# Patient Record
Sex: FEMALE | Race: OTHER | Employment: OTHER | URBAN - NONMETROPOLITAN AREA
[De-identification: names, ages, dates, MRNs, and addresses within clinical notes are randomized per-mention and may not be internally consistent; named-entity substitution may affect disease eponyms.]

---

## 2017-01-25 ENCOUNTER — TELEPHONE (OUTPATIENT)
Dept: CARDIOLOGY | Age: 67
End: 2017-01-25

## 2017-01-25 DIAGNOSIS — Z01.810 PRE-OPERATIVE CARDIOVASCULAR EXAMINATION: ICD-10-CM

## 2017-01-25 DIAGNOSIS — I10 ESSENTIAL HYPERTENSION: Primary | ICD-10-CM

## 2017-01-25 DIAGNOSIS — I20.8 ANGINA AT REST (HCC): ICD-10-CM

## 2017-07-25 ENCOUNTER — HOSPITAL ENCOUNTER (OUTPATIENT)
Dept: INTERVENTIONAL RADIOLOGY/VASCULAR | Age: 67
Discharge: HOME | End: 2017-07-25
Payer: COMMERCIAL

## 2017-07-25 DIAGNOSIS — R09.89 BRUIT: ICD-10-CM

## 2017-07-25 PROCEDURE — 93880 EXTRACRANIAL BILAT STUDY: CPT

## 2017-08-23 ENCOUNTER — OFFICE VISIT (OUTPATIENT)
Dept: CARDIOLOGY CLINIC | Age: 67
End: 2017-08-23
Payer: COMMERCIAL

## 2017-08-23 VITALS
SYSTOLIC BLOOD PRESSURE: 124 MMHG | BODY MASS INDEX: 26.06 KG/M2 | WEIGHT: 138 LBS | HEART RATE: 64 BPM | HEIGHT: 61 IN | DIASTOLIC BLOOD PRESSURE: 72 MMHG

## 2017-08-23 DIAGNOSIS — I10 ESSENTIAL HYPERTENSION: Primary | ICD-10-CM

## 2017-08-23 DIAGNOSIS — R07.2 PRECORDIAL PAIN: ICD-10-CM

## 2017-08-23 PROCEDURE — 99213 OFFICE O/P EST LOW 20 MIN: CPT | Performed by: INTERNAL MEDICINE

## 2018-03-31 ENCOUNTER — HOSPITAL ENCOUNTER (EMERGENCY)
Age: 68
Discharge: HOME | End: 2018-03-31
Payer: COMMERCIAL

## 2018-03-31 VITALS
WEIGHT: 136 LBS | BODY MASS INDEX: 25.7 KG/M2 | HEART RATE: 81 BPM | TEMPERATURE: 98.2 F | RESPIRATION RATE: 16 BRPM | OXYGEN SATURATION: 99 % | DIASTOLIC BLOOD PRESSURE: 88 MMHG | SYSTOLIC BLOOD PRESSURE: 151 MMHG

## 2018-03-31 DIAGNOSIS — J02.0 STREP PHARYNGITIS: Primary | ICD-10-CM

## 2018-03-31 LAB
GROUP A STREP CULTURE, REFLEX: POSITIVE
REFLEX THROAT C + S: NORMAL

## 2018-03-31 PROCEDURE — 99213 OFFICE O/P EST LOW 20 MIN: CPT

## 2018-03-31 PROCEDURE — 99213 OFFICE O/P EST LOW 20 MIN: CPT | Performed by: NURSE PRACTITIONER

## 2018-03-31 RX ORDER — AMOXICILLIN 500 MG/1
500 CAPSULE ORAL 3 TIMES DAILY
Qty: 30 CAPSULE | Refills: 0 | Status: SHIPPED | OUTPATIENT
Start: 2018-03-31 | End: 2018-04-10

## 2018-03-31 ASSESSMENT — ENCOUNTER SYMPTOMS
COUGH: 0
NAUSEA: 0
SORE THROAT: 1
EYE ITCHING: 0
ABDOMINAL PAIN: 0
EYE DISCHARGE: 0
PHOTOPHOBIA: 0
DIARRHEA: 0
SHORTNESS OF BREATH: 0
EYE REDNESS: 0
VOMITING: 0
EYE PAIN: 0
SINUS CONGESTION: 1
CONSTIPATION: 0
SINUS PRESSURE: 1
WHEEZING: 0
RHINORRHEA: 1

## 2018-03-31 ASSESSMENT — PAIN SCALES - GENERAL: PAINLEVEL_OUTOF10: 3

## 2018-03-31 ASSESSMENT — PAIN DESCRIPTION - DESCRIPTORS: DESCRIPTORS: ACHING

## 2018-03-31 ASSESSMENT — PAIN DESCRIPTION - LOCATION: LOCATION: THROAT

## 2018-03-31 ASSESSMENT — PAIN DESCRIPTION - PAIN TYPE: TYPE: ACUTE PAIN

## 2018-03-31 ASSESSMENT — PAIN DESCRIPTION - FREQUENCY: FREQUENCY: CONTINUOUS

## 2018-07-25 ENCOUNTER — HOSPITAL ENCOUNTER (OUTPATIENT)
Age: 68
Discharge: HOME | End: 2018-07-25
Payer: COMMERCIAL

## 2018-07-25 PROCEDURE — 83036 HEMOGLOBIN GLYCOSYLATED A1C: CPT

## 2018-07-25 PROCEDURE — 36415 COLL VENOUS BLD VENIPUNCTURE: CPT

## 2018-07-26 LAB
AVERAGE GLUCOSE: 147 MG/DL (ref 70–126)
HBA1C MFR BLD: 6.9 % (ref 4.4–6.4)

## 2018-08-15 ENCOUNTER — OFFICE VISIT (OUTPATIENT)
Dept: CARDIOLOGY CLINIC | Age: 68
End: 2018-08-15
Payer: COMMERCIAL

## 2018-08-15 VITALS
WEIGHT: 133.69 LBS | BODY MASS INDEX: 26.95 KG/M2 | DIASTOLIC BLOOD PRESSURE: 68 MMHG | SYSTOLIC BLOOD PRESSURE: 144 MMHG | HEIGHT: 59 IN

## 2018-08-15 DIAGNOSIS — E11.9 TYPE 2 DIABETES MELLITUS WITHOUT COMPLICATION, WITHOUT LONG-TERM CURRENT USE OF INSULIN (HCC): ICD-10-CM

## 2018-08-15 DIAGNOSIS — I10 ESSENTIAL HYPERTENSION: Primary | ICD-10-CM

## 2018-08-15 DIAGNOSIS — Z82.49 FAMILY HISTORY OF ASCVD: ICD-10-CM

## 2018-08-15 PROCEDURE — G8598 ASA/ANTIPLAT THER USED: HCPCS | Performed by: PHYSICIAN ASSISTANT

## 2018-08-15 PROCEDURE — 2022F DILAT RTA XM EVC RTNOPTHY: CPT | Performed by: PHYSICIAN ASSISTANT

## 2018-08-15 PROCEDURE — 99213 OFFICE O/P EST LOW 20 MIN: CPT | Performed by: PHYSICIAN ASSISTANT

## 2018-08-15 PROCEDURE — 93000 ELECTROCARDIOGRAM COMPLETE: CPT | Performed by: PHYSICIAN ASSISTANT

## 2018-08-15 PROCEDURE — 1101F PT FALLS ASSESS-DOCD LE1/YR: CPT | Performed by: PHYSICIAN ASSISTANT

## 2018-08-15 PROCEDURE — G8427 DOCREV CUR MEDS BY ELIG CLIN: HCPCS | Performed by: PHYSICIAN ASSISTANT

## 2018-08-15 PROCEDURE — 1123F ACP DISCUSS/DSCN MKR DOCD: CPT | Performed by: PHYSICIAN ASSISTANT

## 2018-08-15 PROCEDURE — 3017F COLORECTAL CA SCREEN DOC REV: CPT | Performed by: PHYSICIAN ASSISTANT

## 2018-08-15 PROCEDURE — 1090F PRES/ABSN URINE INCON ASSESS: CPT | Performed by: PHYSICIAN ASSISTANT

## 2018-08-15 PROCEDURE — G8400 PT W/DXA NO RESULTS DOC: HCPCS | Performed by: PHYSICIAN ASSISTANT

## 2018-08-15 PROCEDURE — 3044F HG A1C LEVEL LT 7.0%: CPT | Performed by: PHYSICIAN ASSISTANT

## 2018-08-15 PROCEDURE — 4040F PNEUMOC VAC/ADMIN/RCVD: CPT | Performed by: PHYSICIAN ASSISTANT

## 2018-08-15 PROCEDURE — 1036F TOBACCO NON-USER: CPT | Performed by: PHYSICIAN ASSISTANT

## 2018-08-15 PROCEDURE — G8419 CALC BMI OUT NRM PARAM NOF/U: HCPCS | Performed by: PHYSICIAN ASSISTANT

## 2018-08-15 RX ORDER — ASPIRIN 81 MG/1
81 TABLET ORAL DAILY
COMMUNITY
End: 2019-08-14 | Stop reason: ALTCHOICE

## 2018-08-15 RX ORDER — CITALOPRAM 20 MG/1
20 TABLET ORAL DAILY
COMMUNITY

## 2018-08-22 ENCOUNTER — HOSPITAL ENCOUNTER (OUTPATIENT)
Dept: WOMENS IMAGING | Age: 68
Discharge: HOME | End: 2018-08-22
Payer: COMMERCIAL

## 2018-08-22 DIAGNOSIS — Z12.31 VISIT FOR SCREENING MAMMOGRAM: ICD-10-CM

## 2018-08-22 PROCEDURE — 77063 BREAST TOMOSYNTHESIS BI: CPT

## 2019-03-05 ENCOUNTER — HOSPITAL ENCOUNTER (OUTPATIENT)
Age: 69
Discharge: HOME | End: 2019-03-05
Payer: COMMERCIAL

## 2019-03-05 ENCOUNTER — HOSPITAL ENCOUNTER (OUTPATIENT)
Dept: GENERAL RADIOLOGY | Age: 69
Discharge: HOME | End: 2019-03-05
Payer: COMMERCIAL

## 2019-03-05 DIAGNOSIS — M25.40 SWOLLEN JOINT: ICD-10-CM

## 2019-03-05 PROCEDURE — 73130 X-RAY EXAM OF HAND: CPT

## 2019-03-22 ENCOUNTER — HOSPITAL ENCOUNTER (OUTPATIENT)
Age: 69
Discharge: HOME | End: 2019-03-22
Payer: COMMERCIAL

## 2019-03-22 LAB
ALBUMIN SERPL-MCNC: 4 G/DL (ref 3.5–5.1)
ALP BLD-CCNC: 74 U/L (ref 38–126)
ALT SERPL-CCNC: 11 U/L (ref 11–66)
ANION GAP SERPL CALCULATED.3IONS-SCNC: 12 MEQ/L (ref 8–16)
AST SERPL-CCNC: 17 U/L (ref 5–40)
BILIRUB SERPL-MCNC: 0.2 MG/DL (ref 0.3–1.2)
BUN BLDV-MCNC: 20 MG/DL (ref 7–22)
CALCIUM SERPL-MCNC: 9.7 MG/DL (ref 8.5–10.5)
CHLORIDE BLD-SCNC: 101 MEQ/L (ref 98–111)
CHOLESTEROL, FASTING: 131 MG/DL (ref 100–199)
CO2: 26 MEQ/L (ref 23–33)
CREAT SERPL-MCNC: 0.7 MG/DL (ref 0.4–1.2)
GFR SERPL CREATININE-BSD FRML MDRD: 84 ML/MIN/1.73M2
GLUCOSE FASTING: 116 MG/DL (ref 70–108)
HDLC SERPL-MCNC: 47 MG/DL
LDL CHOLESTEROL CALCULATED: 62 MG/DL
POTASSIUM SERPL-SCNC: 4.9 MEQ/L (ref 3.5–5.2)
RHEUMATOID FACTOR: 12 IU/ML (ref 0–13)
SEDIMENTATION RATE, ERYTHROCYTE: 10 MM/HR (ref 0–20)
SODIUM BLD-SCNC: 139 MEQ/L (ref 135–145)
TOTAL PROTEIN: 7.1 G/DL (ref 6.1–8)
TRIGLYCERIDE, FASTING: 108 MG/DL (ref 0–199)

## 2019-03-22 PROCEDURE — 85651 RBC SED RATE NONAUTOMATED: CPT

## 2019-03-22 PROCEDURE — 36415 COLL VENOUS BLD VENIPUNCTURE: CPT

## 2019-03-22 PROCEDURE — 80053 COMPREHEN METABOLIC PANEL: CPT

## 2019-03-22 PROCEDURE — 86431 RHEUMATOID FACTOR QUANT: CPT

## 2019-03-22 PROCEDURE — 80061 LIPID PANEL: CPT

## 2019-08-14 ENCOUNTER — OFFICE VISIT (OUTPATIENT)
Dept: CARDIOLOGY CLINIC | Age: 69
End: 2019-08-14
Payer: COMMERCIAL

## 2019-08-14 VITALS
BODY MASS INDEX: 26.85 KG/M2 | DIASTOLIC BLOOD PRESSURE: 58 MMHG | SYSTOLIC BLOOD PRESSURE: 102 MMHG | HEIGHT: 59 IN | WEIGHT: 133.2 LBS | HEART RATE: 85 BPM

## 2019-08-14 DIAGNOSIS — Z82.49 FAMILY HISTORY OF ASCVD: Primary | ICD-10-CM

## 2019-08-14 PROBLEM — M75.51 BURSITIS OF RIGHT SHOULDER: Status: ACTIVE | Noted: 2019-08-14

## 2019-08-14 PROBLEM — G56.01 CARPAL TUNNEL SYNDROME OF RIGHT WRIST: Status: ACTIVE | Noted: 2019-08-14

## 2019-08-14 PROBLEM — R20.0 LOSS OF FEELING OR SENSATION: Status: ACTIVE | Noted: 2019-08-14

## 2019-08-14 PROBLEM — M19.019 LOCALIZED, PRIMARY OSTEOARTHRITIS OF SHOULDER REGION: Status: ACTIVE | Noted: 2019-08-14

## 2019-08-14 PROBLEM — M75.122 COMPLETE TEAR OF LEFT ROTATOR CUFF: Status: ACTIVE | Noted: 2019-08-14

## 2019-08-14 PROBLEM — M75.40 IMPINGEMENT SYNDROME OF SHOULDER: Status: ACTIVE | Noted: 2019-08-14

## 2019-08-14 PROCEDURE — 3017F COLORECTAL CA SCREEN DOC REV: CPT | Performed by: INTERNAL MEDICINE

## 2019-08-14 PROCEDURE — 1123F ACP DISCUSS/DSCN MKR DOCD: CPT | Performed by: INTERNAL MEDICINE

## 2019-08-14 PROCEDURE — 99213 OFFICE O/P EST LOW 20 MIN: CPT | Performed by: INTERNAL MEDICINE

## 2019-08-14 PROCEDURE — G8419 CALC BMI OUT NRM PARAM NOF/U: HCPCS | Performed by: INTERNAL MEDICINE

## 2019-08-14 PROCEDURE — 1036F TOBACCO NON-USER: CPT | Performed by: INTERNAL MEDICINE

## 2019-08-14 PROCEDURE — G8598 ASA/ANTIPLAT THER USED: HCPCS | Performed by: INTERNAL MEDICINE

## 2019-08-14 PROCEDURE — 4040F PNEUMOC VAC/ADMIN/RCVD: CPT | Performed by: INTERNAL MEDICINE

## 2019-08-14 PROCEDURE — 1090F PRES/ABSN URINE INCON ASSESS: CPT | Performed by: INTERNAL MEDICINE

## 2019-08-14 PROCEDURE — G8427 DOCREV CUR MEDS BY ELIG CLIN: HCPCS | Performed by: INTERNAL MEDICINE

## 2019-08-14 PROCEDURE — 93000 ELECTROCARDIOGRAM COMPLETE: CPT | Performed by: INTERNAL MEDICINE

## 2019-08-14 PROCEDURE — G8400 PT W/DXA NO RESULTS DOC: HCPCS | Performed by: INTERNAL MEDICINE

## 2019-08-14 RX ORDER — ATORVASTATIN CALCIUM 20 MG/1
20 TABLET, FILM COATED ORAL DAILY
Refills: 11 | COMMUNITY

## 2019-08-14 RX ORDER — DULAGLUTIDE 1.5 MG/.5ML
1.5 INJECTION, SOLUTION SUBCUTANEOUS WEEKLY
Refills: 6 | COMMUNITY

## 2019-08-14 NOTE — PROGRESS NOTES
COLONOSCOPY      ENDOSCOPY, COLON, DIAGNOSTIC      FOOT SURGERY Bilateral 2004    HYSTERECTOMY      JOINT REPLACEMENT Right 2009 2018    knee    LITHOTRIPSY      MECKEL DIVERTICULUM EXCISION      diverticulosis    SHOULDER SURGERY Left 1992       Subjective:     REVIEW OF SYSTEMS  Constitutional: denies sweats, chills and fever  HENT: denies  congestion, sinus pressure, sneezing and sore throat. Eyes: denies  pain, discharge, redness and itching. Respiratory: denies apnea, cough  Gastrointestinal: denies blood in stool, constipation, diarrhea   Endocrine: denies cold intolerance, heat intolerance, polydipsia. Genitourinary: denies dysuria, enuresis, flank pain and hematuria. Musculoskeletal: denies arthralgias, joint swelling and neck pain. Neurological: denies numbness and headaches. Psychiatric/Behavioral: denies agitation, confusion, decreased concentration and dysphoric mood    All others reviewed and are negative. Objective:     BP (!) 102/58   Pulse 85   Ht 4' 11\" (1.499 m)   Wt 133 lb 3.2 oz (60.4 kg)   BMI 26.90 kg/m²     Wt Readings from Last 3 Encounters:   08/14/19 133 lb 3.2 oz (60.4 kg)   08/15/18 133 lb 11 oz (60.6 kg)   03/31/18 136 lb (61.7 kg)     BP Readings from Last 3 Encounters:   08/14/19 (!) 102/58   08/15/18 (!) 144/68   03/31/18 (!) 151/88       PHYSICAL EXAM  Constitutional: Oriented to person, place, and time. Appears well-developed and well-nourished. HENT:   Head: Normocephalic and atraumatic. Eyes: EOM are normal. Pupils are equal, round, and reactive to light. Neck: Normal range of motion. Neck supple. No JVD present. Cardiovascular: Normal rate , normal heart sounds and intact distal pulses. Pulmonary/Chest: Effort normal and breath sounds normal. No respiratory distress. No wheezes. No rales. Abdominal: Soft. Bowel sounds are normal. No distension. There is no tenderness. Musculoskeletal: Normal range of motion. No edema.    Neurological: Alert

## 2019-09-03 ENCOUNTER — NURSE ONLY (OUTPATIENT)
Dept: LAB | Age: 69
End: 2019-09-03

## 2019-09-03 LAB
ALBUMIN SERPL-MCNC: 4.7 G/DL (ref 3.5–5.1)
ALP BLD-CCNC: 74 U/L (ref 38–126)
ALT SERPL-CCNC: 13 U/L (ref 11–66)
ANION GAP SERPL CALCULATED.3IONS-SCNC: 12 MEQ/L (ref 8–16)
AST SERPL-CCNC: 19 U/L (ref 5–40)
BACTERIA: ABNORMAL
BASOPHILS # BLD: 0.4 %
BASOPHILS ABSOLUTE: 0 THOU/MM3 (ref 0–0.1)
BILIRUB SERPL-MCNC: 0.4 MG/DL (ref 0.3–1.2)
BILIRUBIN URINE: NEGATIVE
BLOOD, URINE: NEGATIVE
BUN BLDV-MCNC: 14 MG/DL (ref 7–22)
CALCIUM SERPL-MCNC: 10.5 MG/DL (ref 8.5–10.5)
CASTS: ABNORMAL /LPF
CASTS: ABNORMAL /LPF
CHARACTER, URINE: CLEAR
CHLORIDE BLD-SCNC: 101 MEQ/L (ref 98–111)
CHOLESTEROL, TOTAL: 144 MG/DL (ref 100–199)
CO2: 27 MEQ/L (ref 23–33)
COLOR: YELLOW
CREAT SERPL-MCNC: 0.6 MG/DL (ref 0.4–1.2)
CRYSTALS: ABNORMAL
EOSINOPHIL # BLD: 1.3 %
EOSINOPHILS ABSOLUTE: 0.1 THOU/MM3 (ref 0–0.4)
EPITHELIAL CELLS, UA: ABNORMAL /HPF
ERYTHROCYTE [DISTWIDTH] IN BLOOD BY AUTOMATED COUNT: 12.3 % (ref 11.5–14.5)
ERYTHROCYTE [DISTWIDTH] IN BLOOD BY AUTOMATED COUNT: 44.7 FL (ref 35–45)
GFR SERPL CREATININE-BSD FRML MDRD: > 90 ML/MIN/1.73M2
GLUCOSE BLD-MCNC: 103 MG/DL (ref 70–108)
GLUCOSE, URINE: NEGATIVE MG/DL
HCT VFR BLD CALC: 40.1 % (ref 37–47)
HDLC SERPL-MCNC: 55 MG/DL
HEMOGLOBIN: 12.8 GM/DL (ref 12–16)
IMMATURE GRANS (ABS): 0.01 THOU/MM3 (ref 0–0.07)
IMMATURE GRANULOCYTES: 0 %
KETONES, URINE: NEGATIVE
LDL CHOLESTEROL CALCULATED: 60 MG/DL
LEUKOCYTE EST, POC: ABNORMAL
LYMPHOCYTES # BLD: 31.6 %
LYMPHOCYTES ABSOLUTE: 1.7 THOU/MM3 (ref 1–4.8)
MCH RBC QN AUTO: 31.3 PG (ref 26–33)
MCHC RBC AUTO-ENTMCNC: 31.9 GM/DL (ref 32.2–35.5)
MCV RBC AUTO: 98 FL (ref 81–99)
MISCELLANEOUS LAB TEST RESULT: ABNORMAL
MONOCYTES # BLD: 5.7 %
MONOCYTES ABSOLUTE: 0.3 THOU/MM3 (ref 0.4–1.3)
NITRITE, URINE: NEGATIVE
NUCLEATED RED BLOOD CELLS: 0 /100 WBC
PH UA: 7 (ref 5–9)
PLATELET # BLD: 282 THOU/MM3 (ref 130–400)
PMV BLD AUTO: 10.5 FL (ref 9.4–12.4)
POTASSIUM SERPL-SCNC: 4.7 MEQ/L (ref 3.5–5.2)
PROTEIN UA: NEGATIVE MG/DL
RBC # BLD: 4.09 MILL/MM3 (ref 4.2–5.4)
RBC URINE: ABNORMAL /HPF
RENAL EPITHELIAL, UA: ABNORMAL
SEG NEUTROPHILS: 60.8 %
SEGMENTED NEUTROPHILS ABSOLUTE COUNT: 3.3 THOU/MM3 (ref 1.8–7.7)
SODIUM BLD-SCNC: 140 MEQ/L (ref 135–145)
SPECIFIC GRAVITY UA: 1.02 (ref 1–1.03)
T4 FREE: 1.24 NG/DL (ref 0.93–1.76)
TOTAL PROTEIN: 8 G/DL (ref 6.1–8)
TRIGL SERPL-MCNC: 143 MG/DL (ref 0–199)
TSH SERPL DL<=0.05 MIU/L-ACNC: 3.99 UIU/ML (ref 0.4–4.2)
UROBILINOGEN, URINE: 0.2 EU/DL (ref 0–1)
WBC # BLD: 5.5 THOU/MM3 (ref 4.8–10.8)
WBC UA: ABNORMAL /HPF
YEAST: ABNORMAL

## 2020-03-02 ENCOUNTER — APPOINTMENT (OUTPATIENT)
Dept: CT IMAGING | Age: 70
End: 2020-03-02
Payer: MEDICARE

## 2020-03-02 ENCOUNTER — HOSPITAL ENCOUNTER (EMERGENCY)
Age: 70
Discharge: HOME | End: 2020-03-02
Attending: FAMILY MEDICINE
Payer: MEDICARE

## 2020-03-02 ENCOUNTER — APPOINTMENT (OUTPATIENT)
Dept: GENERAL RADIOLOGY | Age: 70
End: 2020-03-02
Payer: MEDICARE

## 2020-03-02 VITALS
WEIGHT: 130 LBS | RESPIRATION RATE: 17 BRPM | TEMPERATURE: 97.7 F | SYSTOLIC BLOOD PRESSURE: 107 MMHG | BODY MASS INDEX: 26.21 KG/M2 | OXYGEN SATURATION: 100 % | HEART RATE: 77 BPM | HEIGHT: 59 IN | DIASTOLIC BLOOD PRESSURE: 48 MMHG

## 2020-03-02 LAB
ANION GAP SERPL CALCULATED.3IONS-SCNC: 11 MEQ/L (ref 8–16)
BASOPHILS # BLD: 0.2 %
BASOPHILS ABSOLUTE: 0 THOU/MM3 (ref 0–0.1)
BUN BLDV-MCNC: 20 MG/DL (ref 7–22)
CALCIUM SERPL-MCNC: 9.4 MG/DL (ref 8.5–10.5)
CHLORIDE BLD-SCNC: 100 MEQ/L (ref 98–111)
CO2: 23 MEQ/L (ref 23–33)
CREAT SERPL-MCNC: 0.7 MG/DL (ref 0.4–1.2)
EKG ATRIAL RATE: 87 BPM
EKG P AXIS: 57 DEGREES
EKG P-R INTERVAL: 150 MS
EKG Q-T INTERVAL: 360 MS
EKG QRS DURATION: 78 MS
EKG QTC CALCULATION (BAZETT): 433 MS
EKG R AXIS: -25 DEGREES
EKG T AXIS: 30 DEGREES
EKG VENTRICULAR RATE: 87 BPM
EOSINOPHIL # BLD: 2.1 %
EOSINOPHILS ABSOLUTE: 0.2 THOU/MM3 (ref 0–0.4)
ERYTHROCYTE [DISTWIDTH] IN BLOOD BY AUTOMATED COUNT: 12.9 % (ref 11.5–14.5)
ERYTHROCYTE [DISTWIDTH] IN BLOOD BY AUTOMATED COUNT: 46.5 FL (ref 35–45)
GFR SERPL CREATININE-BSD FRML MDRD: 83 ML/MIN/1.73M2
GLUCOSE BLD-MCNC: 97 MG/DL (ref 70–108)
HCT VFR BLD CALC: 38.4 % (ref 37–47)
HEMOGLOBIN: 11.9 GM/DL (ref 12–16)
IMMATURE GRANS (ABS): 0.01 THOU/MM3 (ref 0–0.07)
IMMATURE GRANULOCYTES: 0.1 %
LYMPHOCYTES # BLD: 24.6 %
LYMPHOCYTES ABSOLUTE: 2 THOU/MM3 (ref 1–4.8)
MCH RBC QN AUTO: 30.7 PG (ref 26–33)
MCHC RBC AUTO-ENTMCNC: 31 GM/DL (ref 32.2–35.5)
MCV RBC AUTO: 99.2 FL (ref 81–99)
MONOCYTES # BLD: 4.3 %
MONOCYTES ABSOLUTE: 0.3 THOU/MM3 (ref 0.4–1.3)
NUCLEATED RED BLOOD CELLS: 0 /100 WBC
OSMOLALITY CALCULATION: 270.8 MOSMOL/KG (ref 275–300)
PLATELET # BLD: 256 THOU/MM3 (ref 130–400)
PLATELET ESTIMATE: ADEQUATE
PMV BLD AUTO: 10.5 FL (ref 9.4–12.4)
POTASSIUM REFLEX MAGNESIUM: 4.7 MEQ/L (ref 3.5–5.2)
RBC # BLD: 3.87 MILL/MM3 (ref 4.2–5.4)
SCAN OF BLOOD SMEAR: NORMAL
SEG NEUTROPHILS: 68.7 %
SEGMENTED NEUTROPHILS ABSOLUTE COUNT: 5.6 THOU/MM3 (ref 1.8–7.7)
SODIUM BLD-SCNC: 134 MEQ/L (ref 135–145)
WBC # BLD: 8.1 THOU/MM3 (ref 4.8–10.8)

## 2020-03-02 PROCEDURE — 93005 ELECTROCARDIOGRAM TRACING: CPT | Performed by: FAMILY MEDICINE

## 2020-03-02 PROCEDURE — 72125 CT NECK SPINE W/O DYE: CPT

## 2020-03-02 PROCEDURE — 6370000000 HC RX 637 (ALT 250 FOR IP): Performed by: FAMILY MEDICINE

## 2020-03-02 PROCEDURE — 70450 CT HEAD/BRAIN W/O DYE: CPT

## 2020-03-02 PROCEDURE — 36415 COLL VENOUS BLD VENIPUNCTURE: CPT

## 2020-03-02 PROCEDURE — 99284 EMERGENCY DEPT VISIT MOD MDM: CPT

## 2020-03-02 PROCEDURE — 80048 BASIC METABOLIC PNL TOTAL CA: CPT

## 2020-03-02 PROCEDURE — 73564 X-RAY EXAM KNEE 4 OR MORE: CPT

## 2020-03-02 PROCEDURE — 93010 ELECTROCARDIOGRAM REPORT: CPT | Performed by: NUCLEAR MEDICINE

## 2020-03-02 PROCEDURE — 85025 COMPLETE CBC W/AUTO DIFF WBC: CPT

## 2020-03-02 RX ORDER — ONDANSETRON 4 MG/1
4 TABLET, ORALLY DISINTEGRATING ORAL ONCE
Status: COMPLETED | OUTPATIENT
Start: 2020-03-02 | End: 2020-03-02

## 2020-03-02 RX ADMIN — ONDANSETRON 4 MG: 4 TABLET, ORALLY DISINTEGRATING ORAL at 10:37

## 2020-03-02 ASSESSMENT — PAIN DESCRIPTION - LOCATION: LOCATION: NECK;HEAD

## 2020-03-02 ASSESSMENT — PAIN DESCRIPTION - PAIN TYPE: TYPE: ACUTE PAIN

## 2020-03-02 ASSESSMENT — PAIN DESCRIPTION - ORIENTATION: ORIENTATION: OTHER (COMMENT)

## 2020-03-02 ASSESSMENT — PAIN SCALES - GENERAL: PAINLEVEL_OUTOF10: 3

## 2020-03-02 ASSESSMENT — PAIN DESCRIPTION - DESCRIPTORS: DESCRIPTORS: ACHING;HEADACHE

## 2020-03-02 NOTE — ED TRIAGE NOTES
Pt presents to rm 21 c/o lightheadedness, head and neck pain and vomiting that started yesterday after pt slipped and fell on the bottom step of her staircase. Pt states that she doesn't remember hitting her head but passed out yesterday and has been vomiting and these symptoms since then. Pt updated on POC. VSS. EKG completed and pt placed on cardiac monitor. Call light in reach. Will monitor.

## 2020-03-02 NOTE — ED PROVIDER NOTES
[address] [facility] COMPLAINT   Chief Complaint   Patient presents with    Headache    Loss of Consciousness     yesterday    Emesis        HPI   Shubham Smith is a 81 y.o. female well controlled diabetic on trulicity and metformin who presents after syncope yesterday after a misstep from walking down to her basement to do laundry, but she tripped over a step and landed on her left knee. She was able to get up but in doing so passed out, and found herself inside her laundry basket. She woke up with headache and neck pain this morning. She is a dialysis nurse. Moving her neck hurt her right side of neck. She denies any vision loss or unilateral strength deficits. REVIEW OF SYSTEMS   Cardiac: +syncope; No Chest Pain, denies palpitations  Neurologic: + Headache; denies vision change or extremity weakness  Neck: +neck pain  Respiratory: No SOB  GI: No abdominal pain or vomiting   General: Denies Fever  All other review of systems otherwise negative.      PAST MEDICAL & SURGICAL HISTORY   Past Medical History:   Diagnosis Date    Anxiety     Blood transfusion     Diabetes mellitus ([facility] [address]     Diverticulosis of colon     Hyperlipidemia     Hypertension     Localized, primary osteoarthritis of shoulder region 8/14/2019    Nausea & vomiting      Past Surgical History:   Procedure Laterality Date    CARPAL TUNNEL RELEASE Right     CHOLECYSTECTOMY      COLONOSCOPY      ENDOSCOPY, COLON, DIAGNOSTIC      FOOT SURGERY Bilateral 2004    HYSTERECTOMY      JOINT REPLACEMENT Right 2009 2018    knee    LITHOTRIPSY      MECKEL DIVERTICULUM EXCISION      diverticulosis    SHOULDER SURGERY Left 1992      CURRENT MEDICATIONS   Current Outpatient Rx   Medication Sig Dispense Refill    atorvastatin (LIPITOR) 20 MG tablet TAKE 1 TABLET BY MOUTH ONCE DAILY FOR 30 DAYS  11    TRULICITY 2.12 EP/2.0CU SOPN INJECT AS DIRECTED ONCE WEEKLY SUBCUTANEOUSLY FOR 30 DAYS  6    citalopram (CELEXA) 40 acute distress   HENT: Atraumatic, moist mucus membranes, pupils equally round and reactive to light, extraocular movements intact; no scalp tenderness or contusion noted to head  Neck: supple, no JVD   Respiratory: Lungs Clear, no retractions   Cardiovascular: Reg rate, normal rhythm, no murmurs  Vascular: Radial pulses 2+ equal bilaterally  GI: Soft, nontender, normal bowel sounds  Musculoskeletal: No edema, no deformities  Integument: Skin warm and dry, no petechiae   Neurologic: Alert & oriented, moving all extremities without issues, no pronator drift. No facial droop, speech is clear  Psych: Pleasant affect, no hallucinations     EKG (interpreted by me) Interpretation 0929 on 3/2/2020  Rhythm: normal sinus   Rate: 87 BPM  Axis: normal  Ectopy: none  Conduction: normal  ST/T Segments: no acute change  Clinical Impression: nonspecific    RADIOLOGY/PROCEDURES   CT Head WO Contrast   Final Result   No acute process            **This report has been created using voice recognition software. It may contain minor errors which are inherent in voice recognition technology. **      Final report electronically signed by Dr. Kush Wright on 3/2/2020 10:24 AM      CT Cervical Spine WO Contrast   Final Result   Straightening of the normal cervical lordosis which could be related to muscle spasm and/or positioning. No acute fracture. **This report has been created using voice recognition software. It may contain minor errors which are inherent in voice recognition technology. **      Final report electronically signed by Dr. Kush Wright on 3/2/2020 10:27 AM      XR KNEE LEFT (MIN 4 VIEWS)   Final Result   Soft tissue contusion no acute osseous abnormality            **This report has been created using voice recognition software. It may contain minor errors which are inherent in voice recognition technology. **      Final report electronically signed by Dr. Kush Wright on 3/2/2020 10:28 AM          ED COURSE &

## 2020-03-02 NOTE — ED NOTES
Lab at bedside. Pt medicated per order. VSS. Denies other needs at this time. Call light in reach. Will monitor.       Ronak Regalado RN  03/02/20 0533

## 2020-05-07 ENCOUNTER — HOSPITAL ENCOUNTER (OUTPATIENT)
Age: 70
Discharge: HOME | End: 2020-05-07
Payer: MEDICARE

## 2020-05-07 LAB
ALBUMIN SERPL-MCNC: 4.5 G/DL (ref 3.5–5.1)
ALP BLD-CCNC: 68 U/L (ref 38–126)
ALT SERPL-CCNC: 16 U/L (ref 11–66)
ANION GAP SERPL CALCULATED.3IONS-SCNC: 11 MEQ/L (ref 8–16)
AST SERPL-CCNC: 22 U/L (ref 5–40)
BASOPHILS # BLD: 0.2 %
BASOPHILS ABSOLUTE: 0 THOU/MM3 (ref 0–0.1)
BILIRUB SERPL-MCNC: 0.4 MG/DL (ref 0.3–1.2)
BUN BLDV-MCNC: 15 MG/DL (ref 7–22)
C-REACTIVE PROTEIN: 0.1 MG/DL (ref 0–1)
CALCIUM SERPL-MCNC: 9.9 MG/DL (ref 8.5–10.5)
CHLORIDE BLD-SCNC: 99 MEQ/L (ref 98–111)
CO2: 24 MEQ/L (ref 23–33)
CREAT SERPL-MCNC: 0.7 MG/DL (ref 0.4–1.2)
EOSINOPHIL # BLD: 1.1 %
EOSINOPHILS ABSOLUTE: 0.1 THOU/MM3 (ref 0–0.4)
ERYTHROCYTE [DISTWIDTH] IN BLOOD BY AUTOMATED COUNT: 13.4 % (ref 11.5–14.5)
ERYTHROCYTE [DISTWIDTH] IN BLOOD BY AUTOMATED COUNT: 49.1 FL (ref 35–45)
GFR SERPL CREATININE-BSD FRML MDRD: 83 ML/MIN/1.73M2
GLUCOSE BLD-MCNC: 97 MG/DL (ref 70–108)
HCT VFR BLD CALC: 38.8 % (ref 37–47)
HEMOGLOBIN: 12 GM/DL (ref 12–16)
IMMATURE GRANS (ABS): 0.01 THOU/MM3 (ref 0–0.07)
IMMATURE GRANULOCYTES: 0.2 %
LYMPHOCYTES # BLD: 29.4 %
LYMPHOCYTES ABSOLUTE: 1.6 THOU/MM3 (ref 1–4.8)
MCH RBC QN AUTO: 30.5 PG (ref 26–33)
MCHC RBC AUTO-ENTMCNC: 30.9 GM/DL (ref 32.2–35.5)
MCV RBC AUTO: 98.7 FL (ref 81–99)
MONOCYTES # BLD: 5.6 %
MONOCYTES ABSOLUTE: 0.3 THOU/MM3 (ref 0.4–1.3)
NUCLEATED RED BLOOD CELLS: 0 /100 WBC
PLATELET # BLD: 273 THOU/MM3 (ref 130–400)
PMV BLD AUTO: 10.8 FL (ref 9.4–12.4)
POTASSIUM SERPL-SCNC: 4.8 MEQ/L (ref 3.5–5.2)
RBC # BLD: 3.93 MILL/MM3 (ref 4.2–5.4)
RHEUMATOID FACTOR: 11 IU/ML (ref 0–13)
SEDIMENTATION RATE, ERYTHROCYTE: 14 MM/HR (ref 0–20)
SEG NEUTROPHILS: 63.5 %
SEGMENTED NEUTROPHILS ABSOLUTE COUNT: 3.5 THOU/MM3 (ref 1.8–7.7)
SODIUM BLD-SCNC: 134 MEQ/L (ref 135–145)
TOTAL PROTEIN: 7.2 G/DL (ref 6.1–8)
URIC ACID: 5.6 MG/DL (ref 2.4–5.7)
WBC # BLD: 5.5 THOU/MM3 (ref 4.8–10.8)

## 2020-05-07 PROCEDURE — 86038 ANTINUCLEAR ANTIBODIES: CPT

## 2020-05-07 PROCEDURE — 86430 RHEUMATOID FACTOR TEST QUAL: CPT

## 2020-05-07 PROCEDURE — 85651 RBC SED RATE NONAUTOMATED: CPT

## 2020-05-07 PROCEDURE — 80053 COMPREHEN METABOLIC PANEL: CPT

## 2020-05-07 PROCEDURE — 85025 COMPLETE CBC W/AUTO DIFF WBC: CPT

## 2020-05-07 PROCEDURE — 86039 ANTINUCLEAR ANTIBODIES (ANA): CPT

## 2020-05-07 PROCEDURE — 86140 C-REACTIVE PROTEIN: CPT

## 2020-05-07 PROCEDURE — 36415 COLL VENOUS BLD VENIPUNCTURE: CPT

## 2020-05-07 PROCEDURE — 84550 ASSAY OF BLOOD/URIC ACID: CPT

## 2020-05-07 PROCEDURE — 86200 CCP ANTIBODY: CPT

## 2020-05-10 LAB — ANA SCREEN: DETECTED

## 2020-05-11 LAB
ANA PATTERN: ABNORMAL
ANA TITER: ABNORMAL
ANTINUCLEAR AB INTERPRETIVE COMMENT: ABNORMAL
ANTINUCLEAR ANTIBODY, HEP-2, IGG: DETECTED
CYCLIC CITRULLINATED PEPTIDE ANTIBODY IGG: < 1.5 U/ML

## 2020-06-12 ENCOUNTER — HOSPITAL ENCOUNTER (OUTPATIENT)
Dept: ULTRASOUND IMAGING | Age: 70
Discharge: HOME | End: 2020-06-12
Payer: MEDICARE

## 2020-06-12 PROCEDURE — 76705 ECHO EXAM OF ABDOMEN: CPT

## 2020-08-10 ENCOUNTER — HOSPITAL ENCOUNTER (OUTPATIENT)
Age: 70
End: 2020-08-10
Payer: MEDICARE

## 2020-08-26 ENCOUNTER — OFFICE VISIT (OUTPATIENT)
Dept: CARDIOLOGY CLINIC | Age: 70
End: 2020-08-26
Payer: MEDICARE

## 2020-08-26 VITALS
HEART RATE: 80 BPM | SYSTOLIC BLOOD PRESSURE: 122 MMHG | BODY MASS INDEX: 26 KG/M2 | DIASTOLIC BLOOD PRESSURE: 64 MMHG | HEIGHT: 59 IN | WEIGHT: 129 LBS

## 2020-08-26 PROCEDURE — 99213 OFFICE O/P EST LOW 20 MIN: CPT | Performed by: INTERNAL MEDICINE

## 2020-08-26 RX ORDER — HYDROXYCHLOROQUINE SULFATE 200 MG/1
300 TABLET, FILM COATED ORAL DAILY
COMMUNITY
Start: 2020-08-06

## 2020-08-26 RX ORDER — TOLTERODINE TARTRATE 1 MG/1
1 TABLET, EXTENDED RELEASE ORAL 2 TIMES DAILY
COMMUNITY

## 2020-08-26 RX ORDER — DICYCLOMINE HYDROCHLORIDE 10 MG/1
CAPSULE ORAL
COMMUNITY
Start: 2020-07-07 | End: 2021-12-06

## 2020-08-26 NOTE — PROGRESS NOTES
[address] [place] [address] [address] [facility] [address]  Dept: [phone]  Dept Fax: [phone]  Loc: [phone]    Visit Date: 8/26/2020    Ms. Giancarlo Hernandez is a 81 y.o. female  who presented for:  Chief Complaint   Patient presents with    Check-Up    Hypertension       HPI:   82 yo F c hx of DM, HTN, is here for afollow up. Denies any chest pain, sob, palpitations, lightheadedness, dizziness, orthopnea, PND or pedal edema. Current Outpatient Medications:     hydroxychloroquine (PLAQUENIL) 200 MG tablet, 200 mg , Disp: , Rfl:     tolterodine (DETROL) 1 MG tablet, TAKE 1 TABLET BY MOUTH TWICE DAILY FOR 30 DAYS, Disp: , Rfl:     Probiotic Product (PROBIOTIC DAILY PO), Take by mouth 2 times daily, Disp: , Rfl:     dicyclomine (BENTYL) 10 MG capsule, TAKE 1 CAPSULE BY MOUTH 4 TIMES DAILY AS NEEDED FOR CRAMPING PAIN, Disp: , Rfl:     atorvastatin (LIPITOR) 20 MG tablet, TAKE 1 TABLET BY MOUTH ONCE DAILY FOR 30 DAYS, Disp: , Rfl: 11    TRULICITY 7.80 LC/3.3KG SOPN, INJECT AS DIRECTED ONCE WEEKLY SUBCUTANEOUSLY FOR 30 DAYS, Disp: , Rfl: 6    citalopram (CELEXA) 40 MG tablet, Take 20 mg by mouth daily , Disp: , Rfl:     Multiple Vitamins-Minerals (THERAPEUTIC MULTIVITAMIN-MINERALS) tablet, Take 1 tablet by mouth daily, Disp: , Rfl:     lisinopril (PRINIVIL;ZESTRIL) 5 MG tablet, Take 5 mg by mouth daily. , Disp: , Rfl:     metformin (GLUCOPHAGE) 1000 MG tablet, Take 1,000 mg by mouth 2 times daily (with meals). , Disp: , Rfl:     Past Medical History  Abdullahi Oviedo  has a past medical history of Anxiety, Blood transfusion, Diabetes mellitus ([address] Diverticulosis of colon, Hyperlipidemia, Hypertension, Localized, primary osteoarthritis of shoulder region, and Nausea & vomiting. Social History  Abdullahi Oviedo  reports that she has never smoked. She has never used smokeless tobacco. She reports current alcohol use.  She reports that she does not use drugs. Family History  Ayala Hernandez family history includes Arthritis in her father and mother; Cancer in her father; Heart Disease in her father; Stroke in her sister. Past Surgical History   Past Surgical History:   Procedure Laterality Date    CARPAL TUNNEL RELEASE Right     CHOLECYSTECTOMY      COLONOSCOPY      ENDOSCOPY, COLON, DIAGNOSTIC      FOOT SURGERY Bilateral 2004    HYSTERECTOMY      JOINT REPLACEMENT Right 2009 2018    knee    LITHOTRIPSY      MECKEL DIVERTICULUM EXCISION      diverticulosis    SHOULDER SURGERY Left 1992       Subjective:     REVIEW OF SYSTEMS  Constitutional: denies sweats, chills and fever  HENT: denies  congestion, sinus pressure, sneezing and sore throat. Eyes: denies  pain, discharge, redness and itching. Respiratory: denies apnea, cough  Gastrointestinal: denies blood in stool, constipation, diarrhea   Endocrine: denies cold intolerance, heat intolerance, polydipsia. Genitourinary: denies dysuria, enuresis, flank pain and hematuria. Musculoskeletal: denies arthralgias, joint swelling and neck pain. Neurological: denies numbness and headaches. Psychiatric/Behavioral: denies agitation, confusion, decreased concentration and dysphoric mood    All others reviewed and are negative. Objective:     /64   Pulse 80   Ht 4' 11\" (1.499 m)   Wt 129 lb (58.5 kg)   BMI 26.05 kg/m²     Wt Readings from Last 3 Encounters:   08/26/20 129 lb (58.5 kg)   03/02/20 130 lb (59 kg)   08/14/19 133 lb 3.2 oz (60.4 kg)     BP Readings from Last 3 Encounters:   08/26/20 122/64   03/02/20 (!) 107/48   08/14/19 (!) 102/58       PHYSICAL EXAM  Constitutional: Oriented to person, place, and time. Appears well-developed and well-nourished. HENT:   Head: Normocephalic and atraumatic. Eyes: EOM are normal. Pupils are equal, round, and reactive to light. Neck: Normal range of motion. Neck supple. No JVD present.    Cardiovascular: Normal rate , normal heart sounds and intact distal pulses. Pulmonary/Chest: Effort normal and breath sounds normal. No respiratory distress. No wheezes. No rales. Abdominal: Soft. Bowel sounds are normal. No distension. There is no tenderness. Musculoskeletal: Normal range of motion. No edema. Neurological: Alert and oriented to person, place, and time. No cranial nerve deficit. Coordination normal.   Skin: Skin is warm and dry. Psychiatric: Normal mood and affect. No results found for: CKTOTAL, CKMB, CKMBINDEX    Lab Results   Component Value Date    WBC 4.2 08/11/2020    RBC 3.92 08/11/2020    HGB 12.0 08/11/2020    HCT 37.8 08/11/2020    MCV 96.4 08/11/2020    MCH 30.6 08/11/2020    MCHC 31.7 08/11/2020    RDW 14.4 01/24/2017     08/11/2020    MPV 10.7 08/11/2020       Lab Results   Component Value Date     05/07/2020    K 4.8 05/07/2020    K 4.7 03/02/2020    CL 99 05/07/2020    CO2 24 05/07/2020    BUN 15 05/07/2020    LABALBU 4.5 05/07/2020    CREATININE 0.6 08/11/2020    CALCIUM 9.9 05/07/2020    LABGLOM >90 08/11/2020    GLUCOSE 97 05/07/2020       Lab Results   Component Value Date    ALKPHOS 68 05/07/2020    ALT 13 08/11/2020    AST 22 05/07/2020    PROT 7.2 05/07/2020    BILITOT 0.4 05/07/2020    LABALBU 4.5 05/07/2020       No results found for: MG    Lab Results   Component Value Date    PROTIME 11.5 (L) 08/11/2020         Lab Results   Component Value Date    LABA1C 6.9 07/25/2018       Lab Results   Component Value Date    TRIG 143 09/03/2019    HDL 55 09/03/2019    LDLCALC 60 09/03/2019       Lab Results   Component Value Date    TSH 3.990 09/03/2019         Testing Reviewed:      I haveindividually reviewed the below cardiac tests    EKG:    ECHO: No results found for this or any previous visit. STRESS:    CATH:    Assessment/Plan       Diagnosis Orders   1. ASCVD (arteriosclerotic cardiovascular disease)     2.  Angina at rest [facility])       ASCVD  DM  HTN    Denies any cardiac symptoms  BP well controlled  Continue statin, lisinopril  LDL is 62   Continue rest of the management  The patient is asked to make an attempt to improve diet and exercise patterns to aid in medical management of this problem. Advised more plant based nutrition/meditarrean diet   Advised patient to call office or seek immediate medical attention if there is any new onset of  any chest pain, sob, palpitations, lightheadedness, dizziness, orthopnea, PND or pedal edema. All medication side effects were discussed in details. Thank youfor allowing me to participate in the care of this patient. Please do not hesitate to contact me for any further questions. Return in about 1 year (around 8/26/2021) for Regular follow up, Review testing.        Electronically signed by Louie Sparks MD [facility]  8/26/2020 at 2:31 PM

## 2020-08-26 NOTE — PROGRESS NOTES
Pt here for 3 yr check up     Pt denies chest pain, sob, swelling in legs and feet, heart palpitations     Pt continues with lightheaded at times,

## 2020-09-10 ENCOUNTER — APPOINTMENT (OUTPATIENT)
Dept: GENERAL RADIOLOGY | Age: 70
End: 2020-09-10
Payer: MEDICARE

## 2020-09-10 ENCOUNTER — HOSPITAL ENCOUNTER (EMERGENCY)
Age: 70
Discharge: HOME | End: 2020-09-10
Attending: EMERGENCY MEDICINE
Payer: MEDICARE

## 2020-09-10 VITALS
BODY MASS INDEX: 26.21 KG/M2 | HEIGHT: 59 IN | DIASTOLIC BLOOD PRESSURE: 61 MMHG | TEMPERATURE: 96.8 F | WEIGHT: 130 LBS | RESPIRATION RATE: 16 BRPM | OXYGEN SATURATION: 98 % | HEART RATE: 94 BPM | SYSTOLIC BLOOD PRESSURE: 134 MMHG

## 2020-09-10 PROCEDURE — 99282 EMERGENCY DEPT VISIT SF MDM: CPT

## 2020-09-10 PROCEDURE — 99283 EMERGENCY DEPT VISIT LOW MDM: CPT

## 2020-09-10 PROCEDURE — 70360 X-RAY EXAM OF NECK: CPT

## 2020-09-10 RX ORDER — HYDROCODONE BITARTRATE AND ACETAMINOPHEN 5; 325 MG/1; MG/1
1 TABLET ORAL EVERY 6 HOURS PRN
Qty: 15 TABLET | Refills: 0 | Status: SHIPPED | OUTPATIENT
Start: 2020-09-10 | End: 2020-09-15

## 2020-09-10 RX ORDER — CYCLOBENZAPRINE HCL 10 MG
10 TABLET ORAL 3 TIMES DAILY PRN
Qty: 21 TABLET | Refills: 0 | Status: SHIPPED | OUTPATIENT
Start: 2020-09-10 | End: 2020-09-17

## 2020-09-10 ASSESSMENT — PAIN DESCRIPTION - PAIN TYPE: TYPE: ACUTE PAIN

## 2020-09-10 ASSESSMENT — PAIN SCALES - GENERAL: PAINLEVEL_OUTOF10: 8

## 2020-09-10 ASSESSMENT — PAIN DESCRIPTION - LOCATION: LOCATION: NECK

## 2020-09-10 ASSESSMENT — PAIN DESCRIPTION - ORIENTATION: ORIENTATION: RIGHT

## 2020-09-10 ASSESSMENT — PAIN DESCRIPTION - DESCRIPTORS: DESCRIPTORS: THROBBING

## 2020-09-10 NOTE — ED PROVIDER NOTES
[address] [facility]  [place] [address]  Phone: [address]    Chief Complaint   Patient presents with    Neck Pain       HPI    Lio Garg is a 81 y.o. female who presents above-noted complaint. Patient's been doing pretty well. She had her mother had a recent fall. She had to do some some slight assisting and she thinks she strained her neck in that area. She complains of pain discomfort of the right anterior neck area and right trapezius area. She denies weakness numbness or tingling hurts to twist her head neck and even to swallow. PAST MEDICAL HISTORY    Past Medical History:   Diagnosis Date    Anxiety     Blood transfusion     Diabetes mellitus ([facility] [address]     Diverticulosis of colon     Hyperlipidemia     Hypertension     Localized, primary osteoarthritis of shoulder region 8/14/2019    Nausea & vomiting        SURGICAL HISTORY    Past Surgical History:   Procedure Laterality Date    CARPAL TUNNEL RELEASE Right     CHOLECYSTECTOMY      COLONOSCOPY      ENDOSCOPY, COLON, DIAGNOSTIC      FOOT SURGERY Bilateral 2004    HYSTERECTOMY      JOINT REPLACEMENT Right 2009 2018    knee    LITHOTRIPSY      MECKEL DIVERTICULUM EXCISION      diverticulosis    SHOULDER SURGERY Left 1992       CURRENT MEDICATIONS    Current Outpatient Rx   Medication Sig Dispense Refill    cyclobenzaprine (FLEXERIL) 10 MG tablet Take 1 tablet by mouth 3 times daily as needed for Muscle spasms 21 tablet 0    HYDROcodone-acetaminophen (NORCO) 5-325 MG per tablet Take 1 tablet by mouth every 6 hours as needed for Pain for up to 5 days.  15 tablet 0    hydroxychloroquine (PLAQUENIL) 200 MG tablet 200 mg       tolterodine (DETROL) 1 MG tablet TAKE 1 TABLET BY MOUTH TWICE DAILY FOR 30 DAYS      Probiotic Product (PROBIOTIC DAILY PO) Take by mouth 2 times daily      dicyclomine (BENTYL) 10 MG capsule TAKE 1 CAPSULE BY MOUTH 4 TIMES DAILY AS NEEDED FOR CRAMPING PAIN      atorvastatin (LIPITOR) 20 MG tablet TAKE 1 TABLET BY MOUTH ONCE DAILY FOR 30 DAYS  11    TRULICITY 6.58 YV/2.2CN SOPN INJECT AS DIRECTED ONCE WEEKLY SUBCUTANEOUSLY FOR 30 DAYS  6    citalopram (CELEXA) 40 MG tablet Take 20 mg by mouth daily       Multiple Vitamins-Minerals (THERAPEUTIC MULTIVITAMIN-MINERALS) tablet Take 1 tablet by mouth daily      lisinopril (PRINIVIL;ZESTRIL) 5 MG tablet Take 5 mg by mouth daily.  metformin (GLUCOPHAGE) 1000 MG tablet Take 1,000 mg by mouth 2 times daily (with meals).          ALLERGIES    No Known Allergies    FAMILY HISTORY    Family History   Problem Relation Age of Onset    Cancer Father     Heart Disease Father     Arthritis Father     Arthritis Mother     Stroke Sister        SOCIAL HISTORY    Social History     Socioeconomic History    Marital status:      Spouse name: None    Number of children: None    Years of education: None    Highest education level: None   Occupational History    None   Social Needs    Financial resource strain: None    Food insecurity     Worry: None     Inability: None    Transportation needs     Medical: None     Non-medical: None   Tobacco Use    Smoking status: Never Smoker    Smokeless tobacco: Never Used   Substance and Sexual Activity    Alcohol use: Yes     Comment: rarely    Drug use: No    Sexual activity: None   Lifestyle    Physical activity     Days per week: None     Minutes per session: None    Stress: None   Relationships    Social connections     Talks on phone: None     Gets together: None     Attends Synagogue service: None     Active member of club or organization: None     Attends meetings of clubs or organizations: None     Relationship status: None    Intimate partner violence     Fear of current or ex partner: None     Emotionally abused: None     Physically abused: None     Forced sexual activity: None   Other Topics Concern    None Social History Narrative    None       REVIEW OF SYSTEMS    Neck pain without nausea vomiting or chest pain. No bowel bladder habit changes. All systems negative except as marked. PHYSICAL EXAM    VITAL SIGNS: /61   Pulse 94   Temp 96.8 °F (36 °C) (Temporal)   Resp 16   Ht 4' 11\" (1.499 m)   Wt 130 lb (59 kg)   SpO2 98%   BMI 26.26 kg/m²    Constitutional:  Alert not toxic or ill, pleasant alert  HENT: COVID mask protection in place normocephalic, Atraumatic, Nose normal.  Cervical Spine: Slight decreased range of motion although pain with twisting flexion anteriorly. There is no carotid bruits. Slight pain over the right deltoid. No clavicular pain. No stridor. No tenderness, Supple,  Eyes:  No discharge or  Swelling,Conjunctiva normal,EOMI,  Respiratory: No respiratory distress, Normal breath sounds,  No wheezing, No chest tenderness. Musculoskeletal:  Intact distal pulses, No edema, No tenderness, No cyanosis, No clubbing. Good range of motion in all major joints. No tenderness to palpation or major deformities noted. Back:No tenderness. Integument:  Warm, Dry, No erythema, No rash (on exposed areas)   Lymphatic:  No lymphadenopathy noted. Neurologic:  Alert & oriented x 3, Normal motor function, Normal sensory function, No focal deficits noted. Psychiatric:  Affect normal, Judgment normal, Mood normal.                   RADIOLOGY    XR NECK SOFT TISSUE   Final Result   1. Degenerative changes of the cervical spine, most pronounced at the C5-C6 level. No other acute findings. If there is continued clinical concern, further evaluation could be obtained with CT or MRI. **This report has been created using voice recognition software. It may contain minor errors which are inherent in voice recognition technology. **      Final report electronically signed by Dr. Liat Lopez on 9/10/2020 8:21 AM          PROCEDURES    none      CONSULTS:  None      CRITICAL Take 1 tablet by mouth 3 times daily as needed for Muscle spasms    HYDROCODONE-ACETAMINOPHEN (NORCO) 5-325 MG PER TABLET    Take 1 tablet by mouth every 6 hours as needed for Pain for up to 5 days.            Tory Heredia MD  09/14/20 [id number]

## 2020-09-10 NOTE — ED NOTES
AVS rev'd with pt. And copy given with Rx. X.2. Pulse regular. Extremities warm. Respirations regular and quiet. Mucous membranes pink & moist. Alert and oriented times 3. No nausea or vomiting. Range of motion within patient's limits. Skin pink, warm and dry. Calm and cooperative.      Leigh Collins RN  09/10/20 1975

## 2020-09-10 NOTE — ED NOTES
Pt. Presents ambulatory to ED with c/o rt. Sided neck pain, increases with movement and swallowing. Pt. Denies any injury other than assisting in picking her mother up off the floor after she fell a few days ago.       Christine Cardona RN  09/10/20 0337

## 2020-09-16 ENCOUNTER — HOSPITAL ENCOUNTER (OUTPATIENT)
Age: 70
Discharge: HOME | End: 2020-09-16
Payer: MEDICARE

## 2020-09-16 ENCOUNTER — HOSPITAL ENCOUNTER (OUTPATIENT)
Dept: GENERAL RADIOLOGY | Age: 70
Discharge: HOME | End: 2020-09-16
Payer: MEDICARE

## 2020-09-16 PROCEDURE — 72072 X-RAY EXAM THORAC SPINE 3VWS: CPT

## 2020-09-16 PROCEDURE — 72040 X-RAY EXAM NECK SPINE 2-3 VW: CPT

## 2020-09-17 ENCOUNTER — HOSPITAL ENCOUNTER (OUTPATIENT)
Dept: WOMENS IMAGING | Age: 70
Discharge: HOME | End: 2020-09-17
Payer: MEDICARE

## 2020-09-17 PROCEDURE — 77063 BREAST TOMOSYNTHESIS BI: CPT

## 2020-09-17 PROCEDURE — 77080 DXA BONE DENSITY AXIAL: CPT

## 2020-09-28 ENCOUNTER — NURSE ONLY (OUTPATIENT)
Dept: LAB | Age: 70
End: 2020-09-28

## 2020-09-29 LAB
T4 FREE: 1.79 NG/DL (ref 0.93–1.76)
TSH SERPL DL<=0.05 MIU/L-ACNC: 6.7 UIU/ML (ref 0.4–4.2)

## 2020-10-07 ENCOUNTER — HOSPITAL ENCOUNTER (OUTPATIENT)
Dept: ULTRASOUND IMAGING | Age: 70
Discharge: HOME | End: 2020-10-07
Payer: MEDICARE

## 2020-10-07 PROCEDURE — 76536 US EXAM OF HEAD AND NECK: CPT

## 2020-11-09 ENCOUNTER — HOSPITAL ENCOUNTER (OUTPATIENT)
Age: 70
Discharge: HOME | End: 2020-11-09
Payer: MEDICARE

## 2020-11-09 LAB
BASOPHILS # BLD: 0.2 %
BASOPHILS ABSOLUTE: 0 THOU/MM3 (ref 0–0.1)
EOSINOPHIL # BLD: 1 %
EOSINOPHILS ABSOLUTE: 0.1 THOU/MM3 (ref 0–0.4)
ERYTHROCYTE [DISTWIDTH] IN BLOOD BY AUTOMATED COUNT: 13.3 % (ref 11.5–14.5)
ERYTHROCYTE [DISTWIDTH] IN BLOOD BY AUTOMATED COUNT: 48 FL (ref 35–45)
HCT VFR BLD CALC: 38.3 % (ref 37–47)
HEMOGLOBIN: 12.1 GM/DL (ref 12–16)
IMMATURE GRANS (ABS): 0.01 THOU/MM3 (ref 0–0.07)
IMMATURE GRANULOCYTES: 0.2 %
LYMPHOCYTES # BLD: 23.8 %
LYMPHOCYTES ABSOLUTE: 1.5 THOU/MM3 (ref 1–4.8)
MCH RBC QN AUTO: 31.1 PG (ref 26–33)
MCHC RBC AUTO-ENTMCNC: 31.6 GM/DL (ref 32.2–35.5)
MCV RBC AUTO: 98.5 FL (ref 81–99)
MONOCYTES # BLD: 4.6 %
MONOCYTES ABSOLUTE: 0.3 THOU/MM3 (ref 0.4–1.3)
NUCLEATED RED BLOOD CELLS: 0 /100 WBC
PLATELET # BLD: 251 THOU/MM3 (ref 130–400)
PMV BLD AUTO: 11.3 FL (ref 9.4–12.4)
RBC # BLD: 3.89 MILL/MM3 (ref 4.2–5.4)
SEDIMENTATION RATE, ERYTHROCYTE: 16 MM/HR (ref 0–20)
SEG NEUTROPHILS: 70.2 %
SEGMENTED NEUTROPHILS ABSOLUTE COUNT: 4.3 THOU/MM3 (ref 1.8–7.7)
WBC # BLD: 6.1 THOU/MM3 (ref 4.8–10.8)

## 2020-11-09 PROCEDURE — 85025 COMPLETE CBC W/AUTO DIFF WBC: CPT

## 2020-11-09 PROCEDURE — 84443 ASSAY THYROID STIM HORMONE: CPT

## 2020-11-09 PROCEDURE — 36415 COLL VENOUS BLD VENIPUNCTURE: CPT

## 2020-11-09 PROCEDURE — 84439 ASSAY OF FREE THYROXINE: CPT

## 2020-11-09 PROCEDURE — 82565 ASSAY OF CREATININE: CPT

## 2020-11-09 PROCEDURE — 84460 ALANINE AMINO (ALT) (SGPT): CPT

## 2020-11-09 PROCEDURE — 85651 RBC SED RATE NONAUTOMATED: CPT

## 2020-11-10 LAB
ALT SERPL-CCNC: 16 U/L (ref 11–66)
CREAT SERPL-MCNC: 0.7 MG/DL (ref 0.4–1.2)
GFR SERPL CREATININE-BSD FRML MDRD: 83 ML/MIN/1.73M2
T4 FREE: 1.29 NG/DL (ref 0.93–1.76)
TSH SERPL DL<=0.05 MIU/L-ACNC: 2.98 UIU/ML (ref 0.4–4.2)

## 2020-11-25 ENCOUNTER — HOSPITAL ENCOUNTER (OUTPATIENT)
Age: 70
Discharge: HOME | End: 2020-11-25
Payer: MEDICARE

## 2020-11-25 ENCOUNTER — HOSPITAL ENCOUNTER (OUTPATIENT)
Dept: GENERAL RADIOLOGY | Age: 70
Discharge: HOME | End: 2020-11-25
Payer: MEDICARE

## 2020-11-25 PROCEDURE — 73000 X-RAY EXAM OF COLLAR BONE: CPT

## 2021-02-22 ENCOUNTER — NURSE ONLY (OUTPATIENT)
Dept: LAB | Age: 71
End: 2021-02-22

## 2021-02-23 LAB
ALT SERPL-CCNC: 15 U/L (ref 11–66)
BASOPHILS # BLD: 0.2 %
BASOPHILS ABSOLUTE: 0 THOU/MM3 (ref 0–0.1)
CREAT SERPL-MCNC: 0.8 MG/DL (ref 0.4–1.2)
EOSINOPHIL # BLD: 1.3 %
EOSINOPHILS ABSOLUTE: 0.1 THOU/MM3 (ref 0–0.4)
ERYTHROCYTE [DISTWIDTH] IN BLOOD BY AUTOMATED COUNT: 13.2 % (ref 11.5–14.5)
ERYTHROCYTE [DISTWIDTH] IN BLOOD BY AUTOMATED COUNT: 46.1 FL (ref 35–45)
GFR SERPL CREATININE-BSD FRML MDRD: 71 ML/MIN/1.73M2
HCT VFR BLD CALC: 38.2 % (ref 37–47)
HEMOGLOBIN: 12 GM/DL (ref 12–16)
IMMATURE GRANS (ABS): 0.02 THOU/MM3 (ref 0–0.07)
IMMATURE GRANULOCYTES: 0.4 %
LYMPHOCYTES # BLD: 40.3 %
LYMPHOCYTES ABSOLUTE: 1.8 THOU/MM3 (ref 1–4.8)
MCH RBC QN AUTO: 30.1 PG (ref 26–33)
MCHC RBC AUTO-ENTMCNC: 31.4 GM/DL (ref 32.2–35.5)
MCV RBC AUTO: 95.7 FL (ref 81–99)
MONOCYTES # BLD: 6.4 %
MONOCYTES ABSOLUTE: 0.3 THOU/MM3 (ref 0.4–1.3)
NUCLEATED RED BLOOD CELLS: 0 /100 WBC
PLATELET # BLD: 267 THOU/MM3 (ref 130–400)
PMV BLD AUTO: 11 FL (ref 9.4–12.4)
RBC # BLD: 3.99 MILL/MM3 (ref 4.2–5.4)
SEDIMENTATION RATE, ERYTHROCYTE: 15 MM/HR (ref 0–20)
SEG NEUTROPHILS: 51.4 %
SEGMENTED NEUTROPHILS ABSOLUTE COUNT: 2.3 THOU/MM3 (ref 1.8–7.7)
WBC # BLD: 4.5 THOU/MM3 (ref 4.8–10.8)

## 2021-03-23 ENCOUNTER — TELEPHONE (OUTPATIENT)
Dept: PHARMACY | Facility: CLINIC | Age: 71
End: 2021-03-23

## 2021-03-23 RX ORDER — MELOXICAM 7.5 MG/1
7.5 TABLET ORAL DAILY
COMMUNITY

## 2021-03-23 RX ORDER — LEVOTHYROXINE SODIUM 0.03 MG/1
25 TABLET ORAL DAILY
COMMUNITY

## 2021-03-23 RX ORDER — METFORMIN HYDROCHLORIDE 500 MG/1
1000 TABLET, EXTENDED RELEASE ORAL 2 TIMES DAILY
COMMUNITY

## 2021-03-23 NOTE — TELEPHONE ENCOUNTER
[facility] CLINICAL PHARMACY: STATIN THERAPY REVIEW  Identified statin use in persons with cardiovascular disease care gap per [employer]. Records dated 2/12/21. Last Office Visit: unknown - PCP appears to be an affiliate     Patient also appears to be taking: metformin (LF 2/11/21 90-ds) and lisinopril (LF 3/1/21 90-ds)    Patient found in Outcomes [place] and is currently eligible for CMR    ASSESSMENT:  Patient has been identified as having a diagnosis for clinical ASCVD or event (e.g., inpatient hospitalization for MI, CABG, PCI or other revascularization procedures) in the measurement year and not currently filling a moderate or high intensity statin. Patients included in this care gap are males age 18-72 and females age 43-69. Per chart review, patient is prescribed atorvastatin 20 mg daily. Per Outcomes Records:  Atorvastatin last filled on 12/12/20 for a 90 day supply. Per [facility]:   Atorvastatin last picked up on 12/12/20. There are refills remaining. Lab Results   Component Value Date    CHOL 130 09/08/2020    TRIG 110 09/08/2020    HDL 50 09/08/2020    LDLCALC 58 09/08/2020     ALT   Date Value Ref Range Status   02/22/2021 15 11 - 66 U/L Final     Comment:     Performed at [facility], [address]     AST   Date Value Ref Range Status   09/08/2020 18 5 - 40 U/L Final       The 10-year ASCVD risk score (Sofiya Goldman et al., 2013) is: 18%    Values used to calculate the score:      Age: 78 years      Sex: Female      Is Non- : No      Diabetic: Yes      Tobacco smoker: No      Systolic Blood Pressure: 596 mmHg      Is BP treated: Yes      HDL Cholesterol: 50 mg/dL      Total Cholesterol: 130 mg/dL     Hyperlipidemia Goal: Patient is prescribed moderate-intensity statin therapy. PLAN:  Attempting to reach patient to review.  Left message asking for return call.  Will attempt to contact the patient again to discuss atorvastatin adherence and complete CMR.      Hildegarde Habermann, PharmD, [facility]  Direct: [phone]  Department, toll free [phone], option 7

## 2021-03-23 NOTE — LETTER
[place]  [address], [place] [address] [facility] [address] [phone]           03/25/21     Dear Fabrizio Curran,    We tried to reach you recently regarding your atorvastatin, but were unable to reach you on the telephone. We have on file that you are currently taking atorvastatin 20 mg once daily. If you are no longer taking or taking differently, please call us at the number below so that we can discuss this and update your medication profile. It appears that this medication has not been filled at proper times. We are worried you might be missing doses or not taking it as directed. It is important that you take your medications regularly and try not to miss a single dose. Additionally, it appears you are eligible for a yearly medication review by a clinical pharmacist. This is a free service provided and can be completed over the phone. If you are interested in this service, please contact us at the number below to schedule your appointment!      Sincerely,   Loan Craft, PharmD, [place]  Direct: [phone]  Department, toll free [phone], option 7

## 2021-03-25 NOTE — TELEPHONE ENCOUNTER
Second attempt to contact the patient in regards to atorvastatin therapy and to complete CMR. Left message for patient to return call. 10 42 [facility] staff will process 90-ds of atorvastatin. Billed through Ian Anjel. Will prepare and send a letter to the patient today and will sign off at this time.      Yesi Kramer, PharmD, [facility]  Direct: [phone]  Department, toll free [phone], option 7          For Pharmacy Admin Tracking Only    PHSO: Yes  Total # of Interventions Recommended: 0  - New Order #: 0 New Medication Order Reason(s): Needs Additional Medication Therapy  - Maintenance Safety Lab Monitoring #: 1  Recommended intervention potential cost savings: 0  Total Interventions Accepted: 0  Time Spent (min): 15

## 2021-07-21 ENCOUNTER — HOSPITAL ENCOUNTER (EMERGENCY)
Age: 71
Discharge: HOME | End: 2021-07-21
Attending: FAMILY MEDICINE
Payer: MEDICARE

## 2021-07-21 VITALS
OXYGEN SATURATION: 99 % | DIASTOLIC BLOOD PRESSURE: 73 MMHG | HEART RATE: 95 BPM | RESPIRATION RATE: 16 BRPM | TEMPERATURE: 98 F | SYSTOLIC BLOOD PRESSURE: 119 MMHG

## 2021-07-21 DIAGNOSIS — K08.89 PAIN, DENTAL: Primary | ICD-10-CM

## 2021-07-21 PROCEDURE — 99283 EMERGENCY DEPT VISIT LOW MDM: CPT

## 2021-07-21 RX ORDER — AMOXICILLIN AND CLAVULANATE POTASSIUM 875; 125 MG/1; MG/1
1 TABLET, FILM COATED ORAL 2 TIMES DAILY
Qty: 14 TABLET | Refills: 0 | Status: SHIPPED | OUTPATIENT
Start: 2021-07-21 | End: 2021-07-28

## 2021-07-21 RX ORDER — HYDROCODONE BITARTRATE AND ACETAMINOPHEN 5; 325 MG/1; MG/1
1 TABLET ORAL EVERY 6 HOURS PRN
Qty: 20 TABLET | Refills: 0 | Status: SHIPPED | OUTPATIENT
Start: 2021-07-21 | End: 2021-07-24

## 2021-07-21 ASSESSMENT — PAIN DESCRIPTION - LOCATION: LOCATION: TEETH

## 2021-07-21 ASSESSMENT — PAIN SCALES - GENERAL
PAINLEVEL_OUTOF10: 7
PAINLEVEL_OUTOF10: 2

## 2021-07-21 NOTE — ED NOTES
Discharge teaching and instructions for condition explained to patient. AVS reviewed. Went over prescriptions with patient. Patient voiced understanding regarding prescriptions, follow up appointments and care of self at home. Pt discharged to home in stable condition per self.        Sharon Nichols RN  07/21/21 [id number]

## 2021-07-21 NOTE — ED NOTES
Patient presents today complaining of right upper tooth pain. She reports having 2 molars that need pulled and has a dentists appointment this coming Monday. Her dentist wasn't able to see her any sooner. Complains of throbbing in her teeth, difficult to chews.      Edwin Price RN  07/21/21 0244

## 2021-07-22 ASSESSMENT — ENCOUNTER SYMPTOMS
DIARRHEA: 0
SORE THROAT: 0
NAUSEA: 0
BACK PAIN: 0
VOMITING: 0
WHEEZING: 0
ABDOMINAL PAIN: 0
CHEST TIGHTNESS: 0
COLOR CHANGE: 0
SHORTNESS OF BREATH: 0
SINUS PRESSURE: 0
FACIAL SWELLING: 0

## 2021-07-22 NOTE — ED PROVIDER NOTES
[facility]          CHIEF COMPLAINT       Chief Complaint   Patient presents with    Dental Pain       Nurses Notes reviewed and I agree except as noted in the HPI. HISTORY OF PRESENT ILLNESS    Vandana Srivastava is a 78 y.o. female who presents for evaluation of right upper molar pain. She states that her last 2 upper right molars need to be pulled. She has seen her dentist and was treated with antibiotics a couple of weeks ago but she developed severe pain yesterday. She states that pain continues to be present. Is throbbing. Chewing is difficult. She rates her pain at 7/10 severity. She is concerned that she may be developing another infection. She has an upcoming appoint with her dentist on 7/26/2021. Patient has been taking ibuprofen without relief. REVIEW OF SYSTEMS     Review of Systems   Constitutional: Negative for appetite change, chills, fatigue and fever. HENT: Positive for dental problem. Negative for facial swelling (minimal right sided), sinus pressure and sore throat. Respiratory: Negative for chest tightness, shortness of breath and wheezing. Cardiovascular: Negative for chest pain and leg swelling. Gastrointestinal: Negative for abdominal pain, diarrhea, nausea and vomiting. Genitourinary: Negative for dysuria, flank pain, frequency, vaginal bleeding and vaginal discharge. Musculoskeletal: Negative for back pain, gait problem, joint swelling and neck stiffness. Skin: Negative for color change and rash. Neurological: Negative for dizziness, light-headedness and headaches. Psychiatric/Behavioral: Negative for agitation and hallucinations. The patient is not nervous/anxious.         PAST MEDICAL HISTORY    has a past medical history of Anxiety, Blood transfusion, Diabetes mellitus ([address] Diverticulosis of colon, Hyperlipidemia, Hypertension, Localized, primary osteoarthritis of shoulder region, and Nausea & vomiting. SURGICAL HISTORY      has a past surgical history that includes joint replacement (Right, 2009); Cholecystectomy; Foot surgery (Bilateral, ); shoulder surgery (Left, ); Lithotripsy; Hysterectomy; Colonoscopy; Endoscopy, colon, diagnostic; Meckel's diverticulum excision; and Carpal tunnel release (Right). CURRENT MEDICATIONS       Discharge Medication List as of 2021  7:19 PM      CONTINUE these medications which have NOT CHANGED    Details   levothyroxine (SYNTHROID) 25 MCG tablet Take 25 mcg by mouth DailyHistorical Med      meloxicam (MOBIC) 7.5 MG tablet Take 7.5 mg by mouth dailyHistorical Med      metFORMIN (GLUCOPHAGE-XR) 500 MG extended release tablet Take 1,000 mg by mouth 2 times dailyHistorical Med      hydroxychloroquine (PLAQUENIL) 200 MG tablet Take 300 mg by mouth daily Historical Med      tolterodine (DETROL) 1 MG tablet Take 1 mg by mouth 2 times daily Historical Med      Probiotic Product (PROBIOTIC DAILY PO) Take by mouth 2 times dailyHistorical Med      dicyclomine (BENTYL) 10 MG capsule TAKE 1 CAPSULE BY MOUTH 4 TIMES DAILY AS NEEDED FOR CRAMPING PAINHistorical Med      atorvastatin (LIPITOR) 20 MG tablet Take 20 mg by mouth daily , [id number] Med      TRULICITY 1.5 JK/6.6VA SOPN Inject 1.5 mg into the skin once a week , R-6, DAWHistorical Med      citalopram (CELEXA) 20 MG tablet Take 20 mg by mouth daily Historical Med      Multiple Vitamins-Minerals (THERAPEUTIC MULTIVITAMIN-MINERALS) tablet Take 1 tablet by mouth daily      lisinopril (PRINIVIL;ZESTRIL) 2.5 MG tablet Take 2.5 mg by mouth daily Historical Med             ALLERGIES     has No Known Allergies. FAMILY HISTORY     She indicated that her mother is alive. She indicated that her father is . She indicated that her sister is alive. She indicated that only one of her two brothers is alive.    family history includes Arthritis in her father and mother; Cancer in her father; Heart Disease in her father; Stroke in her sister. SOCIAL HISTORY      reports that she has never smoked. She has never used smokeless tobacco. She reports current alcohol use. She reports that she does not use drugs. PHYSICAL EXAM     INITIAL VITALS:  temperature is 98 °F (36.7 °C). Her blood pressure is 119/73 and her pulse is 95. Her respiration is 16 and oxygen saturation is 99%. Physical Exam  Vitals and nursing note reviewed. Constitutional:       General: She is not in acute distress. Appearance: She is well-developed. HENT:      Head: Normocephalic and atraumatic. Mouth/Throat:      Mouth: Mucous membranes are moist.      Pharynx: No posterior oropharyngeal erythema. Comments: No palpable periodontal abscess. Patient's right upper molars are tender to palpation and grayish-brown in color  Eyes:      General:         Right eye: No discharge. Left eye: No discharge. Conjunctiva/sclera: Conjunctivae normal.   Cardiovascular:      Rate and Rhythm: Normal rate and regular rhythm. Heart sounds: Normal heart sounds. Pulmonary:      Effort: Pulmonary effort is normal.      Breath sounds: Normal breath sounds. Abdominal:      General: Bowel sounds are normal. There is no distension. Palpations: Abdomen is soft. There is no mass. Tenderness: There is no abdominal tenderness. Musculoskeletal:      Cervical back: Normal range of motion and neck supple. Lymphadenopathy:      Cervical: No cervical adenopathy. Skin:     General: Skin is warm and dry. Findings: No erythema. Comments: Minimal fullness to the right side of the face. Neurological:      Mental Status: She is alert and oriented to person, place, and time.    Psychiatric:         Mood and Affect: Mood normal.         Behavior: Behavior normal.         DIFFERENTIAL DIAGNOSIS:   Dental pain, periodontal abscess,    DIAGNOSTIC RESULTS       RADIOLOGY: non-plain filmimages(s) such as CT, Ultrasound and MRI are read by the radiologist.  No orders to display         LABS:   Labs Reviewed - No data to display    DEPARTMENT COURSE:   Vitals:    Vitals:    07/21/21 1814   BP: 119/73   Pulse: 95   Resp: 16   Temp: 98 °F (36.7 °C)   SpO2: 99%       MDM:  Patient presents for evaluation of dental pain. She is prescribed Augmentin for ease of twice daily dosing. She is also given prescription for Norco for use when pain is severe. She will follow-up with her dentist as previously prescribed. CRITICAL CARE:   None    CONSULTS:  None    PROCEDURES:  None    FINAL IMPRESSION      1. Pain, dental          DISPOSITION/PLAN   Discharge    PATIENT REFERRED TO:  Your dentist    Go on 7/26/2021  previousl scheduled appointment      DISCHARGEMEDICATIONS:  Discharge Medication List as of 7/21/2021  7:19 PM      START taking these medications    Details   HYDROcodone-acetaminophen (NORCO) 5-325 MG per tablet Take 1 tablet by mouth every 6 hours as needed for Pain for up to 20 doses. , Disp-20 tablet, R-0Print      amoxicillin-clavulanate (AUGMENTIN) 875-125 MG per tablet Take 1 tablet by mouth 2 times daily for 7 days, Disp-14 tablet, R-0Normal             (Please note that portions of this note were completedwith a voice recognition program.  Efforts were made to edit the dictations but occasionally words are mis-transcribed.)    MD Candi Greco MD  07/22/21 2966

## 2021-08-18 ENCOUNTER — NURSE ONLY (OUTPATIENT)
Dept: LAB | Age: 71
End: 2021-08-18

## 2021-08-18 ENCOUNTER — OFFICE VISIT (OUTPATIENT)
Dept: CARDIOLOGY CLINIC | Age: 71
End: 2021-08-18
Payer: MEDICARE

## 2021-08-18 VITALS
SYSTOLIC BLOOD PRESSURE: 130 MMHG | HEART RATE: 87 BPM | HEIGHT: 59 IN | WEIGHT: 125 LBS | DIASTOLIC BLOOD PRESSURE: 78 MMHG | BODY MASS INDEX: 25.2 KG/M2

## 2021-08-18 DIAGNOSIS — I25.10 ASCVD (ARTERIOSCLEROTIC CARDIOVASCULAR DISEASE): Primary | ICD-10-CM

## 2021-08-18 LAB
ALT SERPL-CCNC: 13 U/L (ref 11–66)
BASOPHILS # BLD: 0.2 %
BASOPHILS ABSOLUTE: 0 THOU/MM3 (ref 0–0.1)
CREAT SERPL-MCNC: 0.8 MG/DL (ref 0.4–1.2)
EOSINOPHIL # BLD: 1.5 %
EOSINOPHILS ABSOLUTE: 0.1 THOU/MM3 (ref 0–0.4)
ERYTHROCYTE [DISTWIDTH] IN BLOOD BY AUTOMATED COUNT: 12.6 % (ref 11.5–14.5)
ERYTHROCYTE [DISTWIDTH] IN BLOOD BY AUTOMATED COUNT: 45.7 FL (ref 35–45)
GFR SERPL CREATININE-BSD FRML MDRD: 71 ML/MIN/1.73M2
HCT VFR BLD CALC: 36.5 % (ref 37–47)
HEMOGLOBIN: 11.4 GM/DL (ref 12–16)
IMMATURE GRANS (ABS): 0.01 THOU/MM3 (ref 0–0.07)
IMMATURE GRANULOCYTES: 0.2 %
LYMPHOCYTES # BLD: 27.4 %
LYMPHOCYTES ABSOLUTE: 1.5 THOU/MM3 (ref 1–4.8)
MCH RBC QN AUTO: 30.6 PG (ref 26–33)
MCHC RBC AUTO-ENTMCNC: 31.2 GM/DL (ref 32.2–35.5)
MCV RBC AUTO: 97.9 FL (ref 81–99)
MONOCYTES # BLD: 6 %
MONOCYTES ABSOLUTE: 0.3 THOU/MM3 (ref 0.4–1.3)
NUCLEATED RED BLOOD CELLS: 0 /100 WBC
PLATELET # BLD: 295 THOU/MM3 (ref 130–400)
PMV BLD AUTO: 10.7 FL (ref 9.4–12.4)
RBC # BLD: 3.73 MILL/MM3 (ref 4.2–5.4)
SEDIMENTATION RATE, ERYTHROCYTE: 26 MM/HR (ref 0–20)
SEG NEUTROPHILS: 64.7 %
SEGMENTED NEUTROPHILS ABSOLUTE COUNT: 3.6 THOU/MM3 (ref 1.8–7.7)
WBC # BLD: 5.5 THOU/MM3 (ref 4.8–10.8)

## 2021-08-18 PROCEDURE — 99213 OFFICE O/P EST LOW 20 MIN: CPT | Performed by: INTERNAL MEDICINE

## 2021-08-18 PROCEDURE — 93000 ELECTROCARDIOGRAM COMPLETE: CPT | Performed by: INTERNAL MEDICINE

## 2021-08-18 NOTE — PROGRESS NOTES
[address] [place], [place] [address] [facility] [address]  Dept: [phone]  Dept Fax: [phone]  Loc: [phone]    Visit Date: 8/18/2021    Ms. Mario Edouard is a 78 y.o. female  who presented for:  Chief Complaint   Patient presents with    Check-Up     ASCVD    Hypertension       HPI:   80 yo F c hx of DM, HTN, is here for afollow up. Denies any chest pain, sob, palpitations, lightheadedness, dizziness, orthopnea, PND or pedal edema. Current Outpatient Medications:     levothyroxine (SYNTHROID) 25 MCG tablet, Take 25 mcg by mouth Daily, Disp: , Rfl:     meloxicam (MOBIC) 7.5 MG tablet, Take 7.5 mg by mouth daily, Disp: , Rfl:     metFORMIN (GLUCOPHAGE-XR) 500 MG extended release tablet, Take 1,000 mg by mouth 2 times daily, Disp: , Rfl:     hydroxychloroquine (PLAQUENIL) 200 MG tablet, Take 300 mg by mouth daily , Disp: , Rfl:     tolterodine (DETROL) 1 MG tablet, Take 1 mg by mouth 2 times daily , Disp: , Rfl:     Probiotic Product (PROBIOTIC DAILY PO), Take by mouth 2 times daily, Disp: , Rfl:     dicyclomine (BENTYL) 10 MG capsule, TAKE 1 CAPSULE BY MOUTH 4 TIMES DAILY AS NEEDED FOR CRAMPING PAIN, Disp: , Rfl:     atorvastatin (LIPITOR) 20 MG tablet, Take 20 mg by mouth daily , Disp: , Rfl: 11    TRULICITY 1.5 KI/3.3SY SOPN, Inject 1.5 mg into the skin once a week , Disp: , Rfl: 6    citalopram (CELEXA) 20 MG tablet, Take 20 mg by mouth daily , Disp: , Rfl:     Multiple Vitamins-Minerals (THERAPEUTIC MULTIVITAMIN-MINERALS) tablet, Take 1 tablet by mouth daily, Disp: , Rfl:     lisinopril (PRINIVIL;ZESTRIL) 2.5 MG tablet, Take 2.5 mg by mouth daily , Disp: , Rfl:     Past Medical History  Edita Price  has a past medical history of Anxiety, Blood transfusion, Diabetes mellitus ([address] Diverticulosis of colon, Hyperlipidemia, Hypertension, Localized, primary osteoarthritis of shoulder region, and Nausea & vomiting.     Social History  Francisco Pablo  reports that she has never smoked. She has never used smokeless tobacco. She reports current alcohol use. She reports that she does not use drugs. Family History  Francisco Pablo family history includes Arthritis in her father and mother; Cancer in her father; Heart Disease in her father; Stroke in her sister. Past Surgical History   Past Surgical History:   Procedure Laterality Date    CARPAL TUNNEL RELEASE Right     CHOLECYSTECTOMY      COLONOSCOPY      ENDOSCOPY, COLON, DIAGNOSTIC      FOOT SURGERY Bilateral 2004    HYSTERECTOMY      JOINT REPLACEMENT Right 2009 2018    knee    LITHOTRIPSY      MECKEL DIVERTICULUM EXCISION      diverticulosis    SHOULDER SURGERY Left 1992       Subjective:     REVIEW OF SYSTEMS  Constitutional: denies sweats, chills and fever  HENT: denies  congestion, sinus pressure, sneezing and sore throat. Eyes: denies  pain, discharge, redness and itching. Respiratory: denies apnea, cough  Gastrointestinal: denies blood in stool, constipation, diarrhea   Endocrine: denies cold intolerance, heat intolerance, polydipsia. Genitourinary: denies dysuria, enuresis, flank pain and hematuria. Musculoskeletal: denies arthralgias, joint swelling and neck pain. Neurological: denies numbness and headaches. Psychiatric/Behavioral: denies agitation, confusion, decreased concentration and dysphoric mood    All others reviewed and are negative. Objective:     /78   Pulse 87   Ht 4' 11\" (1.499 m)   Wt 125 lb (56.7 kg)   BMI 25.25 kg/m²     Wt Readings from Last 3 Encounters:   08/18/21 125 lb (56.7 kg)   09/10/20 130 lb (59 kg)   08/26/20 129 lb (58.5 kg)     BP Readings from Last 3 Encounters:   08/18/21 130/78   07/21/21 119/73   09/10/20 134/61       PHYSICAL EXAM  Constitutional: Oriented to person, place, and time. Appears well-developed and well-nourished. HENT:   Head: Normocephalic and atraumatic.    Eyes: EOM are normal. Pupils are equal, round, and reactive to light. Neck: Normal range of motion. Neck supple. No JVD present. Cardiovascular: Normal rate , normal heart sounds and intact distal pulses. Pulmonary/Chest: Effort normal and breath sounds normal. No respiratory distress. No wheezes. No rales. Abdominal: Soft. Bowel sounds are normal. No distension. There is no tenderness. Musculoskeletal: Normal range of motion. No edema. Neurological: Alert and oriented to person, place, and time. No cranial nerve deficit. Coordination normal.   Skin: Skin is warm and dry. Psychiatric: Normal mood and affect. No results found for: CKTOTAL, CKMB, CKMBINDEX    Lab Results   Component Value Date    WBC 4.1 05/24/2021    RBC 3.63 05/24/2021    HGB 11.2 05/24/2021    HCT 35.6 05/24/2021    MCV 98.1 05/24/2021    MCH 30.9 05/24/2021    MCHC 31.5 05/24/2021    RDW 14.4 01/24/2017     05/24/2021    MPV 11.0 05/24/2021       Lab Results   Component Value Date     09/08/2020    K 4.4 09/08/2020    K 4.7 03/02/2020     09/08/2020    CO2 24 09/08/2020    BUN 14 09/08/2020    LABALBU 4.3 09/08/2020    CREATININE 0.8 05/24/2021    CALCIUM 10.4 09/08/2020    LABGLOM 71 05/24/2021    GLUCOSE 108 09/08/2020       Lab Results   Component Value Date    ALKPHOS 70 09/08/2020    ALT 16 05/24/2021    AST 18 09/08/2020    PROT 7.9 09/08/2020    BILITOT 0.4 09/08/2020    LABALBU 4.3 09/08/2020       No results found for: MG    Lab Results   Component Value Date    PROTIME 11.5 (L) 08/11/2020         Lab Results   Component Value Date    LABA1C 6.9 07/25/2018       Lab Results   Component Value Date    TRIG 110 09/08/2020    HDL 50 09/08/2020    LDLCALC 58 09/08/2020       Lab Results   Component Value Date    TSH 5.060 03/01/2021         Testing Reviewed:      I haveindividually reviewed the below cardiac tests    EKG:    ECHO: No results found for this or any previous visit. STRESS:    CATH:    Assessment/Plan       Diagnosis Orders   1.  ASCVD (arteriosclerotic cardiovascular disease)  EKG 12 Lead     ASCVD  DM  HTN    Denies any cardiac symptoms  BP well controlled  Continue statin, lisinopril  LDL is 62   Continue rest of the management  The patient is asked to make an attempt to improve diet and exercise patterns to aid in medical management of this problem. Advised more plant based nutrition/meditarrean diet   Advised patient to call office or seek immediate medical attention if there is any new onset of  any chest pain, sob, palpitations, lightheadedness, dizziness, orthopnea, PND or pedal edema. All medication side effects were discussed in details. Thank youfor allowing me to participate in the care of this patient. Please do not hesitate to contact me for any further questions. Return if symptoms worsen or fail to improve, for Review testing, Regular follow up.        Electronically signed by Yoseph Shaikh MD [facility]  8/18/2021 at 2:31 PM

## 2021-09-09 LAB
AVERAGE GLUCOSE: NORMAL
HBA1C MFR BLD: 6.6 %

## 2021-09-13 ENCOUNTER — NURSE ONLY (OUTPATIENT)
Dept: LAB | Age: 71
End: 2021-09-13

## 2021-09-13 LAB
ALBUMIN SERPL-MCNC: 4.7 G/DL (ref 3.5–5.1)
ALP BLD-CCNC: 69 U/L (ref 38–126)
ALT SERPL-CCNC: 19 U/L (ref 11–66)
ANION GAP SERPL CALCULATED.3IONS-SCNC: 13 MEQ/L (ref 8–16)
AST SERPL-CCNC: 25 U/L (ref 5–40)
BACTERIA: ABNORMAL
BASOPHILS # BLD: 0.3 %
BASOPHILS ABSOLUTE: 0 THOU/MM3 (ref 0–0.1)
BILIRUB SERPL-MCNC: 0.5 MG/DL (ref 0.3–1.2)
BILIRUBIN URINE: NEGATIVE
BLOOD, URINE: NEGATIVE
BUN BLDV-MCNC: 15 MG/DL (ref 7–22)
CALCIUM SERPL-MCNC: 10.4 MG/DL (ref 8.5–10.5)
CASTS: ABNORMAL /LPF
CASTS: ABNORMAL /LPF
CHARACTER, URINE: CLEAR
CHLORIDE BLD-SCNC: 103 MEQ/L (ref 98–111)
CHOLESTEROL, TOTAL: 119 MG/DL (ref 100–199)
CO2: 22 MEQ/L (ref 23–33)
COLOR: YELLOW
CREAT SERPL-MCNC: 0.7 MG/DL (ref 0.4–1.2)
CREATININE, URINE: 254.3 MG/DL
CRYSTALS: ABNORMAL
EOSINOPHIL # BLD: 1.7 %
EOSINOPHILS ABSOLUTE: 0.1 THOU/MM3 (ref 0–0.4)
EPITHELIAL CELLS, UA: ABNORMAL /HPF
ERYTHROCYTE [DISTWIDTH] IN BLOOD BY AUTOMATED COUNT: 12.9 % (ref 11.5–14.5)
ERYTHROCYTE [DISTWIDTH] IN BLOOD BY AUTOMATED COUNT: 46.7 FL (ref 35–45)
GFR SERPL CREATININE-BSD FRML MDRD: 83 ML/MIN/1.73M2
GLUCOSE FASTING: 110 MG/DL (ref 70–108)
GLUCOSE, URINE: NEGATIVE MG/DL
HCT VFR BLD CALC: 39 % (ref 37–47)
HDLC SERPL-MCNC: 53 MG/DL
HEMOGLOBIN: 12.5 GM/DL (ref 12–16)
IMMATURE GRANS (ABS): 0.02 THOU/MM3 (ref 0–0.07)
IMMATURE GRANULOCYTES: 0.3 %
KETONES, URINE: ABNORMAL
LDL CHOLESTEROL CALCULATED: 35 MG/DL
LEUKOCYTE EST, POC: ABNORMAL
LYMPHOCYTES # BLD: 27.2 %
LYMPHOCYTES ABSOLUTE: 1.6 THOU/MM3 (ref 1–4.8)
MCH RBC QN AUTO: 31 PG (ref 26–33)
MCHC RBC AUTO-ENTMCNC: 32.1 GM/DL (ref 32.2–35.5)
MCV RBC AUTO: 96.8 FL (ref 81–99)
MICROALBUMIN UR-MCNC: 3.63 MG/DL
MICROALBUMIN/CREAT UR-RTO: 14 MG/G (ref 0–30)
MISCELLANEOUS LAB TEST RESULT: ABNORMAL
MONOCYTES # BLD: 5.8 %
MONOCYTES ABSOLUTE: 0.3 THOU/MM3 (ref 0.4–1.3)
MUCUS: ABNORMAL
NITRITE, URINE: NEGATIVE
NUCLEATED RED BLOOD CELLS: 0 /100 WBC
PH UA: 6 (ref 5–9)
PLATELET # BLD: 293 THOU/MM3 (ref 130–400)
PMV BLD AUTO: 10.6 FL (ref 9.4–12.4)
POTASSIUM SERPL-SCNC: 4.5 MEQ/L (ref 3.5–5.2)
PROTEIN UA: 30 MG/DL
RBC # BLD: 4.03 MILL/MM3 (ref 4.2–5.4)
RBC URINE: ABNORMAL /HPF
RENAL EPITHELIAL, UA: ABNORMAL
SEG NEUTROPHILS: 64.7 %
SEGMENTED NEUTROPHILS ABSOLUTE COUNT: 3.7 THOU/MM3 (ref 1.8–7.7)
SODIUM BLD-SCNC: 138 MEQ/L (ref 135–145)
SPECIFIC GRAVITY UA: 1.02 (ref 1–1.03)
T4 FREE: 1.32 NG/DL (ref 0.93–1.76)
TOTAL PROTEIN: 7.6 G/DL (ref 6.1–8)
TRIGL SERPL-MCNC: 154 MG/DL (ref 0–199)
TSH SERPL DL<=0.05 MIU/L-ACNC: 4.93 UIU/ML (ref 0.4–4.2)
UROBILINOGEN, URINE: 0.2 EU/DL (ref 0–1)
WBC # BLD: 5.7 THOU/MM3 (ref 4.8–10.8)
WBC UA: ABNORMAL /HPF
YEAST: ABNORMAL

## 2021-11-16 ENCOUNTER — NURSE ONLY (OUTPATIENT)
Dept: LAB | Age: 71
End: 2021-11-16

## 2021-11-16 LAB
BASOPHILS # BLD: 0.3 %
BASOPHILS ABSOLUTE: 0 THOU/MM3 (ref 0–0.1)
EOSINOPHIL # BLD: 2.5 %
EOSINOPHILS ABSOLUTE: 0.2 THOU/MM3 (ref 0–0.4)
ERYTHROCYTE [DISTWIDTH] IN BLOOD BY AUTOMATED COUNT: 13 % (ref 11.5–14.5)
ERYTHROCYTE [DISTWIDTH] IN BLOOD BY AUTOMATED COUNT: 46.4 FL (ref 35–45)
HCT VFR BLD CALC: 39.9 % (ref 37–47)
HEMOGLOBIN: 12.6 GM/DL (ref 12–16)
IMMATURE GRANS (ABS): 0.02 THOU/MM3 (ref 0–0.07)
IMMATURE GRANULOCYTES: 0.3 %
LYMPHOCYTES # BLD: 30 %
LYMPHOCYTES ABSOLUTE: 1.8 THOU/MM3 (ref 1–4.8)
MCH RBC QN AUTO: 30.6 PG (ref 26–33)
MCHC RBC AUTO-ENTMCNC: 31.6 GM/DL (ref 32.2–35.5)
MCV RBC AUTO: 96.8 FL (ref 81–99)
MONOCYTES # BLD: 5.3 %
MONOCYTES ABSOLUTE: 0.3 THOU/MM3 (ref 0.4–1.3)
NUCLEATED RED BLOOD CELLS: 0 /100 WBC
PLATELET # BLD: 315 THOU/MM3 (ref 130–400)
PMV BLD AUTO: 10.9 FL (ref 9.4–12.4)
RBC # BLD: 4.12 MILL/MM3 (ref 4.2–5.4)
SEG NEUTROPHILS: 61.6 %
SEGMENTED NEUTROPHILS ABSOLUTE COUNT: 3.7 THOU/MM3 (ref 1.8–7.7)
WBC # BLD: 6 THOU/MM3 (ref 4.8–10.8)

## 2021-11-17 LAB
ALT SERPL-CCNC: 14 U/L (ref 11–66)
CREAT SERPL-MCNC: 0.8 MG/DL (ref 0.4–1.2)
GFR SERPL CREATININE-BSD FRML MDRD: 71 ML/MIN/1.73M2
SEDIMENTATION RATE, ERYTHROCYTE: 20 MM/HR (ref 0–20)

## 2021-12-06 ENCOUNTER — APPOINTMENT (OUTPATIENT)
Dept: CT IMAGING | Age: 71
End: 2021-12-06
Payer: MEDICARE

## 2021-12-06 ENCOUNTER — HOSPITAL ENCOUNTER (EMERGENCY)
Age: 71
Discharge: HOME | End: 2021-12-06
Attending: EMERGENCY MEDICINE
Payer: MEDICARE

## 2021-12-06 VITALS
SYSTOLIC BLOOD PRESSURE: 107 MMHG | OXYGEN SATURATION: 97 % | TEMPERATURE: 97.8 F | BODY MASS INDEX: 23.39 KG/M2 | DIASTOLIC BLOOD PRESSURE: 80 MMHG | HEART RATE: 105 BPM | HEIGHT: 59 IN | WEIGHT: 116 LBS | RESPIRATION RATE: 15 BRPM

## 2021-12-06 DIAGNOSIS — J06.9 UPPER RESPIRATORY TRACT INFECTION, UNSPECIFIED TYPE: ICD-10-CM

## 2021-12-06 DIAGNOSIS — R10.31 ABDOMINAL PAIN, RIGHT LOWER QUADRANT: Primary | ICD-10-CM

## 2021-12-06 DIAGNOSIS — N30.00 ACUTE CYSTITIS WITHOUT HEMATURIA: ICD-10-CM

## 2021-12-06 LAB
ALBUMIN SERPL-MCNC: 3.5 GM/DL (ref 3.4–5)
ALP BLD-CCNC: 90 U/L (ref 46–116)
ALT SERPL-CCNC: 29 U/L (ref 14–63)
AMORPHOUS: ABNORMAL
ANION GAP: 12 MEQ/L (ref 8–16)
AST SERPL-CCNC: 20 U/L (ref 15–37)
BACTERIA: ABNORMAL
BASOPHILS # BLD: 0.4 % (ref 0–3)
BILIRUB SERPL-MCNC: 0.4 MG/DL (ref 0.2–1)
BILIRUBIN URINE: NEGATIVE
BLOOD, URINE: NEGATIVE
BUN BLDV-MCNC: 22 MG/DL (ref 7–18)
CASTS UA: ABNORMAL /LPF
CHARACTER, URINE: ABNORMAL
CHLORIDE BLD-SCNC: 102 MEQ/L (ref 98–107)
CO2: 26 MEQ/L (ref 21–32)
COLOR: YELLOW
CREAT SERPL-MCNC: 0.8 MG/DL (ref 0.6–1.3)
CRYSTALS, UA: ABNORMAL
EOSINOPHILS RELATIVE PERCENT: 1.3 % (ref 0–4)
EPITHELIAL CELLS, UA: ABNORMAL /HPF
GFR, ESTIMATED: 76 ML/MIN/1.73M2
GLUCOSE BLD-MCNC: 132 MG/DL (ref 74–106)
GLUCOSE, URINE: NEGATIVE MG/DL
HCT VFR BLD CALC: 34.9 % (ref 37–47)
HEMOGLOBIN: 11.7 GM/DL (ref 12–16)
KETONES, URINE: NEGATIVE
LEUKOCYTE ESTERASE, URINE: ABNORMAL
LYMPHOCYTES # BLD: 15.7 % (ref 15–47)
MCH RBC QN AUTO: 31.6 PG (ref 27–31)
MCHC RBC AUTO-ENTMCNC: 33.6 GM/DL (ref 33–37)
MCV RBC AUTO: 94.1 FL (ref 81–99)
MONOCYTES: 5.8 % (ref 0–12)
MUCUS: ABNORMAL
NITRITE, URINE: POSITIVE
PDW BLD-RTO: 12.6 % (ref 11.5–14.5)
PH UA: 6 (ref 5–9)
PLATELET # BLD: 380 THOU/MM3 (ref 130–400)
PMV BLD AUTO: 7.4 FL (ref 7.4–10.4)
POC CALCIUM: 10.1 MG/DL (ref 8.5–10.1)
POTASSIUM SERPL-SCNC: 4.4 MEQ/L (ref 3.5–5.1)
PROTEIN UA: NEGATIVE MG/DL
RBC # BLD: 3.71 MILL/MM3 (ref 4.2–5.4)
RBC UA: ABNORMAL /HPF
REFLEX TO URINE C & S: ABNORMAL
SARS-COV-2, NAAT: DETECTED
SEGS: 76.8 % (ref 43–75)
SODIUM BLD-SCNC: 140 MEQ/L (ref 136–145)
SPECIFIC GRAVITY UA: 1.01 (ref 1–1.03)
TOTAL PROTEIN: 8.2 GM/DL (ref 6.4–8.2)
UROBILINOGEN, URINE: 0.2 EU/DL (ref 0–1)
WBC # BLD: 11.2 THOU/MM3 (ref 4.8–10.8)
WBC UA: ABNORMAL /HPF

## 2021-12-06 PROCEDURE — 6360000004 HC RX CONTRAST MEDICATION: Performed by: EMERGENCY MEDICINE

## 2021-12-06 PROCEDURE — 80053 COMPREHEN METABOLIC PANEL: CPT

## 2021-12-06 PROCEDURE — 87077 CULTURE AEROBIC IDENTIFY: CPT

## 2021-12-06 PROCEDURE — 99284 EMERGENCY DEPT VISIT MOD MDM: CPT

## 2021-12-06 PROCEDURE — 87086 URINE CULTURE/COLONY COUNT: CPT

## 2021-12-06 PROCEDURE — 87186 SC STD MICRODIL/AGAR DIL: CPT

## 2021-12-06 PROCEDURE — 74177 CT ABD & PELVIS W/CONTRAST: CPT

## 2021-12-06 PROCEDURE — 87635 SARS-COV-2 COVID-19 AMP PRB: CPT

## 2021-12-06 PROCEDURE — 85025 COMPLETE CBC W/AUTO DIFF WBC: CPT

## 2021-12-06 PROCEDURE — 81001 URINALYSIS AUTO W/SCOPE: CPT

## 2021-12-06 RX ORDER — HYDROCODONE BITARTRATE AND ACETAMINOPHEN 5; 325 MG/1; MG/1
1 TABLET ORAL EVERY 6 HOURS PRN
Qty: 15 TABLET | Refills: 0 | Status: SHIPPED | OUTPATIENT
Start: 2021-12-06 | End: 2021-12-11

## 2021-12-06 RX ORDER — LEVOFLOXACIN 500 MG/1
500 TABLET, FILM COATED ORAL DAILY
Qty: 7 TABLET | Refills: 0 | Status: SHIPPED | OUTPATIENT
Start: 2021-12-06 | End: 2021-12-13

## 2021-12-06 RX ORDER — LANOLIN ALCOHOL/MO/W.PET/CERES
1000 CREAM (GRAM) TOPICAL DAILY
COMMUNITY

## 2021-12-06 RX ORDER — ONDANSETRON 4 MG/1
4 TABLET, ORALLY DISINTEGRATING ORAL EVERY 8 HOURS PRN
Qty: 20 TABLET | Refills: 0 | Status: SHIPPED | OUTPATIENT
Start: 2021-12-06 | End: 2021-12-13

## 2021-12-06 RX ORDER — BIOTIN 2500 MCG
CAPSULE ORAL
COMMUNITY

## 2021-12-06 RX ADMIN — IOPAMIDOL 100 ML: 755 INJECTION, SOLUTION INTRAVENOUS at 10:44

## 2021-12-06 ASSESSMENT — PAIN DESCRIPTION - LOCATION
LOCATION: ABDOMEN
LOCATION: ABDOMEN

## 2021-12-06 ASSESSMENT — PAIN SCALES - GENERAL
PAINLEVEL_OUTOF10: 5
PAINLEVEL_OUTOF10: 5

## 2021-12-06 ASSESSMENT — PAIN DESCRIPTION - ORIENTATION
ORIENTATION: RIGHT;LOWER
ORIENTATION: RIGHT;LOWER

## 2021-12-06 NOTE — ED NOTES
Pt presents w/ c/o RLQ pain. States that pain started yesterday. Pain is worse w/ movement and cough. She has had a cough for approx 3 weeks. No swelling noted to area. Dr. Yarely Gill at bedside to examine pt.       Arabella Ray RN  12/06/21 2061

## 2021-12-06 NOTE — ED PROVIDER NOTES
[address] [facility]  [facility] [address]  Phone: [address]    Chief Complaint   Patient presents with    Abdominal Pain     RLQ       HPI    Chang Calderón is a 73 y.o. female who presents   above-noted complaint. Patient developed some right sided belly pain. Is been there last day or so. Pain is worse with twisting movement and coughing. She has had a cough about 3 weeks although does not want tested for Covid. Denies high fever chills or vomiting although has lost some weight and does not feel good. She was concerned with her pain. PAST MEDICAL HISTORY    Past Medical History:   Diagnosis Date    Anxiety     Blood transfusion     Diabetes mellitus ([facility] [address]     Diverticulosis of colon     Hyperlipidemia     Hypertension     Localized, primary osteoarthritis of shoulder region 8/14/2019    Nausea & vomiting        SURGICAL HISTORY    Past Surgical History:   Procedure Laterality Date    CARPAL TUNNEL RELEASE Right     CHOLECYSTECTOMY      COLONOSCOPY      ENDOSCOPY, COLON, DIAGNOSTIC      FOOT SURGERY Bilateral 2004    HYSTERECTOMY      JOINT REPLACEMENT Right 2009 2018    knee    LITHOTRIPSY      MECKEL DIVERTICULUM EXCISION      diverticulosis    SHOULDER SURGERY Left 1992       CURRENT MEDICATIONS    Current Outpatient Rx   Medication Sig Dispense Refill    Biotin 2500 MCG CAPS Take by mouth      vitamin B-12 (CYANOCOBALAMIN) 1000 MCG tablet Take 1,000 mcg by mouth daily      ondansetron (ZOFRAN-ODT) 4 MG disintegrating tablet Place 1 tablet under the tongue every 8 hours as needed for Nausea or Vomiting May Sub regular tablet (non-ODT) if insurance does not cover ODT.  20 tablet 0    levoFLOXacin (LEVAQUIN) 500 MG tablet Take 1 tablet by mouth daily for 7 days 7 tablet 0    HYDROcodone-acetaminophen (NORCO) 5-325 MG per tablet Take 1 tablet by mouth every 6 hours as needed for Pain for up to 5 days.  15 tablet 0    levothyroxine (SYNTHROID) 25 MCG tablet Take 25 mcg by mouth Daily      meloxicam (MOBIC) 7.5 MG tablet Take 7.5 mg by mouth daily      metFORMIN (GLUCOPHAGE-XR) 500 MG extended release tablet Take 1,000 mg by mouth 2 times daily      hydroxychloroquine (PLAQUENIL) 200 MG tablet Take 300 mg by mouth daily       tolterodine (DETROL) 1 MG tablet Take 1 mg by mouth 2 times daily       atorvastatin (LIPITOR) 20 MG tablet Take 20 mg by mouth daily   11    TRULICITY 1.5 KW/4.6XC SOPN Inject 1.5 mg into the skin once a week   6    citalopram (CELEXA) 20 MG tablet Take 20 mg by mouth daily       Multiple Vitamins-Minerals (THERAPEUTIC MULTIVITAMIN-MINERALS) tablet Take 1 tablet by mouth daily      lisinopril (PRINIVIL;ZESTRIL) 2.5 MG tablet Take 2.5 mg by mouth daily       Probiotic Product (PROBIOTIC DAILY PO) Take by mouth 2 times daily         ALLERGIES    No Known Allergies    FAMILY HISTORY    Family History   Problem Relation Age of Onset    Cancer Father     Heart Disease Father     Arthritis Father     Arthritis Mother     Stroke Sister        SOCIAL HISTORY    Social History     Socioeconomic History    Marital status:      Spouse name: None    Number of children: None    Years of education: None    Highest education level: None   Occupational History    None   Tobacco Use    Smoking status: Never Smoker    Smokeless tobacco: Never Used   Vaping Use    Vaping Use: Never used   Substance and Sexual Activity    Alcohol use: Yes     Comment: rarely    Drug use: No    Sexual activity: None   Other Topics Concern    None   Social History Narrative    None     Social Determinants of Health     Financial Resource Strain:     Difficulty of Paying Living Expenses: Not on file   Food Insecurity:     Worried About Running Out of Food in the Last Year: Not on file    Hiren of Food in the Last Year: Not on file   Transportation Needs:     Lack of Transportation (Medical): Not on file    Lack of Transportation (Non-Medical): Not on file   Physical Activity:     Days of Exercise per Week: Not on file    Minutes of Exercise per Session: Not on file   Stress:     Feeling of Stress : Not on file   Social Connections:     Frequency of Communication with Friends and Family: Not on file    Frequency of Social Gatherings with Friends and Family: Not on file    Attends Jain Services: Not on file    Active Member of [address] [place] [address] or Organizations: Not on file    Attends Club or Organization Meetings: Not on file    Marital Status: Not on file   Intimate Partner Violence:     Fear of Current or Ex-Partner: Not on file    Emotionally Abused: Not on file    Physically Abused: Not on file    Sexually Abused: Not on file   Housing Stability:     Unable to Pay for Housing in the Last Year: Not on file    Number of [place] in the Last Year: Not on file    Unstable Housing in the Last Year: Not on file       REVIEW OF SYSTEMS    Positive for abdominal pain. No vomiting but decreased appetite. No urinary symptoms. No melena. All systems negative except as marked. PHYSICAL EXAM    VITAL SIGNS: /80   Pulse 105   Temp 97.8 °F (36.6 °C) (Temporal)   Resp 15   Ht 4' 11\" (1.499 m)   Wt 116 lb (52.6 kg)   SpO2 97%   BMI 23.43 kg/m²    Constitutional:  Alert not toxic or ill,   HENT: COVID mask protection in place normocephalic, Atraumatic, mask lowered to assess  Bilateral external ears normal, Oropharynx moist, No oral exudates, Nose normal.  Cervical Spine: Normal range of motion,  No stridor. No tenderness, Supple,  Eyes:  No discharge or  Swelling,Conjunctiva normal, PERRL, EOMI,  Respiratory: No respiratory distress, Normal breath sounds,  No wheezing, No chest tenderness. Cardiovascular:  Normal heart rate, Normal rhythm, No murmurs, No rubs, No gallops.    GI: Mild right lower quadrant reproducible pain, Bowel sounds normal, Soft, No masses, No pulsatile masses. No peritoneal tenderness  Musculoskeletal:  Intact distal pulses, No edema, No tenderness, No cyanosis, No clubbing. Good range of motion in all major joints. No tenderness to palpation or major deformities noted. Back:No tenderness. Integument:  Warm, Dry, No erythema, No rash (on exposed areas)   Lymphatic:  No lymphadenopathy noted. Neurologic:  Alert & oriented x 3, Normal motor function, Normal sensory function, No focal deficits noted. Psychiatric:  Affect normal, Judgment normal, Mood normal.     EKG                       RADIOLOGY    CT ABDOMEN PELVIS W IV CONTRAST Additional Contrast? None   Final Result   1. A few scattered areas of nodular consolidation is seen in the lower lungs. Findings can relate to an infectious etiology such as COVID 19 infection amongst other possibilities. Clinical correlation is recommended. A repeat CT chest without contrast is    recommended in 3 months after the acute symptoms have resolved. The pertinent finding(s) was called to Dr. Violeta Vargas at 1108 hours on 12/6/2021 by Dr. Mynor Guan. Verbal acknowledgment and readback was given. 2. No CT evidence of bowel obstruction or acute inflammatory bowel process. 3. Grade 1 spondylolisthesis of L4.      4. Other incidental findings as described above. **This report has been created using voice recognition software. It may contain minor errors which are inherent in voice recognition technology. **      Final report electronically signed by Dr Nura Shaikh on 12/6/2021 11:11 AM          PROCEDURES    none      CONSULTS:  None      CRITICAL CARE:  None    SCREENINGS  /80   Pulse 105   Temp 97.8 °F (36.6 °C) (Temporal)   Resp 15   Ht 4' 11\" (1.499 m)   Wt 116 lb (52.6 kg)   SpO2 97%   BMI 23.43 kg/m²        Screening For Hypertension and Follow-up (#317)   previously diagnosed with hypertension and not applicable for screen      Screening For Tobacco Use and Cessation Intervention (#226):   reports that she has never smoked. She has never used smokeless tobacco.  Non-smoker not applicable for screen       ED COURSE & MEDICAL DECISION MAKING    Pertinent Labs & Imaging studies reviewed. (See chart for details)  Patient has abdominal pain and discomfort. Exam is benign although there is some tenderness in right lower quadrant. Exam routine labs. Urinalysis CAT scan of the area. REASSESSMENT  12:02 PM  Patient rechecked and updated on lab/xray status, progress and results. .  Patient was reassessed and condition was unchanged after tx. No further needs at this time. Peritonitis on clinical exam at this time. White count slightly elevated unclear significance. She has had cough and congestion although she states has been going on for about 3 weeks. Initially offered her Covid test and she did not want it at this time. My suspicion is she could have Covid and having a positive test is actually going to be just relative to her recent infection. Does have a little bit of cloudiness in her urine which may be causing some of her abdominal symptoms. Her CAT scan shows some lung findings and in discussion with radiology could represent some Covid. She has a normal oxygen level. Labs look well we will send her home on antibiotics. She did now want a Covid test and we will obtain that.      12:15 PM EST  Covid test is positive at this time. I do feel this is delayed Covid and she is convalescing from that. She is not hypoxic. FINAL IMPRESSION    1. Abdominal pain, right lower quadrant    2. Acute cystitis without hematuria    3.  Upper respiratory tract infection, unspecified type         PATIENT REFERRED TO:  KARLA Nieto - CNP  [facility] [address] [place] [address] [address] [facility]  [phone]    Call   For evaluation      DISCHARGE MEDICATIONS:  New Prescriptions    HYDROCODONE-ACETAMINOPHEN (NORCO) 5-325 MG PER TABLET    Take 1 tablet by mouth every 6 hours as needed for Pain for up to 5 days. LEVOFLOXACIN (LEVAQUIN) 500 MG TABLET    Take 1 tablet by mouth daily for 7 days    ONDANSETRON (ZOFRAN-ODT) 4 MG DISINTEGRATING TABLET    Place 1 tablet under the tongue every 8 hours as needed for Nausea or Vomiting May Sub regular tablet (non-ODT) if insurance does not cover ODT.            Em Charles MD  12/06/21 5368

## 2021-12-07 ENCOUNTER — CARE COORDINATION (OUTPATIENT)
Dept: CARE COORDINATION | Age: 71
End: 2021-12-07

## 2021-12-08 ENCOUNTER — CARE COORDINATION (OUTPATIENT)
Dept: CARE COORDINATION | Age: 71
End: 2021-12-08

## 2021-12-08 NOTE — CARE COORDINATION
Second attempt. Left message for pt requesting return call to [facility] regarding ER / Covid follow up.

## 2021-12-09 LAB
ORGANISM: ABNORMAL
URINE CULTURE REFLEX: ABNORMAL

## 2021-12-15 ENCOUNTER — HOSPITAL ENCOUNTER (EMERGENCY)
Age: 71
Discharge: HOME | End: 2021-12-15
Attending: EMERGENCY MEDICINE
Payer: MEDICARE

## 2021-12-15 ENCOUNTER — APPOINTMENT (OUTPATIENT)
Dept: CT IMAGING | Age: 71
End: 2021-12-15
Payer: MEDICARE

## 2021-12-15 VITALS
DIASTOLIC BLOOD PRESSURE: 47 MMHG | OXYGEN SATURATION: 99 % | TEMPERATURE: 97 F | WEIGHT: 117 LBS | HEIGHT: 59 IN | BODY MASS INDEX: 23.59 KG/M2 | RESPIRATION RATE: 16 BRPM | SYSTOLIC BLOOD PRESSURE: 100 MMHG | HEART RATE: 86 BPM

## 2021-12-15 DIAGNOSIS — U07.1 COVID-19 VIRUS INFECTION: Primary | ICD-10-CM

## 2021-12-15 DIAGNOSIS — R06.02 SHORTNESS OF BREATH: ICD-10-CM

## 2021-12-15 LAB
ANION GAP: 11 MEQ/L (ref 8–16)
BASOPHILS # BLD: 0.4 % (ref 0–3)
BUN BLDV-MCNC: 18 MG/DL (ref 7–18)
CHLORIDE BLD-SCNC: 102 MEQ/L (ref 98–107)
CO2: 26 MEQ/L (ref 21–32)
CREAT SERPL-MCNC: 0.8 MG/DL (ref 0.6–1.3)
EOSINOPHILS RELATIVE PERCENT: 1.2 % (ref 0–4)
GFR, ESTIMATED: 76 ML/MIN/1.73M2
GLUCOSE BLD-MCNC: 99 MG/DL (ref 74–106)
HCT VFR BLD CALC: 33.9 % (ref 37–47)
HEMOGLOBIN: 11.3 GM/DL (ref 12–16)
LYMPHOCYTES # BLD: 24.6 % (ref 15–47)
MCH RBC QN AUTO: 30.9 PG (ref 27–31)
MCHC RBC AUTO-ENTMCNC: 33.2 GM/DL (ref 33–37)
MCV RBC AUTO: 93.1 FL (ref 81–99)
MONOCYTES: 5.6 % (ref 0–12)
PDW BLD-RTO: 12.8 % (ref 11.5–14.5)
PLATELET # BLD: 472 THOU/MM3 (ref 130–400)
PMV BLD AUTO: 7.2 FL (ref 7.4–10.4)
POC CALCIUM: 9.3 MG/DL (ref 8.5–10.1)
POTASSIUM SERPL-SCNC: 4.1 MEQ/L (ref 3.5–5.1)
RBC # BLD: 3.64 MILL/MM3 (ref 4.2–5.4)
SEGS: 68.2 % (ref 43–75)
SODIUM BLD-SCNC: 139 MEQ/L (ref 136–145)
TROPONIN I: < 0.017 NG/ML (ref 0.02–0.06)
WBC # BLD: 5.7 THOU/MM3 (ref 4.8–10.8)

## 2021-12-15 PROCEDURE — 84484 ASSAY OF TROPONIN QUANT: CPT

## 2021-12-15 PROCEDURE — 71275 CT ANGIOGRAPHY CHEST: CPT

## 2021-12-15 PROCEDURE — 85025 COMPLETE CBC W/AUTO DIFF WBC: CPT

## 2021-12-15 PROCEDURE — 99283 EMERGENCY DEPT VISIT LOW MDM: CPT

## 2021-12-15 PROCEDURE — 2580000003 HC RX 258: Performed by: EMERGENCY MEDICINE

## 2021-12-15 PROCEDURE — 6360000004 HC RX CONTRAST MEDICATION: Performed by: EMERGENCY MEDICINE

## 2021-12-15 PROCEDURE — 96374 THER/PROPH/DIAG INJ IV PUSH: CPT

## 2021-12-15 PROCEDURE — 6360000002 HC RX W HCPCS: Performed by: EMERGENCY MEDICINE

## 2021-12-15 PROCEDURE — 80048 BASIC METABOLIC PNL TOTAL CA: CPT

## 2021-12-15 RX ORDER — 0.9 % SODIUM CHLORIDE 0.9 %
200 INTRAVENOUS SOLUTION INTRAVENOUS ONCE
Status: COMPLETED | OUTPATIENT
Start: 2021-12-15 | End: 2021-12-15

## 2021-12-15 RX ORDER — DEXAMETHASONE SODIUM PHOSPHATE 4 MG/ML
4 INJECTION, SOLUTION INTRA-ARTICULAR; INTRALESIONAL; INTRAMUSCULAR; INTRAVENOUS; SOFT TISSUE ONCE
Status: COMPLETED | OUTPATIENT
Start: 2021-12-15 | End: 2021-12-15

## 2021-12-15 RX ORDER — PREDNISONE 20 MG/1
20 TABLET ORAL 2 TIMES DAILY
Qty: 10 TABLET | Refills: 0 | Status: SHIPPED | OUTPATIENT
Start: 2021-12-15 | End: 2021-12-20

## 2021-12-15 RX ADMIN — IOPAMIDOL 100 ML: 755 INJECTION, SOLUTION INTRAVENOUS at 11:16

## 2021-12-15 RX ADMIN — DEXAMETHASONE SODIUM PHOSPHATE 4 MG: 4 INJECTION INTRA-ARTICULAR; INTRALESIONAL; INTRAMUSCULAR; INTRAVENOUS; SOFT TISSUE at 11:56

## 2021-12-15 RX ADMIN — SODIUM CHLORIDE 200 ML: 9 INJECTION, SOLUTION INTRAVENOUS at 11:57

## 2021-12-15 ASSESSMENT — ENCOUNTER SYMPTOMS
SORE THROAT: 0
ABDOMINAL PAIN: 0
VOMITING: 0
BLOOD IN STOOL: 0
DIARRHEA: 0
COUGH: 1
EYE PAIN: 0
EYE DISCHARGE: 0
WHEEZING: 0
SHORTNESS OF BREATH: 1

## 2021-12-15 ASSESSMENT — PAIN SCALES - GENERAL: PAINLEVEL_OUTOF10: 4

## 2021-12-15 ASSESSMENT — PAIN DESCRIPTION - LOCATION: LOCATION: HEAD

## 2021-12-15 NOTE — ED NOTES
Pt resting in room, resp even and unlabored. Pt denies any complaints at this time.      Princess Guerra RN  12/15/21 5215

## 2021-12-15 NOTE — ED NOTES
Pt complains of feeling SOB for the last couple days. Pt was diagnosed with COVID 12/6/21. Pt complains of a cough and fatigue. Pt called her doctor this am and was told to come due to a possible blood clot. Pt denies chest pain or pressure. Pt alert, resp even and unlabored, skin pink, warm and dry.      Radha Rincon RN  12/15/21 7653

## 2021-12-15 NOTE — ED NOTES
Pt resting, resp even and unlabored, skin pink, warm and dry. Pt denies any complaints. IV discontinued. Pt given discharge instructions and verbalizes understanding, pt released.      Daniella Avila RN  12/15/21 5029

## 2021-12-15 NOTE — ED PROVIDER NOTES
[address] [facility] [address]  [address] [facility] [address]  Phone: [phone]    eMERGENCY dEPARTMENT eNCOUnter           [address] [facility]       Chief Complaint   Patient presents with    Shortness of Breath       Nurses Notes reviewed and I agree except as noted in the HPI. HISTORY OF PRESENT ILLNESS    Andrea Paz is a 73 y.o. female who presented via private vehicle with the above-mentioned complaints. She was diagnosed with Covid 19 virus infection 9 days ago. She continued to have shortness of breath. She describes her symptoms as mild, worse with exertion and in supine position. She denies chest pain. She has no fever or chills. She has minimal nonproductive cough. She also is complaining of mild fatigue and mild body ache. She was advised by her family doctor to come to the ED to rule out PE. Patient has no prior history of DVT or PE.    REVIEW OF SYSTEMS     Review of Systems   Constitutional: Positive for fatigue. Negative for chills and fever. HENT: Negative for sore throat. Eyes: Negative for pain and discharge. Respiratory: Positive for cough and shortness of breath. Negative for wheezing. Cardiovascular: Negative for chest pain and palpitations. Gastrointestinal: Negative for abdominal pain, blood in stool, diarrhea and vomiting. Genitourinary: Negative for dysuria and hematuria. Musculoskeletal: Negative for neck pain and neck stiffness. Neurological: Negative for seizures, syncope and headaches. Hematological: Negative for adenopathy. Psychiatric/Behavioral: Negative for confusion. PAST MEDICAL HISTORY    has a past medical history of Anxiety, Blood transfusion, Diabetes mellitus ([address] Diverticulosis of colon, Hyperlipidemia, Hypertension, Localized, primary osteoarthritis of shoulder region, and Nausea & vomiting.     SURGICAL HISTORY      has a past surgical history that includes joint replacement (Right, 2009 2018); Cholecystectomy; Foot surgery (Bilateral, ); shoulder surgery (Left, ); Lithotripsy; Hysterectomy; Colonoscopy; Endoscopy, colon, diagnostic; Meckel's diverticulum excision; and Carpal tunnel release (Right). CURRENT MEDICATIONS       Previous Medications    ATORVASTATIN (LIPITOR) 20 MG TABLET    Take 20 mg by mouth daily     BIOTIN 2500 MCG CAPS    Take by mouth    CITALOPRAM (CELEXA) 20 MG TABLET    Take 20 mg by mouth daily     HYDROXYCHLOROQUINE (PLAQUENIL) 200 MG TABLET    Take 300 mg by mouth daily     LEVOTHYROXINE (SYNTHROID) 25 MCG TABLET    Take 25 mcg by mouth Daily    LISINOPRIL (PRINIVIL;ZESTRIL) 2.5 MG TABLET    Take 2.5 mg by mouth daily     MELOXICAM (MOBIC) 7.5 MG TABLET    Take 7.5 mg by mouth daily    METFORMIN (GLUCOPHAGE-XR) 500 MG EXTENDED RELEASE TABLET    Take 1,000 mg by mouth 2 times daily    MULTIPLE VITAMINS-MINERALS (THERAPEUTIC MULTIVITAMIN-MINERALS) TABLET    Take 1 tablet by mouth daily    PROBIOTIC PRODUCT (PROBIOTIC DAILY PO)    Take by mouth 2 times daily    TOLTERODINE (DETROL) 1 MG TABLET    Take 1 mg by mouth 2 times daily     TRULICITY 1.5 XA/1.5SQ SOPN    Inject 1.5 mg into the skin once a week     VITAMIN B-12 (CYANOCOBALAMIN) 1000 MCG TABLET    Take 1,000 mcg by mouth daily       ALLERGIES     has No Known Allergies. FAMILY HISTORY     She indicated that her mother is alive. She indicated that her father is . She indicated that her sister is alive. She indicated that only one of her two brothers is alive. family history includes Arthritis in her father and mother; Cancer in her father; Heart Disease in her father; Stroke in her sister. SOCIAL HISTORY      reports that she has never smoked. She has never used smokeless tobacco. She reports current alcohol use. She reports that she does not use drugs. PHYSICAL EXAM     INITIAL VITALS:  height is 4' 11\" (1.499 m) and weight is 117 lb (53.1 kg). Her tympanic temperature is 97 °F (36.1 °C).  Her blood pressure is 137/87 and her pulse is 102. Her respiration is 20 and oxygen saturation is 100%. Physical Exam  Vitals and nursing note reviewed. Constitutional:       General: She is not in acute distress. Appearance: She is well-developed. HENT:      Head: Atraumatic. Eyes:      Conjunctiva/sclera: Conjunctivae normal.      Pupils: Pupils are equal, round, and reactive to light. Neck:      Thyroid: No thyromegaly. Vascular: No JVD. Trachea: No tracheal deviation. Cardiovascular:      Rate and Rhythm: Normal rate and regular rhythm. Heart sounds: No murmur heard. No friction rub. No gallop. Pulmonary:      Effort: Pulmonary effort is normal.      Breath sounds: Normal breath sounds. Abdominal:      General: Bowel sounds are normal.      Palpations: Abdomen is soft. Tenderness: There is no abdominal tenderness. Musculoskeletal:         General: No swelling or tenderness. Cervical back: Neck supple. Right lower leg: No edema. Left lower leg: No edema. Neurological:      Mental Status: She is alert. DIFFERENTIAL DIAGNOSIS:       DIAGNOSTIC RESULTS       RADIOLOGY:   PROCEDURE: CTA CHEST W WO CONTRAST     CLINICAL INFORMATION: Shortness of breath, rule out PE.     COMPARISON: CT scan of the chest dated 21 May 2009.     TECHNIQUE: 3 mm axial images were obtained through the chest after the administration of IV contrast.  A non-contrast localizer was obtained. 3D reconstructions were performed on the scanner to include MIP coronal and sagittal images through the chest.   Isovue was the intravenous contrast utilized.     All CT scans at this facility use dose modulation, iterative reconstruction, and/or weight-based dosing when appropriate to reduce radiation dose to as low as reasonably achievable.     FINDINGS:        There is adequate opacification of the pulmonary arterial system. No pulmonary emboli are present.   There is atherosclerotic calcification in the aortic arch.        The heart size is normal. There is no pericardial or pleural effusion. There is no mediastinal, axillary or hilar adenopathy.     There are areas of groundglass density in both lung fields. These findings may represent inflammatory process, possibly Covid 19 infection. Please correlate clinically. .  There are no effusions. No suspicious osseous lesions are present. The patient is   status post cholecystectomy.      IMPRESSION:     1. No evidence of pulmonary embolism. 2. Areas of groundglass attenuation throughout both lung fields consistent with inflammatory process presumably Covid 19 infection. Please correlate clinically. 3. Atherosclerotic calcification in the aortic arch. 4. Status post cholecystectomy. .              **This report has been created using voice recognition software. It may contain minor errors which are inherent in voice recognition technology. **     Final report electronically signed by DR Josh Segura on 12/15/2021 11:42 AM  LABS:   Labs Reviewed   CBC WITH AUTO DIFFERENTIAL - Abnormal; Notable for the following components:       Result Value    RBC 3.64 (*)     Hemoglobin 11.3 (*)     Hematocrit 33.9 (*)     Platelets 607 (*)     MPV 7.2 (*)     All other components within normal limits   GLOMERULAR FILTRATION RATE, ESTIMATED - Abnormal; Notable for the following components:    GFR, Estimated 76 (*)     All other components within normal limits   TROPONIN   BASIC METABOLIC PANEL   ANION GAP   Laboratory studies were unremarkable    EMERGENCY DEPARTMENT COURSE:   Vitals:    Vitals:    12/15/21 1025   BP: 137/87   Pulse: 102   Resp: 20   Temp: 97 °F (36.1 °C)   TempSrc: Tympanic   SpO2: 100%   Weight: 117 lb (53.1 kg)   Height: 4' 11\" (1.499 m)   Patient received normal saline 200 mL IV bolus and Decadron 4 mg IV. She remained hemodynamically stable. Her oxygen saturation remained 98% to 100% on room air.   Reevaluation at 12:20 PM:  He was resting comfortably not obvious distress. I discussed with her diagnosis and treatment plan      FINAL IMPRESSION      1. COVID-19 virus infection    2. Shortness of breath          DISPOSITION/PLAN   Discharged home in good condition.     PATIENT REFERRED TO:  KARLA Rodriguez - CNP  [address] [place] [address] [address] [facility] [phone]    In 2 days        DISCHARGE MEDICATIONS:  New Prescriptions    PREDNISONE (DELTASONE) 20 MG TABLET    Take 1 tablet by mouth 2 times daily for 5 days       (Please note that portions of this note were completed with a voice recognition program.  Efforts were made to edit the dictations but occasionally words are mis-transcribed.)    MD Goran Morse MD  12/15/21 6357

## 2021-12-16 ENCOUNTER — CARE COORDINATION (OUTPATIENT)
Dept: CARE COORDINATION | Age: 71
End: 2021-12-16

## 2021-12-17 NOTE — CARE COORDINATION
Patient contacted regarding COVID-19 diagnosis. Discussed COVID-19 related testing which was available at this time. Test results were positive. Patient informed of results, if available? Yes. Ambulatory Care Manager contacted the patient by telephone to perform post discharge assessment. Call within 2 business days of discharge: Yes. Verified name and  with patient as identifiers. Provided introduction to self, and explanation of the CTN/ACM role, and reason for call due to risk factors for infection and/or exposure to COVID-19. Symptoms reviewed with patient who verbalized the following symptoms: states Covid sx's resolved. Due to no new or worsening symptoms encounter was not routed to provider for escalation. Discussed follow-up appointments. If no appointment was previously scheduled, appointment scheduling offered: No.  [address] follow up appointment(s): No future appointments. Non-[facility] follow up appointment(s):     Non-face-to-face services provided:  Obtained and reviewed discharge summary and/or continuity of care documents     Advance Care Planning:   Does patient have an Advance Directive:  reviewed and current. Educated patient about risk for severe COVID-19 due to risk factors according to CDC guidelines. ACM reviewed discharge instructions, medical action plan and red flag symptoms with the patient who verbalized understanding. Discussed COVID vaccination status: No. Education provided on COVID-19 vaccination as appropriate. Discussed exposure protocols and quarantine with CDC Guidelines. Patient was given an opportunity to verbalize any questions and concerns and agrees to contact ACM or health care provider for questions related to their healthcare. Reviewed and educated patient on any new and changed medications related to discharge diagnosis     Was patient discharged with a pulse oximeter?  No Discussed and confirmed pulse oximeter discharge instructions and when to notify provider or seek emergency care. ACM provided contact information. No further follow-up call identified based on severity of symptoms and risk factors.

## 2022-02-27 ENCOUNTER — HOSPITAL ENCOUNTER (EMERGENCY)
Age: 72
Discharge: HOME | End: 2022-02-27
Attending: EMERGENCY MEDICINE
Payer: MEDICARE

## 2022-02-27 VITALS
HEIGHT: 59 IN | BODY MASS INDEX: 23.39 KG/M2 | RESPIRATION RATE: 16 BRPM | SYSTOLIC BLOOD PRESSURE: 123 MMHG | TEMPERATURE: 97.1 F | HEART RATE: 100 BPM | DIASTOLIC BLOOD PRESSURE: 57 MMHG | WEIGHT: 116 LBS | OXYGEN SATURATION: 100 %

## 2022-02-27 DIAGNOSIS — M25.512 ACUTE PAIN OF LEFT SHOULDER: ICD-10-CM

## 2022-02-27 DIAGNOSIS — S46.002A INJURY OF LEFT ROTATOR CUFF, INITIAL ENCOUNTER: Primary | ICD-10-CM

## 2022-02-27 PROCEDURE — 6370000000 HC RX 637 (ALT 250 FOR IP): Performed by: EMERGENCY MEDICINE

## 2022-02-27 PROCEDURE — 99284 EMERGENCY DEPT VISIT MOD MDM: CPT

## 2022-02-27 RX ORDER — IBUPROFEN 200 MG
400 TABLET ORAL ONCE
Status: COMPLETED | OUTPATIENT
Start: 2022-02-27 | End: 2022-02-27

## 2022-02-27 RX ORDER — HYDROCODONE BITARTRATE AND ACETAMINOPHEN 5; 325 MG/1; MG/1
1 TABLET ORAL EVERY 4 HOURS PRN
Qty: 10 TABLET | Refills: 0 | Status: SHIPPED | OUTPATIENT
Start: 2022-02-27 | End: 2022-03-02

## 2022-02-27 RX ADMIN — IBUPROFEN 400 MG: 200 TABLET, FILM COATED ORAL at 17:02

## 2022-02-27 ASSESSMENT — PAIN SCALES - GENERAL: PAINLEVEL_OUTOF10: 4

## 2022-02-27 ASSESSMENT — PAIN DESCRIPTION - PROGRESSION: CLINICAL_PROGRESSION: GRADUALLY WORSENING

## 2022-02-27 ASSESSMENT — PAIN - FUNCTIONAL ASSESSMENT: PAIN_FUNCTIONAL_ASSESSMENT: 0-10

## 2022-02-27 ASSESSMENT — PAIN DESCRIPTION - PAIN TYPE: TYPE: ACUTE PAIN

## 2022-02-27 ASSESSMENT — PAIN DESCRIPTION - FREQUENCY: FREQUENCY: CONTINUOUS

## 2022-02-27 ASSESSMENT — PAIN DESCRIPTION - LOCATION: LOCATION: SHOULDER

## 2022-02-27 ASSESSMENT — PAIN DESCRIPTION - ORIENTATION: ORIENTATION: LEFT

## 2022-02-27 ASSESSMENT — PAIN DESCRIPTION - ONSET: ONSET: GRADUAL

## 2022-02-27 NOTE — ED TRIAGE NOTES
Arrives to [facility] for the evaluation of left shoulder pain without injury. Has a 3 month history of left shoulder problem. States started worsening today. Took a Vicodin at home today around 1330 but not helping with the pain. Pain at rest is 4/10, with movement 8/10. Is now noticing numbness and tingling in fingers as well. Is also becoming harder to grab items but can make a fist.  Left radial pulse palpable. VSS. Afebrile. Sitting in chair at this time. Waiting provider to assess for orders.

## 2022-02-27 NOTE — ED PROVIDER NOTES
[address]  [address] [address]  Phone: [phone]    eMERGENCY dEPARTMENT eNCOUnter           [address] [facility]       Chief Complaint   Patient presents with    Shoulder Pain     Left- Causing numbness and tingling in fingers, weak        Nurses Notes reviewed and I agree except as noted in the HPI. HISTORY OF PRESENT ILLNESS    Ashley Brooks is a 73 y.o. female who presented via private vehicle with the above mentioned complaints. She had 3 months history of left shoulder problem. She stated that her pain has been worse over the last 2 days. She denies fall or injury. She describes her pain as sharp severe pain which is worse with rotation and raising arm overhead. Pain radiates down her left arm, she had occasional tingling. Denies focal weakness. Denies fever or chills. She denies chest pain or shortness of breath. She took one leftover Vicodin which helped slightly. REVIEW OF SYSTEMS     None except as mentioned above    PAST MEDICAL HISTORY    has a past medical history of Anxiety, Blood transfusion, Diabetes mellitus ([address] Diverticulosis of colon, Hyperlipidemia, Hypertension, Localized, primary osteoarthritis of shoulder region, and Nausea & vomiting. SURGICAL HISTORY      has a past surgical history that includes joint replacement (Right, 2009 2018); Cholecystectomy; Foot surgery (Bilateral, 2004); shoulder surgery (Left, 1992); Lithotripsy; Hysterectomy; Colonoscopy; Endoscopy, colon, diagnostic; Meckel's diverticulum excision; and Carpal tunnel release (Right).     CURRENT MEDICATIONS       Previous Medications    ATORVASTATIN (LIPITOR) 20 MG TABLET    Take 20 mg by mouth daily     BIOTIN 2500 MCG CAPS    Take by mouth    CITALOPRAM (CELEXA) 20 MG TABLET    Take 20 mg by mouth daily     HYDROXYCHLOROQUINE (PLAQUENIL) 200 MG TABLET    Take 300 mg by mouth daily     LEVOTHYROXINE (SYNTHROID) 25 MCG TABLET    Take 25 mcg by mouth Daily LISINOPRIL (PRINIVIL;ZESTRIL) 2.5 MG TABLET    Take 2.5 mg by mouth daily     MELOXICAM (MOBIC) 7.5 MG TABLET    Take 7.5 mg by mouth daily    METFORMIN (GLUCOPHAGE-XR) 500 MG EXTENDED RELEASE TABLET    Take 1,000 mg by mouth 2 times daily    MULTIPLE VITAMINS-MINERALS (THERAPEUTIC MULTIVITAMIN-MINERALS) TABLET    Take 1 tablet by mouth daily    PROBIOTIC PRODUCT (PROBIOTIC DAILY PO)    Take by mouth 2 times daily    TOLTERODINE (DETROL) 1 MG TABLET    Take 1 mg by mouth 2 times daily     TRULICITY 1.5 TR/6.8IQ SOPN    Inject 1.5 mg into the skin once a week     VITAMIN B-12 (CYANOCOBALAMIN) 1000 MCG TABLET    Take 1,000 mcg by mouth daily       ALLERGIES     has No Known Allergies. FAMILY HISTORY     She indicated that her mother is alive. She indicated that her father is . She indicated that her sister is alive. She indicated that only one of her two brothers is alive. family history includes Arthritis in her father and mother; Cancer in her father; Heart Disease in her father; Stroke in her sister. SOCIAL HISTORY      reports that she has never smoked. She has never used smokeless tobacco. She reports current alcohol use. She reports that she does not use drugs. PHYSICAL EXAM     INITIAL VITALS:  height is 4' 11\" (1.499 m) and weight is 116 lb (52.6 kg). Her temporal temperature is 97.1 °F (36.2 °C). Her blood pressure is 123/57 (abnormal) and her pulse is 100. Her respiration is 16 and oxygen saturation is 100%. Physical Exam  Constitutional:       General: She is not in acute distress. Appearance: Normal appearance. She is not ill-appearing. HENT:      Head: Atraumatic. Cardiovascular:      Rate and Rhythm: Normal rate and regular rhythm. Pulses: Normal pulses. Musculoskeletal:      Cervical back: Neck supple. No tenderness. Comments: Inspection of the left shoulder showed normal contour. There is no evidence of abnormality.   Palpation of the left shoulder showed tenderness palpation of the anterior and lateral joint lines. Pain is worse with external rotation and arm elevation. She has normal neurovascular semination of the left upper extremity. Neurological:      Mental Status: She is alert. DIFFERENTIAL DIAGNOSIS:       DIAGNOSTIC RESULTS       LABS:   Labs Reviewed - No data to display    EMERGENCY DEPARTMENT COURSE:   Vitals:    Vitals:    02/27/22 1636   BP: (!) 123/57   Pulse: 100   Resp: 16   Temp: 97.1 °F (36.2 °C)   TempSrc: Temporal   SpO2: 100%   Weight: 116 lb (52.6 kg)   Height: 4' 11\" (1.499 m)     Received sling and ibuprofen. I discussed diagnosis and treatment plan with her. She demonstrated understanding. FINAL IMPRESSION      1. Injury of left rotator cuff, initial encounter    2. Acute pain of left shoulder          DISPOSITION/PLAN   Discharged home in good condition. PATIENT REFERRED TO:  KARLA Barclay - [facility]  [address] [id number] [phone]    In 2 days        DISCHARGE MEDICATIONS:  New Prescriptions    HYDROCODONE-ACETAMINOPHEN (NORCO) 5-325 MG PER TABLET    Take 1 tablet by mouth every 4 hours as needed for Pain for up to 3 days. Intended supply: 3 days.  Take lowest dose possible to manage pain       (Please note that portions of this note were completed with a voice recognition program.  Efforts were made to edit the dictations but occasionally words are mis-transcribed.)    MD Myriam Zarate MD  02/27/22 2460

## 2022-03-11 ENCOUNTER — HOSPITAL ENCOUNTER (OUTPATIENT)
Dept: CT IMAGING | Age: 72
Discharge: HOME | End: 2022-03-11
Payer: MEDICARE

## 2022-03-11 DIAGNOSIS — R91.1 LUNG NODULE: ICD-10-CM

## 2022-03-11 PROCEDURE — 71250 CT THORAX DX C-: CPT

## 2022-04-01 ENCOUNTER — HOSPITAL ENCOUNTER (OUTPATIENT)
Dept: MAMMOGRAPHY | Age: 72
Discharge: HOME | End: 2022-04-01
Payer: MEDICARE

## 2022-04-01 DIAGNOSIS — Z12.39 SCREENING BREAST EXAMINATION: ICD-10-CM

## 2022-04-01 PROCEDURE — 77063 BREAST TOMOSYNTHESIS BI: CPT

## 2022-06-08 ENCOUNTER — NURSE ONLY (OUTPATIENT)
Dept: LAB | Age: 72
End: 2022-06-08

## 2022-06-08 LAB
BASOPHILS # BLD: 0.2 %
BASOPHILS ABSOLUTE: 0 THOU/MM3 (ref 0–0.1)
EOSINOPHIL # BLD: 1.3 %
EOSINOPHILS ABSOLUTE: 0.1 THOU/MM3 (ref 0–0.4)
ERYTHROCYTE [DISTWIDTH] IN BLOOD BY AUTOMATED COUNT: 13.8 % (ref 11.5–14.5)
ERYTHROCYTE [DISTWIDTH] IN BLOOD BY AUTOMATED COUNT: 49.1 FL (ref 35–45)
HCT VFR BLD CALC: 37.1 % (ref 37–47)
HEMOGLOBIN: 11.7 GM/DL (ref 12–16)
IMMATURE GRANS (ABS): 0 THOU/MM3 (ref 0–0.07)
IMMATURE GRANULOCYTES: 0 %
LYMPHOCYTES # BLD: 27.6 %
LYMPHOCYTES ABSOLUTE: 1.3 THOU/MM3 (ref 1–4.8)
MCH RBC QN AUTO: 30.7 PG (ref 26–33)
MCHC RBC AUTO-ENTMCNC: 31.5 GM/DL (ref 32.2–35.5)
MCV RBC AUTO: 97.4 FL (ref 81–99)
MONOCYTES # BLD: 5 %
MONOCYTES ABSOLUTE: 0.2 THOU/MM3 (ref 0.4–1.3)
NUCLEATED RED BLOOD CELLS: 0 /100 WBC
PLATELET # BLD: 285 THOU/MM3 (ref 130–400)
PMV BLD AUTO: 10.5 FL (ref 9.4–12.4)
RBC # BLD: 3.81 MILL/MM3 (ref 4.2–5.4)
SEG NEUTROPHILS: 65.9 %
SEGMENTED NEUTROPHILS ABSOLUTE COUNT: 3.2 THOU/MM3 (ref 1.8–7.7)
WBC # BLD: 4.8 THOU/MM3 (ref 4.8–10.8)

## 2022-06-09 LAB
ALT SERPL-CCNC: 17 U/L (ref 11–66)
CREAT SERPL-MCNC: 0.7 MG/DL (ref 0.4–1.2)
GFR SERPL CREATININE-BSD FRML MDRD: 83 ML/MIN/1.73M2
SEDIMENTATION RATE, ERYTHROCYTE: 21 MM/HR (ref 0–20)

## 2022-12-04 ENCOUNTER — HOSPITAL ENCOUNTER (INPATIENT)
Age: 72
LOS: 5 days | Discharge: HOME | End: 2022-12-10
Attending: FAMILY MEDICINE | Admitting: SURGERY
Payer: MEDICARE

## 2022-12-04 ENCOUNTER — APPOINTMENT (OUTPATIENT)
Dept: CT IMAGING | Age: 72
End: 2022-12-04
Payer: MEDICARE

## 2022-12-04 DIAGNOSIS — K56.600 PARTIAL SMALL BOWEL OBSTRUCTION (HCC): ICD-10-CM

## 2022-12-04 DIAGNOSIS — K56.609: ICD-10-CM

## 2022-12-04 DIAGNOSIS — K56.601 COMPLETE INTESTINAL OBSTRUCTION, UNSPECIFIED CAUSE (HCC): Primary | ICD-10-CM

## 2022-12-04 LAB
ALBUMIN SERPL-MCNC: 4 GM/DL (ref 3.4–5)
ALP BLD-CCNC: 61 U/L (ref 46–116)
ALT SERPL-CCNC: 44 U/L (ref 14–63)
ANION GAP: 12 MEQ/L (ref 8–16)
AST SERPL-CCNC: 31 U/L (ref 15–37)
BASOPHILS # BLD: 0.1 % (ref 0–3)
BASOPHILS ABSOLUTE: 0 THOU/MM3 (ref 0–0.1)
BILIRUB SERPL-MCNC: 0.3 MG/DL (ref 0.2–1)
BUN BLDV-MCNC: 18 MG/DL (ref 7–18)
CHLORIDE BLD-SCNC: 107 MEQ/L (ref 98–107)
CO2: 24 MEQ/L (ref 21–32)
CREAT SERPL-MCNC: 0.8 MG/DL (ref 0.6–1.3)
EOSINOPHILS ABSOLUTE: 0.1 THOU/MM3 (ref 0–0.5)
EOSINOPHILS RELATIVE PERCENT: 0.4 % (ref 0–4)
GFR, ESTIMATED: > 60 ML/MIN/1.73M2
GLUCOSE BLD-MCNC: 142 MG/DL (ref 74–106)
HCT VFR BLD CALC: 33.6 % (ref 37–47)
HEMOGLOBIN: 11.3 GM/DL (ref 12–16)
IMMATURE GRANS (ABS): 0.01 THOU/MM3 (ref 0–0.07)
IMMATURE GRANULOCYTES: 0 %
LACTATE: 2.1 MMOL/L (ref 0.9–1.7)
LYMPHOCYTES # BLD: 17.7 % (ref 15–47)
LYMPHOCYTES ABSOLUTE: 2 THOU/MM3 (ref 1–4.8)
MAGNESIUM: 1 MG/DL (ref 1.8–2.4)
MCH RBC QN AUTO: 31.2 PG (ref 26–32)
MCHC RBC AUTO-ENTMCNC: 33.6 GM/DL (ref 31–35)
MCV RBC AUTO: 92.8 FL (ref 81–99)
MONOCYTES: 0.5 THOU/MM3 (ref 0.3–1.3)
MONOCYTES: 4 % (ref 0–12)
PDW BLD-RTO: 13.2 % (ref 11.5–14.9)
PLATELET # BLD: 274 THOU/MM3 (ref 130–400)
PMV BLD AUTO: 10.1 FL (ref 9.4–12.4)
POC CALCIUM: 9.2 MG/DL (ref 8.5–10.1)
POTASSIUM SERPL-SCNC: 3.8 MEQ/L (ref 3.5–5.1)
RBC # BLD: 3.62 MILL/MM3 (ref 4.1–5.3)
SEG NEUTROPHILS: 77.7 % (ref 43–75)
SEGMENTED NEUTROPHILS ABSOLUTE COUNT: 8.7 THOU/MM3 (ref 1.8–7.7)
SODIUM BLD-SCNC: 143 MEQ/L (ref 136–145)
TOTAL PROTEIN: 7.3 GM/DL (ref 6.4–8.2)
WBC # BLD: 11.2 THOU/MM3 (ref 4.8–10.8)

## 2022-12-04 PROCEDURE — 80053 COMPREHEN METABOLIC PANEL: CPT

## 2022-12-04 PROCEDURE — 83735 ASSAY OF MAGNESIUM: CPT

## 2022-12-04 PROCEDURE — 96376 TX/PRO/DX INJ SAME DRUG ADON: CPT

## 2022-12-04 PROCEDURE — 96361 HYDRATE IV INFUSION ADD-ON: CPT

## 2022-12-04 PROCEDURE — 85025 COMPLETE CBC W/AUTO DIFF WBC: CPT

## 2022-12-04 PROCEDURE — 6360000004 HC RX CONTRAST MEDICATION: Performed by: FAMILY MEDICINE

## 2022-12-04 PROCEDURE — 2580000003 HC RX 258: Performed by: FAMILY MEDICINE

## 2022-12-04 PROCEDURE — 74177 CT ABD & PELVIS W/CONTRAST: CPT

## 2022-12-04 PROCEDURE — 96374 THER/PROPH/DIAG INJ IV PUSH: CPT

## 2022-12-04 PROCEDURE — 96375 TX/PRO/DX INJ NEW DRUG ADDON: CPT

## 2022-12-04 PROCEDURE — 83605 ASSAY OF LACTIC ACID: CPT

## 2022-12-04 PROCEDURE — 99285 EMERGENCY DEPT VISIT HI MDM: CPT

## 2022-12-04 PROCEDURE — 6360000002 HC RX W HCPCS: Performed by: FAMILY MEDICINE

## 2022-12-04 RX ORDER — ONDANSETRON 2 MG/ML
4 INJECTION INTRAMUSCULAR; INTRAVENOUS ONCE
Status: COMPLETED | OUTPATIENT
Start: 2022-12-04 | End: 2022-12-04

## 2022-12-04 RX ORDER — CYCLOBENZAPRINE HCL 10 MG
10 TABLET ORAL 3 TIMES DAILY PRN
COMMUNITY

## 2022-12-04 RX ORDER — 0.9 % SODIUM CHLORIDE 0.9 %
1000 INTRAVENOUS SOLUTION INTRAVENOUS ONCE
Status: COMPLETED | OUTPATIENT
Start: 2022-12-04 | End: 2022-12-05

## 2022-12-04 RX ADMIN — ONDANSETRON 4 MG: 2 INJECTION INTRAMUSCULAR; INTRAVENOUS at 22:02

## 2022-12-04 RX ADMIN — HYDROMORPHONE HYDROCHLORIDE 1 MG: 1 INJECTION, SOLUTION INTRAMUSCULAR; INTRAVENOUS; SUBCUTANEOUS at 22:02

## 2022-12-04 RX ADMIN — IOPAMIDOL 100 ML: 755 INJECTION, SOLUTION INTRAVENOUS at 22:49

## 2022-12-04 RX ADMIN — SODIUM CHLORIDE 250 ML: 9 INJECTION, SOLUTION INTRAVENOUS at 22:12

## 2022-12-04 RX ADMIN — HYDROMORPHONE HYDROCHLORIDE 0.5 MG: 1 INJECTION, SOLUTION INTRAMUSCULAR; INTRAVENOUS; SUBCUTANEOUS at 23:51

## 2022-12-04 ASSESSMENT — LIFESTYLE VARIABLES: HOW OFTEN DO YOU HAVE A DRINK CONTAINING ALCOHOL: NEVER

## 2022-12-04 ASSESSMENT — ENCOUNTER SYMPTOMS
NAUSEA: 1
COUGH: 0
SHORTNESS OF BREATH: 0
ABDOMINAL PAIN: 1
COLOR CHANGE: 0
VOMITING: 1

## 2022-12-04 ASSESSMENT — PAIN SCALES - GENERAL
PAINLEVEL_OUTOF10: 5
PAINLEVEL_OUTOF10: 10
PAINLEVEL_OUTOF10: 10

## 2022-12-04 ASSESSMENT — PAIN DESCRIPTION - ORIENTATION
ORIENTATION: RIGHT;UPPER;LOWER
ORIENTATION: RIGHT;LOWER;UPPER
ORIENTATION: RIGHT

## 2022-12-04 ASSESSMENT — PAIN DESCRIPTION - LOCATION
LOCATION: ABDOMEN

## 2022-12-04 ASSESSMENT — PAIN - FUNCTIONAL ASSESSMENT
PAIN_FUNCTIONAL_ASSESSMENT: 0-10
PAIN_FUNCTIONAL_ASSESSMENT: 0-10

## 2022-12-04 ASSESSMENT — PAIN DESCRIPTION - DESCRIPTORS: DESCRIPTORS: DISCOMFORT

## 2022-12-05 ENCOUNTER — ANESTHESIA (OUTPATIENT)
Dept: OPERATING ROOM | Age: 72
End: 2022-12-05
Payer: MEDICARE

## 2022-12-05 ENCOUNTER — ANESTHESIA EVENT (OUTPATIENT)
Dept: OPERATING ROOM | Age: 72
End: 2022-12-05
Payer: MEDICARE

## 2022-12-05 PROBLEM — K56.600 PARTIAL SMALL BOWEL OBSTRUCTION (HCC): Status: ACTIVE | Noted: 2022-12-05

## 2022-12-05 LAB
AMORPHOUS: ABNORMAL
ANION GAP SERPL CALCULATED.3IONS-SCNC: 12 MEQ/L (ref 8–16)
BACTERIA: ABNORMAL
BASOPHILS # BLD: 0.1 %
BASOPHILS ABSOLUTE: 0 THOU/MM3 (ref 0–0.1)
BILIRUBIN URINE: NEGATIVE
BLOOD, URINE: ABNORMAL
BUN BLDV-MCNC: 16 MG/DL (ref 7–22)
CALCIUM SERPL-MCNC: 9.4 MG/DL (ref 8.5–10.5)
CASTS UA: ABNORMAL /LPF
CHARACTER, URINE: CLEAR
CHLORIDE BLD-SCNC: 106 MEQ/L (ref 98–111)
CO2: 23 MEQ/L (ref 23–33)
COLOR: YELLOW
CREAT SERPL-MCNC: 0.7 MG/DL (ref 0.4–1.2)
CRYSTALS, UA: ABNORMAL
EOSINOPHIL # BLD: 0.1 %
EOSINOPHILS ABSOLUTE: 0 THOU/MM3 (ref 0–0.4)
EPITHELIAL CELLS, UA: ABNORMAL /HPF
ERYTHROCYTE [DISTWIDTH] IN BLOOD BY AUTOMATED COUNT: 13.3 % (ref 11.5–14.5)
ERYTHROCYTE [DISTWIDTH] IN BLOOD BY AUTOMATED COUNT: 45.8 FL (ref 35–45)
GFR SERPL CREATININE-BSD FRML MDRD: > 60 ML/MIN/1.73M2
GLUCOSE BLD-MCNC: 97 MG/DL (ref 70–108)
GLUCOSE, URINE: NEGATIVE MG/DL
HCT VFR BLD CALC: 32.9 % (ref 37–47)
HEMOGLOBIN: 10.8 GM/DL (ref 12–16)
IMMATURE GRANS (ABS): 0.02 THOU/MM3 (ref 0–0.07)
IMMATURE GRANULOCYTES: 0.2 %
KETONES, URINE: NEGATIVE
LACTIC ACID: 1 MMOL/L (ref 0.5–2)
LACTIC ACID: 1.1 MMOL/L (ref 0.5–2)
LEUKOCYTE ESTERASE, URINE: NEGATIVE
LYMPHOCYTES # BLD: 21.7 %
LYMPHOCYTES ABSOLUTE: 1.9 THOU/MM3 (ref 1–4.8)
MCH RBC QN AUTO: 31.2 PG (ref 26–33)
MCHC RBC AUTO-ENTMCNC: 32.8 GM/DL (ref 32.2–35.5)
MCV RBC AUTO: 95.1 FL (ref 81–99)
MONOCYTES # BLD: 5 %
MONOCYTES ABSOLUTE: 0.4 THOU/MM3 (ref 0.4–1.3)
MUCUS: ABNORMAL
NITRITE, URINE: NEGATIVE
NUCLEATED RED BLOOD CELLS: 0 /100 WBC
PH UA: 6.5 (ref 5–9)
PLATELET # BLD: 250 THOU/MM3 (ref 130–400)
PMV BLD AUTO: 10.1 FL (ref 9.4–12.4)
POTASSIUM REFLEX MAGNESIUM: 4.5 MEQ/L (ref 3.5–5.2)
PROTEIN UA: NEGATIVE MG/DL
RBC # BLD: 3.46 MILL/MM3 (ref 4.2–5.4)
RBC UA: ABNORMAL /HPF
REFLEX TO URINE C & S: ABNORMAL
SEG NEUTROPHILS: 72.9 %
SEGMENTED NEUTROPHILS ABSOLUTE COUNT: 6.3 THOU/MM3 (ref 1.8–7.7)
SODIUM BLD-SCNC: 141 MEQ/L (ref 135–145)
SPECIFIC GRAVITY UA: 1.01 (ref 1–1.03)
UROBILINOGEN, URINE: 0.2 EU/DL (ref 0–1)
WBC # BLD: 8.7 THOU/MM3 (ref 4.8–10.8)
WBC UA: ABNORMAL /HPF

## 2022-12-05 PROCEDURE — 2580000003 HC RX 258: Performed by: NURSE PRACTITIONER

## 2022-12-05 PROCEDURE — 7100000001 HC PACU RECOVERY - ADDTL 15 MIN: Performed by: SURGERY

## 2022-12-05 PROCEDURE — 7100000000 HC PACU RECOVERY - FIRST 15 MIN: Performed by: SURGERY

## 2022-12-05 PROCEDURE — A4216 STERILE WATER/SALINE, 10 ML: HCPCS | Performed by: NURSE PRACTITIONER

## 2022-12-05 PROCEDURE — 3700000000 HC ANESTHESIA ATTENDED CARE: Performed by: SURGERY

## 2022-12-05 PROCEDURE — 6360000002 HC RX W HCPCS: Performed by: SURGERY

## 2022-12-05 PROCEDURE — C9113 INJ PANTOPRAZOLE SODIUM, VIA: HCPCS | Performed by: NURSE PRACTITIONER

## 2022-12-05 PROCEDURE — 2580000003 HC RX 258: Performed by: SURGERY

## 2022-12-05 PROCEDURE — 3600000004 HC SURGERY LEVEL 4 BASE: Performed by: SURGERY

## 2022-12-05 PROCEDURE — 2500000003 HC RX 250 WO HCPCS: Performed by: SURGERY

## 2022-12-05 PROCEDURE — 0DTF0ZZ RESECTION OF RIGHT LARGE INTESTINE, OPEN APPROACH: ICD-10-PCS | Performed by: SURGERY

## 2022-12-05 PROCEDURE — 6360000002 HC RX W HCPCS: Performed by: ANESTHESIOLOGY

## 2022-12-05 PROCEDURE — 2580000003 HC RX 258: Performed by: ANESTHESIOLOGY

## 2022-12-05 PROCEDURE — 36415 COLL VENOUS BLD VENIPUNCTURE: CPT

## 2022-12-05 PROCEDURE — 99223 1ST HOSP IP/OBS HIGH 75: CPT | Performed by: SURGERY

## 2022-12-05 PROCEDURE — 44160 REMOVAL OF COLON: CPT | Performed by: SURGERY

## 2022-12-05 PROCEDURE — 6360000002 HC RX W HCPCS: Performed by: NURSE PRACTITIONER

## 2022-12-05 PROCEDURE — 83605 ASSAY OF LACTIC ACID: CPT

## 2022-12-05 PROCEDURE — 1200000000 HC SEMI PRIVATE

## 2022-12-05 PROCEDURE — 88307 TISSUE EXAM BY PATHOLOGIST: CPT

## 2022-12-05 PROCEDURE — 6360000002 HC RX W HCPCS

## 2022-12-05 PROCEDURE — 2580000003 HC RX 258: Performed by: FAMILY MEDICINE

## 2022-12-05 PROCEDURE — 81001 URINALYSIS AUTO W/SCOPE: CPT

## 2022-12-05 PROCEDURE — 2500000003 HC RX 250 WO HCPCS: Performed by: ANESTHESIOLOGY

## 2022-12-05 PROCEDURE — 80048 BASIC METABOLIC PNL TOTAL CA: CPT

## 2022-12-05 PROCEDURE — 85025 COMPLETE CBC W/AUTO DIFF WBC: CPT

## 2022-12-05 PROCEDURE — 2709999900 HC NON-CHARGEABLE SUPPLY: Performed by: SURGERY

## 2022-12-05 PROCEDURE — 3700000001 HC ADD 15 MINUTES (ANESTHESIA): Performed by: SURGERY

## 2022-12-05 PROCEDURE — 2720000010 HC SURG SUPPLY STERILE: Performed by: SURGERY

## 2022-12-05 PROCEDURE — 3600000014 HC SURGERY LEVEL 4 ADDTL 15MIN: Performed by: SURGERY

## 2022-12-05 RX ORDER — DEXAMETHASONE SODIUM PHOSPHATE 10 MG/ML
INJECTION, EMULSION INTRAMUSCULAR; INTRAVENOUS PRN
Status: DISCONTINUED | OUTPATIENT
Start: 2022-12-05 | End: 2022-12-05 | Stop reason: SDUPTHER

## 2022-12-05 RX ORDER — LIDOCAINE HYDROCHLORIDE 20 MG/ML
INJECTION, SOLUTION EPIDURAL; INFILTRATION; INTRACAUDAL; PERINEURAL PRN
Status: DISCONTINUED | OUTPATIENT
Start: 2022-12-05 | End: 2022-12-05 | Stop reason: SDUPTHER

## 2022-12-05 RX ORDER — SODIUM CHLORIDE 0.9 % (FLUSH) 0.9 %
5-40 SYRINGE (ML) INJECTION EVERY 12 HOURS SCHEDULED
Status: DISCONTINUED | OUTPATIENT
Start: 2022-12-05 | End: 2022-12-05 | Stop reason: HOSPADM

## 2022-12-05 RX ORDER — POTASSIUM CHLORIDE 7.45 MG/ML
10 INJECTION INTRAVENOUS PRN
Status: DISCONTINUED | OUTPATIENT
Start: 2022-12-05 | End: 2022-12-10 | Stop reason: HOSPADM

## 2022-12-05 RX ORDER — POTASSIUM CHLORIDE 20 MEQ/1
40 TABLET, EXTENDED RELEASE ORAL PRN
Status: DISCONTINUED | OUTPATIENT
Start: 2022-12-05 | End: 2022-12-10 | Stop reason: HOSPADM

## 2022-12-05 RX ORDER — ONDANSETRON 2 MG/ML
INJECTION INTRAMUSCULAR; INTRAVENOUS PRN
Status: DISCONTINUED | OUTPATIENT
Start: 2022-12-05 | End: 2022-12-05 | Stop reason: SDUPTHER

## 2022-12-05 RX ORDER — ACETAMINOPHEN 325 MG/1
650 TABLET ORAL EVERY 4 HOURS PRN
Status: DISCONTINUED | OUTPATIENT
Start: 2022-12-05 | End: 2022-12-10 | Stop reason: HOSPADM

## 2022-12-05 RX ORDER — MIDAZOLAM HYDROCHLORIDE 1 MG/ML
INJECTION INTRAMUSCULAR; INTRAVENOUS
Status: COMPLETED
Start: 2022-12-05 | End: 2022-12-05

## 2022-12-05 RX ORDER — SODIUM CHLORIDE 9 MG/ML
INJECTION, SOLUTION INTRAVENOUS PRN
Status: DISCONTINUED | OUTPATIENT
Start: 2022-12-05 | End: 2022-12-09

## 2022-12-05 RX ORDER — ENOXAPARIN SODIUM 100 MG/ML
40 INJECTION SUBCUTANEOUS DAILY
Status: DISCONTINUED | OUTPATIENT
Start: 2022-12-05 | End: 2022-12-06

## 2022-12-05 RX ORDER — FENTANYL CITRATE 50 UG/ML
INJECTION, SOLUTION INTRAMUSCULAR; INTRAVENOUS
Status: COMPLETED
Start: 2022-12-05 | End: 2022-12-05

## 2022-12-05 RX ORDER — LABETALOL HYDROCHLORIDE 5 MG/ML
10 INJECTION, SOLUTION INTRAVENOUS
Status: DISCONTINUED | OUTPATIENT
Start: 2022-12-05 | End: 2022-12-05 | Stop reason: HOSPADM

## 2022-12-05 RX ORDER — HYDRALAZINE HYDROCHLORIDE 20 MG/ML
10 INJECTION INTRAMUSCULAR; INTRAVENOUS
Status: DISCONTINUED | OUTPATIENT
Start: 2022-12-05 | End: 2022-12-05 | Stop reason: HOSPADM

## 2022-12-05 RX ORDER — FENTANYL CITRATE 50 UG/ML
50 INJECTION, SOLUTION INTRAMUSCULAR; INTRAVENOUS EVERY 5 MIN PRN
Status: COMPLETED | OUTPATIENT
Start: 2022-12-05 | End: 2022-12-05

## 2022-12-05 RX ORDER — DEXTROSE MONOHYDRATE 100 MG/ML
INJECTION, SOLUTION INTRAVENOUS CONTINUOUS PRN
Status: DISCONTINUED | OUTPATIENT
Start: 2022-12-05 | End: 2022-12-10 | Stop reason: HOSPADM

## 2022-12-05 RX ORDER — SODIUM CHLORIDE 0.9 % (FLUSH) 0.9 %
5-40 SYRINGE (ML) INJECTION PRN
Status: DISCONTINUED | OUTPATIENT
Start: 2022-12-05 | End: 2022-12-09

## 2022-12-05 RX ORDER — BUPIVACAINE HYDROCHLORIDE 5 MG/ML
INJECTION, SOLUTION PERINEURAL PRN
Status: DISCONTINUED | OUTPATIENT
Start: 2022-12-05 | End: 2022-12-05 | Stop reason: ALTCHOICE

## 2022-12-05 RX ORDER — BUSPIRONE HYDROCHLORIDE 10 MG/1
10 TABLET ORAL DAILY
COMMUNITY
Start: 2022-11-16

## 2022-12-05 RX ORDER — MIDAZOLAM HYDROCHLORIDE 1 MG/ML
0.5 INJECTION INTRAMUSCULAR; INTRAVENOUS EVERY 5 MIN PRN
Status: DISCONTINUED | OUTPATIENT
Start: 2022-12-05 | End: 2022-12-05 | Stop reason: HOSPADM

## 2022-12-05 RX ORDER — TRAMADOL HYDROCHLORIDE 50 MG/1
50 TABLET ORAL 2 TIMES DAILY PRN
Status: ON HOLD | COMMUNITY
Start: 2022-09-26 | End: 2022-12-09 | Stop reason: HOSPADM

## 2022-12-05 RX ORDER — ONDANSETRON 4 MG/1
4 TABLET, ORALLY DISINTEGRATING ORAL EVERY 8 HOURS PRN
Status: DISCONTINUED | OUTPATIENT
Start: 2022-12-05 | End: 2022-12-10 | Stop reason: HOSPADM

## 2022-12-05 RX ORDER — SODIUM CHLORIDE 9 MG/ML
INJECTION, SOLUTION INTRAVENOUS CONTINUOUS
Status: DISCONTINUED | OUTPATIENT
Start: 2022-12-05 | End: 2022-12-05 | Stop reason: DRUGHIGH

## 2022-12-05 RX ORDER — SODIUM CHLORIDE 0.9 % (FLUSH) 0.9 %
5-40 SYRINGE (ML) INJECTION PRN
Status: DISCONTINUED | OUTPATIENT
Start: 2022-12-05 | End: 2022-12-05 | Stop reason: HOSPADM

## 2022-12-05 RX ORDER — SODIUM CHLORIDE, SODIUM LACTATE, POTASSIUM CHLORIDE, CALCIUM CHLORIDE 600; 310; 30; 20 MG/100ML; MG/100ML; MG/100ML; MG/100ML
INJECTION, SOLUTION INTRAVENOUS CONTINUOUS PRN
Status: DISCONTINUED | OUTPATIENT
Start: 2022-12-05 | End: 2022-12-05 | Stop reason: SDUPTHER

## 2022-12-05 RX ORDER — FENTANYL CITRATE 50 UG/ML
INJECTION, SOLUTION INTRAMUSCULAR; INTRAVENOUS PRN
Status: DISCONTINUED | OUTPATIENT
Start: 2022-12-05 | End: 2022-12-05 | Stop reason: SDUPTHER

## 2022-12-05 RX ORDER — INSULIN LISPRO 100 [IU]/ML
0-4 INJECTION, SOLUTION INTRAVENOUS; SUBCUTANEOUS NIGHTLY
Status: DISCONTINUED | OUTPATIENT
Start: 2022-12-05 | End: 2022-12-06 | Stop reason: SDUPTHER

## 2022-12-05 RX ORDER — SODIUM CHLORIDE 9 MG/ML
INJECTION, SOLUTION INTRAVENOUS PRN
Status: DISCONTINUED | OUTPATIENT
Start: 2022-12-05 | End: 2022-12-05 | Stop reason: HOSPADM

## 2022-12-05 RX ORDER — SODIUM CHLORIDE 0.9 % (FLUSH) 0.9 %
5-40 SYRINGE (ML) INJECTION EVERY 12 HOURS SCHEDULED
Status: DISCONTINUED | OUTPATIENT
Start: 2022-12-05 | End: 2022-12-09

## 2022-12-05 RX ORDER — ONDANSETRON 2 MG/ML
4 INJECTION INTRAMUSCULAR; INTRAVENOUS EVERY 6 HOURS PRN
Status: DISCONTINUED | OUTPATIENT
Start: 2022-12-05 | End: 2022-12-10 | Stop reason: HOSPADM

## 2022-12-05 RX ORDER — MAGNESIUM SULFATE IN WATER 40 MG/ML
2000 INJECTION, SOLUTION INTRAVENOUS PRN
Status: DISCONTINUED | OUTPATIENT
Start: 2022-12-05 | End: 2022-12-10 | Stop reason: HOSPADM

## 2022-12-05 RX ORDER — INSULIN LISPRO 100 [IU]/ML
0-4 INJECTION, SOLUTION INTRAVENOUS; SUBCUTANEOUS
Status: DISCONTINUED | OUTPATIENT
Start: 2022-12-05 | End: 2022-12-06 | Stop reason: SDUPTHER

## 2022-12-05 RX ORDER — SODIUM CHLORIDE 9 MG/ML
INJECTION, SOLUTION INTRAVENOUS CONTINUOUS
Status: DISCONTINUED | OUTPATIENT
Start: 2022-12-05 | End: 2022-12-08

## 2022-12-05 RX ADMIN — HYDROMORPHONE HYDROCHLORIDE 0.5 MG: 1 INJECTION, SOLUTION INTRAMUSCULAR; INTRAVENOUS; SUBCUTANEOUS at 14:50

## 2022-12-05 RX ADMIN — MIDAZOLAM HYDROCHLORIDE 0.5 MG: 1 INJECTION INTRAMUSCULAR; INTRAVENOUS at 14:56

## 2022-12-05 RX ADMIN — PIPERACILLIN AND TAZOBACTAM 3375 MG: 3; .375 INJECTION, POWDER, FOR SOLUTION INTRAVENOUS at 20:29

## 2022-12-05 RX ADMIN — HYDROMORPHONE HYDROCHLORIDE 0.25 MG: 1 INJECTION, SOLUTION INTRAMUSCULAR; INTRAVENOUS; SUBCUTANEOUS at 05:39

## 2022-12-05 RX ADMIN — FENTANYL CITRATE 50 MCG: 50 INJECTION, SOLUTION INTRAMUSCULAR; INTRAVENOUS at 14:40

## 2022-12-05 RX ADMIN — ONDANSETRON 4 MG: 2 INJECTION INTRAMUSCULAR; INTRAVENOUS at 13:50

## 2022-12-05 RX ADMIN — MIDAZOLAM 0.5 MG: 1 INJECTION INTRAMUSCULAR; INTRAVENOUS at 14:56

## 2022-12-05 RX ADMIN — SODIUM CHLORIDE, POTASSIUM CHLORIDE, SODIUM LACTATE AND CALCIUM CHLORIDE: 600; 310; 30; 20 INJECTION, SOLUTION INTRAVENOUS at 14:05

## 2022-12-05 RX ADMIN — HYDROMORPHONE HYDROCHLORIDE 0.25 MG: 1 INJECTION, SOLUTION INTRAMUSCULAR; INTRAVENOUS; SUBCUTANEOUS at 14:30

## 2022-12-05 RX ADMIN — FENTANYL CITRATE 50 MCG: 50 INJECTION INTRAMUSCULAR; INTRAVENOUS at 12:51

## 2022-12-05 RX ADMIN — LIDOCAINE HYDROCHLORIDE 60 MG: 20 INJECTION, SOLUTION EPIDURAL; INFILTRATION; INTRACAUDAL; PERINEURAL at 12:51

## 2022-12-05 RX ADMIN — HYDROMORPHONE HYDROCHLORIDE 0.5 MG: 1 INJECTION, SOLUTION INTRAMUSCULAR; INTRAVENOUS; SUBCUTANEOUS at 15:05

## 2022-12-05 RX ADMIN — HYDROMORPHONE HYDROCHLORIDE 0.25 MG: 1 INJECTION, SOLUTION INTRAMUSCULAR; INTRAVENOUS; SUBCUTANEOUS at 14:35

## 2022-12-05 RX ADMIN — FENTANYL CITRATE 50 MCG: 50 INJECTION INTRAMUSCULAR; INTRAVENOUS at 14:07

## 2022-12-05 RX ADMIN — FENTANYL CITRATE 50 MCG: 50 INJECTION INTRAMUSCULAR; INTRAVENOUS at 14:18

## 2022-12-05 RX ADMIN — PIPERACILLIN AND TAZOBACTAM 3375 MG: 3; .375 INJECTION, POWDER, FOR SOLUTION INTRAVENOUS at 13:00

## 2022-12-05 RX ADMIN — HYDROMORPHONE HYDROCHLORIDE 1 MG: 1 INJECTION, SOLUTION INTRAMUSCULAR; INTRAVENOUS; SUBCUTANEOUS at 23:15

## 2022-12-05 RX ADMIN — HYDROMORPHONE HYDROCHLORIDE 0.5 MG: 1 INJECTION, SOLUTION INTRAMUSCULAR; INTRAVENOUS; SUBCUTANEOUS at 14:55

## 2022-12-05 RX ADMIN — SUGAMMADEX 105 MG: 100 INJECTION, SOLUTION INTRAVENOUS at 14:02

## 2022-12-05 RX ADMIN — SODIUM CHLORIDE: 9 INJECTION, SOLUTION INTRAVENOUS at 20:23

## 2022-12-05 RX ADMIN — HYDROMORPHONE HYDROCHLORIDE 0.5 MG: 1 INJECTION, SOLUTION INTRAMUSCULAR; INTRAVENOUS; SUBCUTANEOUS at 16:13

## 2022-12-05 RX ADMIN — SODIUM CHLORIDE: 9 INJECTION, SOLUTION INTRAVENOUS at 05:25

## 2022-12-05 RX ADMIN — DEXAMETHASONE SODIUM PHOSPHATE 6 MG: 10 INJECTION, EMULSION INTRAMUSCULAR; INTRAVENOUS at 13:22

## 2022-12-05 RX ADMIN — FENTANYL CITRATE 50 MCG: 50 INJECTION INTRAMUSCULAR; INTRAVENOUS at 12:48

## 2022-12-05 RX ADMIN — PIPERACILLIN AND TAZOBACTAM 4500 MG: 4; .5 INJECTION, POWDER, FOR SOLUTION INTRAVENOUS at 08:26

## 2022-12-05 RX ADMIN — SODIUM CHLORIDE, PRESERVATIVE FREE 5 ML: 5 INJECTION INTRAVENOUS at 20:23

## 2022-12-05 RX ADMIN — SODIUM CHLORIDE, POTASSIUM CHLORIDE, SODIUM LACTATE AND CALCIUM CHLORIDE: 600; 310; 30; 20 INJECTION, SOLUTION INTRAVENOUS at 12:45

## 2022-12-05 RX ADMIN — SODIUM CHLORIDE 250 ML/HR: 9 INJECTION, SOLUTION INTRAVENOUS at 00:48

## 2022-12-05 RX ADMIN — FENTANYL CITRATE 50 MCG: 50 INJECTION, SOLUTION INTRAMUSCULAR; INTRAVENOUS at 14:45

## 2022-12-05 RX ADMIN — HYDROMORPHONE HYDROCHLORIDE 1 MG: 1 INJECTION, SOLUTION INTRAMUSCULAR; INTRAVENOUS; SUBCUTANEOUS at 18:13

## 2022-12-05 RX ADMIN — FENTANYL CITRATE 50 MCG: 50 INJECTION INTRAMUSCULAR; INTRAVENOUS at 14:12

## 2022-12-05 RX ADMIN — SODIUM CHLORIDE 40 MG: 9 INJECTION, SOLUTION INTRAMUSCULAR; INTRAVENOUS; SUBCUTANEOUS at 08:32

## 2022-12-05 ASSESSMENT — PAIN SCALES - GENERAL
PAINLEVEL_OUTOF10: 10
PAINLEVEL_OUTOF10: 6
PAINLEVEL_OUTOF10: 9
PAINLEVEL_OUTOF10: 8
PAINLEVEL_OUTOF10: 10
PAINLEVEL_OUTOF10: 1
PAINLEVEL_OUTOF10: 5
PAINLEVEL_OUTOF10: 7
PAINLEVEL_OUTOF10: 2
PAINLEVEL_OUTOF10: 6
PAINLEVEL_OUTOF10: 9
PAINLEVEL_OUTOF10: 6
PAINLEVEL_OUTOF10: 3
PAINLEVEL_OUTOF10: 3
PAINLEVEL_OUTOF10: 10

## 2022-12-05 ASSESSMENT — PAIN DESCRIPTION - PAIN TYPE
TYPE: ACUTE PAIN
TYPE: ACUTE PAIN

## 2022-12-05 ASSESSMENT — PAIN DESCRIPTION - DESCRIPTORS
DESCRIPTORS: CRAMPING;SHARP
DESCRIPTORS: ACHING
DESCRIPTORS: ACHING;CRAMPING
DESCRIPTORS: ACHING;CRAMPING
DESCRIPTORS: ACHING

## 2022-12-05 ASSESSMENT — PAIN - FUNCTIONAL ASSESSMENT
PAIN_FUNCTIONAL_ASSESSMENT: ACTIVITIES ARE NOT PREVENTED

## 2022-12-05 ASSESSMENT — PAIN DESCRIPTION - LOCATION
LOCATION: ABDOMEN

## 2022-12-05 NOTE — H&P
[address] Surgery History & Physical - Isabelle Collins MD      Pt Name: Shane Amin  MRN: [id number]  YOB: 1950  Date of evaluation: 12/5/2022  Primary Care Physician: KARLA Kamara - CNP  Patient evaluated at the request of  Dr. Aurora Ariza  Reason for evaluation:   IMPRESSIONS   Chronic distention with proximal distal narrowing and appendix and right upper quadrant consistent with a volvulus type picture  Diabetes mellitus  Hypertension   has a past medical history of Anxiety, Blood transfusion, Diabetes mellitus ([address] Diverticulosis of colon, Hyperlipidemia, Hypertension, Localized, primary osteoarthritis of shoulder region, and Nausea & vomiting. PLANS   Patient with no evidence of peritoneal signs. Lactic acid is currently normal.  Right colon and cecum are markedly distended approaching 10 cm. Looks like there is some closed-loop component. I do think she warrants operative intervention with right colon resection. Patient agreeable to scheduling today as OR allows. Patient may need consultation with her internist just daily for management of diabetes and hypertension  Obtained and reviewed colonoscopy report as well as internal medicine notes. IV fluids and Zosyn for possible bacterial translocation. SUBJECTIVE   History of Chief Complaint:    Quyen Roberts is a 81 y. o.female who presents with abdominal pain. She had some nausea vomiting abdominal pain around Thanksgiving which resolved. She does have a history of irritable bowel syndrome and a hysterectomy. Her ovaries and tubes remain. He had a rather acute onset yesterday of cramping abdominal pain she had nausea and emesis. She did have a bowel movement daily she has not had any diarrhea. She presented to [facility] ED for evaluation where CT imaging with findings as below markedly distended colon with possible early closed-loop obstruction with appendix in the right upper quadrant.   It appears as she may have some type of volvulus picture on my review of the films. She has no evidence of perforation at this point she does report decreased abdominal distention but is passing no flatus. She had a normal stress test in 2017 and a colonoscopy by Dr. Tanner Ortiz within the last year or 2. She had a previous Meckel's diverticulectomy as well. Denies fever or chills. Past Medical History   has a past medical history of Anxiety, Blood transfusion, Diabetes mellitus ([address] Diverticulosis of colon, Hyperlipidemia, Hypertension, Localized, primary osteoarthritis of shoulder region, and Nausea & vomiting. Past Surgical History   has a past surgical history that includes joint replacement (Right, 2009 2018); Cholecystectomy; Foot surgery (Bilateral, 2004); shoulder surgery (Left, 1992); Lithotripsy; Colonoscopy; Endoscopy, colon, diagnostic; Meckel's diverticulum excision; Carpal tunnel release (Right); and Hysterectomy (1996). Medications  Prior to Admission medications    Medication Sig Start Date End Date Taking? Authorizing Provider   cyclobenzaprine (FLEXERIL) 10 MG tablet Take 10 mg by mouth 3 times daily as needed for Muscle spasms   Yes Historical Provider, MD   busPIRone (BUSPAR) 10 MG tablet Take 10 mg by mouth daily 11/16/22   Historical Provider, MD   traMADol (ULTRAM) 50 MG tablet Take 50 mg by mouth 2 times daily as needed.  9/26/22   Historical Provider, MD   Biotin 2500 MCG CAPS Take by mouth    Historical Provider, MD   vitamin B-12 (CYANOCOBALAMIN) 1000 MCG tablet Take 1,000 mcg by mouth daily  Patient not taking: Reported on 12/5/2022    Historical Provider, MD   levothyroxine (SYNTHROID) 25 MCG tablet Take 25 mcg by mouth Daily    Historical Provider, MD   meloxicam (MOBIC) 7.5 MG tablet Take 7.5 mg by mouth daily    Historical Provider, MD   metFORMIN (GLUCOPHAGE-XR) 500 MG extended release tablet Take 1,000 mg by mouth 2 times daily    Historical Provider, MD   hydroxychloroquine (PLAQUENIL) 200 MG tablet Take 300 mg by mouth daily  8/6/20   Historical Provider, MD   tolterodine (DETROL) 1 MG tablet Take 1 mg by mouth 2 times daily     Historical Provider, MD   Probiotic Product (PROBIOTIC DAILY PO) Take by mouth 2 times daily    Historical Provider, MD   atorvastatin (LIPITOR) 20 MG tablet Take 20 mg by mouth daily     Historical Provider, MD   TRULICITY 1.5 WR/3.0BD SOPN Inject 1.5 mg into the skin once a week     Historical Provider, MD   citalopram (CELEXA) 20 MG tablet Take 20 mg by mouth daily     Historical Provider, MD   Multiple Vitamins-Minerals (THERAPEUTIC MULTIVITAMIN-MINERALS) tablet Take 1 tablet by mouth daily    Historical Provider, MD   lisinopril (PRINIVIL;ZESTRIL) 2.5 MG tablet Take 2.5 mg by mouth daily     Historical Provider, MD    Scheduled Meds:   sodium chloride flush  5-40 mL IntraVENous 2 times per day    enoxaparin  40 mg SubCUTAneous Daily    pantoprazole (PROTONIX) 40 mg injection  40 mg IntraVENous Daily    insulin lispro  0-4 Units SubCUTAneous TID WC    insulin lispro  0-4 Units SubCUTAneous Nightly     Continuous Infusions:   sodium chloride      sodium chloride 75 mL/hr at 12/05/22 0525    dextrose       PRN Meds:.sodium chloride flush, sodium chloride, ondansetron **OR** ondansetron, acetaminophen, HYDROmorphone **OR** HYDROmorphone, glucose, dextrose bolus **OR** dextrose bolus, glucagon (rDNA), dextrose, magnesium sulfate, potassium chloride **OR** potassium alternative oral replacement **OR** potassium chloride  Allergies  has No Known Allergies. Family History  family history includes Arthritis in her father and mother; Cancer in her father; Heart Disease in her father; Stroke in her sister. Social History   reports that she has never smoked. She has never used smokeless tobacco. She reports current alcohol use. She reports that she does not use drugs.   Review of Systems:  13 point review of systems as above otherwise negative  OBJECTIVE   CURRENT VITALS:  height is 4' 11\" (1.499 m) and weight is 116 lb (52.6 kg). Her oral temperature is 98.2 °F (36.8 °C). Her blood pressure is 112/56 (abnormal) and her pulse is 97. Her respiration is 16 and oxygen saturation is 97%. Body mass index is 23.43 kg/m². Temperature Range (24h):Temp: 98.2 °F (36.8 °C) Temp  Av.9 °F (36.6 °C)  Min: 96.9 °F (36.1 °C)  Max: 98.5 °F (36.9 °C)  BP Range (30C): Systolic (05SZM), I , Min:108 , JBN:146     Diastolic (93CVS), PND:51, Min:56, Max:68    Pulse Range (24h): Pulse  Av  Min: 84  Max: 118  Respiration Range (24h): Resp  Av.8  Min: 16  Max: 20  Current Pulse Ox (24h):  SpO2: 97 %  Pulse Ox Range (24h):  SpO2  Av.6 %  Min: 97 %  Max: 100 %  Oxygen Amount and Delivery:    CONSTITUTIONAL: Alert and oriented times 3, no acute distress and cooperative to examination with proper mood and affect. SKIN: Skin color, texture, turgor normal. No rashes or lesions. LYMPH: no cervical nodes, no inguinal nodes  HEENT: Head is normocephalic, atraumatic. EOMI, PERRLA. NECK: supple, symmetrical, trachea midline. CHEST/LUNGS: chest symmetric with normal A/P diameter, normal respiratory rate and rhythm, lungs clear to auscultation without wheezes, rales or rhonchi. No accessory muscle use. Scars None   CARDIOVASCULAR: Heart sounds are normal.  Regular rate and rhythm without murmur, gallop or rub. Normal S1 and S2. Carotid and femoral pulses 2+/4 and equal bilaterally. ABDOMEN: Normal shape. Transverse lower abdomen scar(s) present. Hypoactive bowel sounds, soft, no guarding, not tympanic mildly distended, no evidence of herniaRECTAL: deferred, not clinically indicated  NEUROLOGIC: There are no focalizing motor or sensory deficits. CN II-XII are grossly intact. Vladimir [facility] EXTREMITIES: no cyanosis, no clubbing, and no edema.   LABS     Recent Labs     12/04/22  2140 22  0005   WBC 11.2*  --    HGB 11.3*  --    HCT 33.6*  --      --      --    K 3.8  --      --    CO2 24 --    BUN 18  --    CREATININE 0.8  --    MG 1.0*  --    AST 31  --    ALT 44  --    BILITOT 0.3  --    NITRU  --  NEGATIVE   COLORU  --  YELLOW   BACTERIA  --  FEW     RADIOLOGY     CT ABDOMEN PELVIS W IV CONTRAST Additional Contrast? Radiologist Recommendation (iv only)   Final Result   Impression:      Moderate to severe dilation cecum with narrowing proximally and distally      Findings concerning for developing closed-loop obstruction in the right    colon      This document has been electronically signed by: Russell Vivas MD on    12/04/2022 11:29 PM      All CTs at this facility use dose modulation techniques and iterative    reconstructions, and/or weight-based dosing   when appropriate to reduce radiation to a low as reasonably achievable.       Imaging reviewed and reviewed with patient  Electronically signed by Atiya Feldman MD on 12/5/2022 at 6:05 AM

## 2022-12-05 NOTE — PROGRESS NOTES
Pharmacy Note - Extended Infusion Beta-Lactam Adjustment    Piperacillin/Tazobactam 3375 mg q8H ordered for treatment of intra abdominal infection. Per [facility] Extended Infusion Beta-Lactam Policy, Zosyn will be changed to Zosyn 4500 mg x1 followed by zosyn 3375mg q8H. Estimated Creatinine Clearance: Estimated Creatinine Clearance: 50 mL/min (based on SCr of 0.8 mg/dL). Dialysis Status, BRIGHT, CKD: baseline    BMI: Body mass index is 23.43 kg/m². Rationale for Adjustment: Agent demonstrates time-dependent effect on bacterial eradication. Extended-infusion dosing strategy aims to enhance microbiologic and clinical efficacy. Pharmacy will continue to monitor and adjust dose as necessary. Please call with any questions.     Thank you,  Chandrakant Bruno, PharmD 12/5/2022 6:35 AM

## 2022-12-05 NOTE — CARE COORDINATION
Case Management Assessment  Initial Evaluation    Date/Time of Evaluation: 12/5/2022 12:16 PM  Assessment Completed by: Mimi Cavazos RN    If patient is discharged prior to next notation, then this note serves as note for discharge by case management. Patient Name: Ronald Guajardo                   YOB: 1950  Diagnosis: Partial small bowel obstruction ([facility] [address] [G31.157]  Complete intestinal obstruction, unspecified cause [facility]) [U26.805]                   Date / Time: 12/4/2022  9:34 PM    Patient Admission Status: Inpatient     Current PCP: KARLA Perez CNP  PCP verified by CM? Yes    Chart Reviewed: Yes      Patient Orientation: Alert and Oriented    Patient Cognition: Alert  History Provided by: Patient    Hospitalization in the last 30 days (Readmission):  No    If yes, Readmission Assessment in CM Navigator will be completed. Advance Directives:     Code Status: Full Code     Primary Decision Maker: Stacey Castañeda - Spouse - [phone]    Secondary Decision Maker: Maurice Gutierrez - Child - [phone]    Discharge Planning  Patient lives with: Spouse/Significant Other Type of Home: House   Primary Caregiver: Self  Patient Support Systems include: Spouse/Significant Other   Current Financial resources: Medicare  Current community resources:    Current services prior to admission: None   Type of Home Care services:  None    ADLS  Prior functional level: Independent in ADLs/IADLs  Current functional level: Independent in ADLs/IADLs      Family can provide assistance at DC: Yes  Would you like Case Management to discuss the discharge plan with any other family members/significant others, and if so, who?     Plans to Return to Present Housing: Yes  Other Identified Issues/Barriers to RETURNING to current housing: medical complications  Potential Assistance needed at discharge: N/A  Patient expects to discharge to: [address] [place] [address] for transportation at discharge: Self    Financial  Payor: Todd Ninfa / Plan: [address] / Product Type: *No Product type* /     Does insurance require precert for SNF: Yes    Potential assistance Purchasing Medications: No  Meds-to-Beds request: No      [address], [address] [phone]  [address] [address] [place] [address]  Phone: [phone] Fax: [phone]      Factors facilitating achievement of predicted outcomes: Family support    Barriers to discharge: Medical complications    Additional Case Management Notes: from ED, NG tube maintenance, electrolyte replacement protocols, diabetes management, pain and nausea control, IV fluids, Lovenox, Protonix, IV Zosyn. Procedure:   12/4 CT Abdomen Pelvis W IV Contrast Moderate to severe dilation cecum with narrowing proximally and distally     Findings concerning for developing closed-loop obstruction in the right   Colon    The Plan for Transition of Care is related to the following treatment goals of Partial small bowel obstruction ([address] [K56.600]  Complete intestinal obstruction, unspecified cause ([address] [K56.601]    Patient Goals/Plan/Treatment Preferences: Met with Svetlana Abdi. She currently lives at home with her . She is independent. Plan is to return home at discharge. She denies need for DME and declines HH. Transportation/Food Security/Housekeeping Addressed: No issues identified.      Yuli Bearden RN  Case Management Department

## 2022-12-05 NOTE — PLAN OF CARE
Problem: Discharge Planning  Goal: Discharge to home or other facility with appropriate resources  Outcome: Progressing  Flowsheets (Taken 12/5/2022 0218)  Discharge to home or other facility with appropriate resources:   Identify barriers to discharge with patient and caregiver   Arrange for needed discharge resources and transportation as appropriate   Identify discharge learning needs (meds, wound care, etc)   Refer to discharge planning if patient needs post-hospital services based on physician order or complex needs related to functional status, cognitive ability or social support system     Problem: Pain  Goal: Verbalizes/displays adequate comfort level or baseline comfort level  Outcome: Progressing  Flowsheets (Taken 12/5/2022 0400)  Verbalizes/displays adequate comfort level or baseline comfort level:   Encourage patient to monitor pain and request assistance   Assess pain using appropriate pain scale   Administer analgesics based on type and severity of pain and evaluate response   Care plan reviewed with patient. Patient verbalized understanding of the plan of care and contribute to goal setting.

## 2022-12-05 NOTE — OP NOTE
[address] [address]  Operative Report    PATIENT NAME: Sandy Vasquez  MEDICAL RECORD NO. 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 DRAINS: none  DISPOSITION: Recovery Room  CONDITION: stable      Narrative: Indications: See history and physical examination. Patient admitted with abdominal pain CT imaging consistent with cecal volvulus with right colon in the upper abdomen and appendix in the upper abdomen. Early closed-loop obstruction no evidence of perforation or bowel ischemia. Patient was recommended operative intervention even if symptoms improve high risk of recurrence. Patient expressed understanding wish to proceed. Risks including but not limited to bleeding, infection anastomotic leak, perioperative heart or pulmonary complications even death discussed. Risks of need for a diverting ostomy also discussed as well. Patient expressed understanding all questions answered. Procedure: Patient was brought operating suite placed supine on the operating table. She was administered general anesthetic endotracheal intubation she had pneumatic sequential compression devices on the lower extremities. She was given Zosyn intravenously preoperatively. Valencia catheter was sterilely inserted abdomen prepped and draped. Timeout was performed. Midline incision was made extending from above the umbilicus to just below. The abdominal cavity was entered there is no free fluid. All bowel appeared viable there was no right colon on the right side of the abdomen was all in the left upper quadrant. Cecum was distended with stool but all bowel was viable. There were some omental adhesions down in the pelvis which were taken down. No lateral attachments to the right colon. Transverse colon was divided with a TLC 75 stapling device after the mesentery was cleared terminal ileum and was divided with another load of the TLC 75 stapling device after mesentery was cleared from around the bowel. Mesentery was then serially clamped divided and ligated and the specimen passed off the operative field. A side-to-side isoperistaltic anastomosis was then completed between the terminal ileum and the transverse colon. Staple lines corners were cut and another load of the TLC 75 stapling device was passed down each limb of the bowel. It was closed and fired completing the anastomosis the enterotomy was closed with a TX 60 stapling device. Some Lembert sutures of Vicryl were placed around the anastomosis. Defect in the mesentery was closed with interrupted Vicryl suture. There is no evidence of bleeding bowel was placed back in the abdominal cavity. An Irrisept was allowed to dwell in the abdominal cavity. This point all staff changed gowns and gloves clean closure tray was utilized with all new instruments. Midline fascia was closed with interrupted #1 Ethibond suture. Dermis was closed with interrupted 3-0 Vicryl suture and skin was closed running subcuticular 3-0 Monocryl suture. Skin glue was applied. Patient was placed in an abdominal binder she was extubated in the operating suite and transported to recovery in stable condition. NG tube placement had been confirmed in the stomach manually no need for postoperative x-ray.

## 2022-12-05 NOTE — ED PROVIDER NOTES
[facility]  eMERGENCY dEPARTMENT eNCOUnter          CHIEF COMPLAINT       Chief Complaint   Patient presents with    Abdominal Pain    Emesis    Nausea       Nurses Notes reviewed and I agree except as noted in the HPI. HISTORY OF PRESENT ILLNESS    Cristy Rivera is a 81 y.o. female who presents with lower abdominal pain,nausea and vomiting. Symptoms started earlier today. Pain is continuous. Severe in nature. She describes patient as spasm like, thus she took a flexeril initially for pain however pain did not improve. Patient denies fever,chills. Notes history of diverticular disease. REVIEW OF SYSTEMS     Review of Systems   Constitutional:  Negative for chills and fever. Respiratory:  Negative for cough and shortness of breath. Cardiovascular:  Negative for chest pain and palpitations. Gastrointestinal:  Positive for abdominal pain, nausea and vomiting. Genitourinary:  Negative for difficulty urinating, dysuria, flank pain and frequency. Skin:  Negative for color change and rash. All other systems reviewed and are negative. PAST MEDICAL HISTORY    has a past medical history of Anxiety, Blood transfusion, Diabetes mellitus ([address] Diverticulosis of colon, Hyperlipidemia, Hypertension, Localized, primary osteoarthritis of shoulder region, and Nausea & vomiting. SURGICAL HISTORY      has a past surgical history that includes joint replacement (Right, 2009 2018); Cholecystectomy; Foot surgery (Bilateral, 2004); shoulder surgery (Left, 1992); Lithotripsy; Colonoscopy; Endoscopy, colon, diagnostic; Meckel's diverticulum excision; Carpal tunnel release (Right); and Hysterectomy (1996).     CURRENT MEDICATIONS       Previous Medications    ATORVASTATIN (LIPITOR) 20 MG TABLET    Take 20 mg by mouth daily     BIOTIN 2500 MCG CAPS    Take by mouth    CITALOPRAM (CELEXA) 20 MG TABLET    Take 20 mg by mouth daily     CYCLOBENZAPRINE (FLEXERIL) 10 MG TABLET    Take 10 mg by mouth 3 times daily as needed for Muscle spasms    HYDROXYCHLOROQUINE (PLAQUENIL) 200 MG TABLET    Take 300 mg by mouth daily     LEVOTHYROXINE (SYNTHROID) 25 MCG TABLET    Take 25 mcg by mouth Daily    LISINOPRIL (PRINIVIL;ZESTRIL) 2.5 MG TABLET    Take 2.5 mg by mouth daily     MELOXICAM (MOBIC) 7.5 MG TABLET    Take 7.5 mg by mouth daily    METFORMIN (GLUCOPHAGE-XR) 500 MG EXTENDED RELEASE TABLET    Take 1,000 mg by mouth 2 times daily    MULTIPLE VITAMINS-MINERALS (THERAPEUTIC MULTIVITAMIN-MINERALS) TABLET    Take 1 tablet by mouth daily    PROBIOTIC PRODUCT (PROBIOTIC DAILY PO)    Take by mouth 2 times daily    TOLTERODINE (DETROL) 1 MG TABLET    Take 1 mg by mouth 2 times daily     TRULICITY 1.5 MT/1.0OW SOPN    Inject 1.5 mg into the skin once a week     VITAMIN B-12 (CYANOCOBALAMIN) 1000 MCG TABLET    Take 1,000 mcg by mouth daily       ALLERGIES     has No Known Allergies. FAMILY HISTORY     She indicated that her mother is alive. She indicated that her father is . She indicated that her sister is alive. She indicated that only one of her two brothers is alive. family history includes Arthritis in her father and mother; Cancer in her father; Heart Disease in her father; Stroke in her sister. SOCIAL HISTORY      reports that she has never smoked. She has never used smokeless tobacco. She reports current alcohol use. She reports that she does not use drugs. PHYSICAL EXAM     INITIAL VITALS:  height is 4' 11\" (1.499 m) and weight is 116 lb (52.6 kg). Her tympanic temperature is 96.9 °F (36.1 °C). Her blood pressure is 122/62 and her pulse is 109 (abnormal). Her respiration is 16 and oxygen saturation is 100%. Physical Exam  Vitals and nursing note reviewed. Constitutional:       General: She is in acute distress. Eyes:      General: No scleral icterus. Cardiovascular:      Rate and Rhythm: Regular rhythm. Tachycardia present. Abdominal:      General: There is distension. Palpations: Abdomen is rigid. Tenderness: There is abdominal tenderness in the right lower quadrant, periumbilical area, suprapubic area and left lower quadrant. There is no guarding or rebound. Hernia: No hernia is present. Skin:     General: Skin is dry. Capillary Refill: Capillary refill takes less than 2 seconds. Coloration: Skin is not pale. Findings: No rash. Neurological:      General: No focal deficit present. Mental Status: She is alert. DIFFERENTIAL DIAGNOSIS:   Bowel obstruction,appendicitis,diverticulitis,    DIAGNOSTIC RESULTS     EKG: All EKG's are interpreted by the Emergency Department Physician who either signs or Co-signs this chart in the absence of a cardiologist.      RADIOLOGY: non-plain film images(s) such as CT, Ultrasound and MRI are read by the radiologist.      Solomon Lizama Additional Contrast? Radiologist Recommendation (iv only) (Final result)  Result time 12/04/22 23:29:25  Final result by Buck Retana MD (12/04/22 23:29:25)                Impression:    Impression:     Moderate to severe dilation cecum with narrowing proximally and distally     Findings concerning for developing closed-loop obstruction in the right   colon     This document has been electronically signed by: Yogesh Gross MD on   12/04/2022 11:29 PM     All CTs at this facility use dose modulation techniques and iterative   reconstructions, and/or weight-based dosing   when appropriate to reduce radiation to a low as reasonably achievable. Narrative:    CT abdomen and pelvis with contrast     Comparison: 12/6/2021     Findings:     Severe distention of the cecum with displacement towards the left. Spiral configuration noted right lower quadrant with colonic narrowing. Irregular narrowing also noted at the ileocecal valve with right upper   quadrant. There is no small bowel distention proximal to this.    There is no distention of the transverse or descending colon. Findings concerning for developing closed-loop obstruction. Lung bases are clear. No acute bony abnormalities. Liver and spleen are unremarkable. Cholecystectomy. Small right hepatic cyst is unchanged from prior study. Pancreas, adrenal glands and kidneys unremarkable. Nonobstructing right renal stone again noted. .   Lobular renal contours noted bilaterally. Question congenital versus renal scarring. No aortic aneurysm or dissection noted. No significant free fluid or adenopathy noted in the pelvis. No evidence for diverticulitis. Appendix unremarkable. The appendix is displaced into the right upper quadrant. This is new since prior study where it can be found in the right pelvis. Hysterectomy. No adnexal abnormality.                LABS:   Labs Reviewed   CBC WITH AUTO DIFFERENTIAL - Abnormal; Notable for the following components:       Result Value    WBC 11.2 (*)     RBC 3.62 (*)     Hemoglobin 11.3 (*)     Hematocrit 33.6 (*)     Seg Neutrophils 77.7 (*)     Segs Absolute 8.7 (*)     All other components within normal limits   COMPREHENSIVE METABOLIC PANEL - Abnormal; Notable for the following components:    Glucose 142 (*)     All other components within normal limits   MAGNESIUM - Abnormal; Notable for the following components:    Magnesium 1.0 (*)     All other components within normal limits   LACTIC ACID - Abnormal; Notable for the following components:    Lactate 2.10 (*)     All other components within normal limits   GLOMERULAR FILTRATION RATE, ESTIMATED   ANION GAP   URINALYSIS WITH REFLEX TO CULTURE       EMERGENCY DEPARTMENT COURSE:   Vitals:    Vitals:    12/04/22 2146 12/04/22 2240 12/04/22 2353   BP: (!) 108/56 (!) 126/58 122/62   Pulse: 84 (!) 102 (!) 109   Resp: 20 16 16   Temp: 96.9 °F (36.1 °C)     TempSrc: Tympanic     SpO2: 97% 99% 100%   Weight: 116 lb (52.6 kg)     Height: 4' 11\" (1.499 m)       Patient abdomen lower with severe pain exhibited by patient. Dilaudid 1 mg IV and 4 mg Zofran given. Pain improved. Abdomen slightly distended. Pain noted on lower abdomen. No rebound tenderness noted. Labs white count 11.2, hgb 11.3,HCT 33.6. CMP no metabolic or hepatic derangement. Lactic acid 2.10. fluid bolus provided. CT abdomen and pelvis with contrast ordered. Initial evaluation by myself shows air fluid levels consistent with bowel obstruction. CT shows a moderate to severe dilation of the cecum with narrowing proximally and distally findings are concerning for developing closed-loop obstruction in the right colon. Dr. Isha Ruiz from general surgery was notified. CRITICAL CARE:       CONSULTS:  General surgery    PROCEDURES:  None    FINAL IMPRESSION      1. Complete intestinal obstruction, unspecified cause ([facility] [address]          DISPOSITION/PLAN   Admit. PATIENT REFERRED TO:  No follow-up provider specified.     DISCHARGE MEDICATIONS:  New Prescriptions    No medications on file       (Please note that portions of this note were completed with a voice recognition program.  Efforts were made to edit the dictations but occasionally words are mis-transcribed.)    MD Giovani Dennis MD  12/05/22 5190

## 2022-12-05 NOTE — PROGRESS NOTES
1428- Pt to PACU. S/P Bowel resection/appy DEB Strongey to gravity, ABD binder and ice pack. Grimacing and moaning. Unable to get pain to a manageable level. 1430- 0.25mg dilaudid  1435- 0.25mg dilaudid  1440- 50mcg fentanyl  1445- 50mcg fentanyl  1450- 0.5mg dilaudid   1455- 0.5mg dilaudid   1456- 0.5mg versed  1505- 0.5mg dilaudid     1515- Pt resting with eyes closed, no acute distress noted.  VSS on 2LNC    1525- Pt met PACU discharge criteria, called report to [facility]

## 2022-12-05 NOTE — ED TRIAGE NOTES
Patient reports, 'severe abdominal pain. Started earlier today, nausea and vomiting. Last ate at 1615 a salad, bowel movement normal. Took a flexeril a 1830 I thought it was a muscle spasm. \" Observed patient bent over holding stomach. Patient in gown and placed in a comfortable position.

## 2022-12-05 NOTE — ANESTHESIA PRE PROCEDURE
Department of Anesthesiology  Preprocedure Note       Name:  Gwen Wilkes   Age:  81 y.o.  :  1950                                          MRN:  [id number]         Date:  2022      Surgeon: Charlee Rosado):  Larisa Hu MD    Procedure: Procedure(s):  EXPLORATORY LAPAROTOMY WITH BOWEL RESECTION    Medications prior to admission:   Prior to Admission medications    Medication Sig Start Date End Date Taking? Authorizing Provider   cyclobenzaprine (FLEXERIL) 10 MG tablet Take 10 mg by mouth 3 times daily as needed for Muscle spasms   Yes Historical Provider, MD   busPIRone (BUSPAR) 10 MG tablet Take 10 mg by mouth daily 22   Historical Provider, MD   traMADol (ULTRAM) 50 MG tablet Take 50 mg by mouth 2 times daily as needed.  22   Historical Provider, MD   Biotin 2500 MCG CAPS Take by mouth    Historical Provider, MD   vitamin B-12 (CYANOCOBALAMIN) 1000 MCG tablet Take 1,000 mcg by mouth daily  Patient not taking: Reported on 2022    Historical Provider, MD   levothyroxine (SYNTHROID) 25 MCG tablet Take 25 mcg by mouth Daily    Historical Provider, MD   meloxicam (MOBIC) 7.5 MG tablet Take 7.5 mg by mouth daily    Historical Provider, MD   metFORMIN (GLUCOPHAGE-XR) 500 MG extended release tablet Take 1,000 mg by mouth 2 times daily    Historical Provider, MD   hydroxychloroquine (PLAQUENIL) 200 MG tablet Take 300 mg by mouth daily  20   Historical Provider, MD   tolterodine (DETROL) 1 MG tablet Take 1 mg by mouth 2 times daily     Historical Provider, MD   Probiotic Product (PROBIOTIC DAILY PO) Take by mouth 2 times daily    Historical Provider, MD   atorvastatin (LIPITOR) 20 MG tablet Take 20 mg by mouth daily     Historical Provider, MD SOUZAITY 1.5 BF/9.7TK SOPN Inject 1.5 mg into the skin once a week     Historical Provider, MD   citalopram (CELEXA) 20 MG tablet Take 20 mg by mouth daily     Historical Provider, MD   Multiple Vitamins-Minerals (THERAPEUTIC MULTIVITAMIN-MINERALS) tablet Take 1 tablet by mouth daily    Historical Provider, MD   lisinopril (PRINIVIL;ZESTRIL) 2.5 MG tablet Take 2.5 mg by mouth daily     Historical Provider, MD       Current medications:    Current Facility-Administered Medications   Medication Dose Route Frequency Provider Last Rate Last Admin    sodium chloride flush 0.9 % injection 5-40 mL  5-40 mL IntraVENous 2 times per day Juliano Bottom, APRN - CNP        sodium chloride flush 0.9 % injection 5-40 mL  5-40 mL IntraVENous PRN Juliano Bottom, APRN - CNP        0.9 % sodium chloride infusion   IntraVENous PRN Juliano Bottom, APRN - CNP        ondansetron (ZOFRAN-ODT) disintegrating tablet 4 mg  4 mg Oral Q8H PRN Juliano Bottom, APRN - CNP        Or    ondansetron (ZOFRAN) injection 4 mg  4 mg IntraVENous Q6H PRN Juliano Bottom, APRN - CNP        enoxaparin (LOVENOX) injection 40 mg  40 mg SubCUTAneous Daily Juliano Bottom, APRN - CNP        0.9 % sodium chloride infusion   IntraVENous Continuous Namrata Lenz  mL/hr at 12/05/22 0630 Rate Verify at 12/05/22 0630    acetaminophen (TYLENOL) tablet 650 mg  650 mg Oral Q4H PRN Juliano Bottom, APRN - CNP        HYDROmorphone (DILAUDID) injection 0.25 mg  0.25 mg IntraVENous Q3H PRN Juliano Bottom, APRN - CNP   0.25 mg at 12/05/22 0539    Or    HYDROmorphone (DILAUDID) injection 0.5 mg  0.5 mg IntraVENous Q3H PRN Juliano Bottom, APRN - CNP        pantoprazole (PROTONIX) 40 mg in sodium chloride (PF) 0.9 % 10 mL injection  40 mg IntraVENous Daily Juliano Bottom, APRN - CNP   40 mg at 12/05/22 3852    insulin lispro (HUMALOG) injection vial 0-4 Units  0-4 Units SubCUTAneous TID WC Juliano Bottom, APRN - CNP        insulin lispro (HUMALOG) injection vial 0-4 Units  0-4 Units SubCUTAneous Nightly Juliano Bottom, APRN - CNP        glucose chewable tablet 16 g  4 tablet Oral PRN Juliano Bottom, APRN - CNP        dextrose bolus 10% 125 mL  125 mL IntraVENous PRN Juliano Bottom, APRN - CNP        Or    dextrose bolus 10% 250 mL  250 mL IntraVENous PRN Maryan Bound, APRN - CNP        glucagon (rDNA) injection 1 mg  1 mg SubCUTAneous PRN Maryan Bound, APRN - CNP        dextrose 10 % infusion   IntraVENous Continuous PRN Maryan Bound, APRN - CNP        magnesium sulfate 2000 mg in 50 mL IVPB premix  2,000 mg IntraVENous PRN Maryan Bound, APRN - CNP        potassium chloride (KLOR-CON M) extended release tablet 40 mEq  40 mEq Oral PRN Maryan Bound, APRN - CNP        Or    potassium bicarb-citric acid (EFFER-K) effervescent tablet 40 mEq  40 mEq Oral PRN Maryan Bound, APRN - CNP        Or    potassium chloride 10 mEq/100 mL IVPB (Peripheral Line)  10 mEq IntraVENous PRN Maryan Bound, APRN - CNP        piperacillin-tazobactam (ZOSYN) 3,375 mg in dextrose 5 % 50 mL IVPB extended infusion (mini-bag)  3,375 mg IntraVENous Q8H Beverly Lanes, MD   3,375 mg at 12/05/22 1300    piperacillin-tazobactam (ZOSYN) 3,375 mg in dextrose 5 % 50 mL IVPB (mini-bag)  3,375 mg IntraVENous On Call to [facility] Jacy Ohara MD   Held at 12/05/22 0829     Facility-Administered Medications Ordered in Other Encounters   Medication Dose Route Frequency Provider Last Rate Last Admin    lidocaine PF 2 % injection   IntraVENous PRN Yair Lis, DO   60 mg at 12/05/22 1251    fentaNYL (SUBLIMAZE) injection   IntraVENous PRN Yair Lis, DO   50 mcg at 12/05/22 1251    lactated ringers infusion   IntraVENous Continuous PRN Yair Lis, DO   New Bag at 12/05/22 1245       Allergies:  No Known Allergies    Problem List:    Patient Active Problem List   Diagnosis Code    Chest pain R07.9    Hypertension I10    Diabetes ([facility] [address] E11.9    Nasal bone fracture S02. 2XXA    Nasal trauma S09. 92XA    Cellulitis of external nose J34.0    Angina at rest ([facility] I20.8    Bursitis of right shoulder M75.51    Carpal tunnel syndrome of right wrist G56.01    Complete tear of left rotator cuff M75.122    Impingement syndrome of shoulder M75.40    Localized, primary osteoarthritis of shoulder region M19.019    Loss of feeling or sensation R20.0    Partial small bowel obstruction (HCC) K56.600       Past Medical History:        Diagnosis Date    Anxiety     Blood transfusion     Diabetes mellitus ([address]     Diverticulosis of colon     Hyperlipidemia     Hypertension     Localized, primary osteoarthritis of shoulder region 8/14/2019    Nausea & vomiting        Past Surgical History:        Procedure Laterality Date    CARPAL TUNNEL RELEASE Right     CHOLECYSTECTOMY      COLONOSCOPY      ENDOSCOPY, COLON, DIAGNOSTIC      FOOT SURGERY Bilateral 2004    HYSTERECTOMY (CERVIX STATUS UNKNOWN)  1996    JOINT REPLACEMENT Right 2009 2018    knee    LITHOTRIPSY      MECKEL DIVERTICULUM EXCISION      diverticulosis    SHOULDER SURGERY Left 1992       Social History:    Social History     Tobacco Use    Smoking status: Never    Smokeless tobacco: Never   Substance Use Topics    Alcohol use: Yes     Comment: rarely                                Counseling given: Not Answered      Vital Signs (Current):   Vitals:    12/05/22 0400 12/05/22 0539 12/05/22 0609 12/05/22 0921   BP: (!) 112/56   126/60   Pulse: 97   86   Resp: 16 16 16 16   Temp: 98.2 °F (36.8 °C)   97.6 °F (36.4 °C)   TempSrc: Oral   Oral   SpO2: 97%   100%   Weight:       Height:                                                  BP Readings from Last 3 Encounters:   12/05/22 126/60   02/27/22 (!) 123/57   12/15/21 (!) 100/47       NPO Status:                                                                                 BMI:   Wt Readings from Last 3 Encounters:   12/04/22 116 lb (52.6 kg)   02/27/22 116 lb (52.6 kg)   12/15/21 117 lb (53.1 kg)     Body mass index is 23.43 kg/m².     CBC:   Lab Results   Component Value Date/Time    WBC 8.7 12/05/2022 05:54 AM    RBC 3.46 12/05/2022 05:54 AM    HGB 10.8 12/05/2022 05:54 AM    HCT 32.9 12/05/2022 05:54 AM discussed with patient and spouse. Crow Langley.  DO Yusuf   12/5/2022

## 2022-12-05 NOTE — ED NOTES
Report called to [address] [place] [address], 2353 given to [employer].       Gustavo Barney RN  12/05/22 9509

## 2022-12-05 NOTE — ED NOTES
[facility] arrived, introduced to patient. Observed patient resp easy, laying in bed, appears comfortable. Patient related, pain \"3/10 and she was comfortable. \" EMT Gwyn Oar at bedside, report and paperwork provided. Patient requested to walk from bed to stretcher. Patient steady in ambulation. IV NS infusing without swelling or pain. Patient provided with warm blankets.       Stephan Rodrigues RN  12/05/22 3150

## 2022-12-06 PROBLEM — K56.601 COMPLETE SMALL BOWEL OBSTRUCTION (HCC): Status: ACTIVE | Noted: 2022-12-06

## 2022-12-06 LAB
ANION GAP SERPL CALCULATED.3IONS-SCNC: 9 MEQ/L (ref 8–16)
BASOPHILS # BLD: 0.1 %
BASOPHILS ABSOLUTE: 0 THOU/MM3 (ref 0–0.1)
BUN BLDV-MCNC: 18 MG/DL (ref 7–22)
CALCIUM SERPL-MCNC: 8.5 MG/DL (ref 8.5–10.5)
CHLORIDE BLD-SCNC: 104 MEQ/L (ref 98–111)
CO2: 24 MEQ/L (ref 23–33)
CREAT SERPL-MCNC: 0.8 MG/DL (ref 0.4–1.2)
EOSINOPHIL # BLD: 0 %
EOSINOPHILS ABSOLUTE: 0 THOU/MM3 (ref 0–0.4)
ERYTHROCYTE [DISTWIDTH] IN BLOOD BY AUTOMATED COUNT: 13.4 % (ref 11.5–14.5)
ERYTHROCYTE [DISTWIDTH] IN BLOOD BY AUTOMATED COUNT: 47.7 FL (ref 35–45)
GFR SERPL CREATININE-BSD FRML MDRD: > 60 ML/MIN/1.73M2
GLUCOSE BLD-MCNC: 110 MG/DL (ref 70–108)
GLUCOSE BLD-MCNC: 131 MG/DL (ref 70–108)
GLUCOSE BLD-MCNC: 146 MG/DL (ref 70–108)
GLUCOSE BLD-MCNC: 154 MG/DL (ref 70–108)
HCT VFR BLD CALC: 34.6 % (ref 37–47)
HEMOGLOBIN: 11.3 GM/DL (ref 12–16)
IMMATURE GRANS (ABS): 0.05 THOU/MM3 (ref 0–0.07)
IMMATURE GRANULOCYTES: 0.3 %
LYMPHOCYTES # BLD: 4.7 %
LYMPHOCYTES ABSOLUTE: 0.7 THOU/MM3 (ref 1–4.8)
MCH RBC QN AUTO: 31.4 PG (ref 26–33)
MCHC RBC AUTO-ENTMCNC: 32.7 GM/DL (ref 32.2–35.5)
MCV RBC AUTO: 96.1 FL (ref 81–99)
MONOCYTES # BLD: 5.5 %
MONOCYTES ABSOLUTE: 0.8 THOU/MM3 (ref 0.4–1.3)
NUCLEATED RED BLOOD CELLS: 0 /100 WBC
PLATELET # BLD: 261 THOU/MM3 (ref 130–400)
PMV BLD AUTO: 10.2 FL (ref 9.4–12.4)
POTASSIUM REFLEX MAGNESIUM: 4.7 MEQ/L (ref 3.5–5.2)
RBC # BLD: 3.6 MILL/MM3 (ref 4.2–5.4)
SEG NEUTROPHILS: 89.4 %
SEGMENTED NEUTROPHILS ABSOLUTE COUNT: 13.6 THOU/MM3 (ref 1.8–7.7)
SODIUM BLD-SCNC: 137 MEQ/L (ref 135–145)
WBC # BLD: 15.2 THOU/MM3 (ref 4.8–10.8)

## 2022-12-06 PROCEDURE — 99024 POSTOP FOLLOW-UP VISIT: CPT | Performed by: SURGERY

## 2022-12-06 PROCEDURE — 6360000002 HC RX W HCPCS

## 2022-12-06 PROCEDURE — 1200000000 HC SEMI PRIVATE

## 2022-12-06 PROCEDURE — C9113 INJ PANTOPRAZOLE SODIUM, VIA: HCPCS | Performed by: NURSE PRACTITIONER

## 2022-12-06 PROCEDURE — 85025 COMPLETE CBC W/AUTO DIFF WBC: CPT

## 2022-12-06 PROCEDURE — 6360000002 HC RX W HCPCS: Performed by: SURGERY

## 2022-12-06 PROCEDURE — 2580000003 HC RX 258: Performed by: NURSE PRACTITIONER

## 2022-12-06 PROCEDURE — 82948 REAGENT STRIP/BLOOD GLUCOSE: CPT

## 2022-12-06 PROCEDURE — 2580000003 HC RX 258: Performed by: SURGERY

## 2022-12-06 PROCEDURE — 80048 BASIC METABOLIC PNL TOTAL CA: CPT

## 2022-12-06 PROCEDURE — A4216 STERILE WATER/SALINE, 10 ML: HCPCS | Performed by: NURSE PRACTITIONER

## 2022-12-06 PROCEDURE — 6360000002 HC RX W HCPCS: Performed by: NURSE PRACTITIONER

## 2022-12-06 PROCEDURE — 6370000000 HC RX 637 (ALT 250 FOR IP)

## 2022-12-06 PROCEDURE — 36415 COLL VENOUS BLD VENIPUNCTURE: CPT

## 2022-12-06 RX ORDER — ENOXAPARIN SODIUM 100 MG/ML
40 INJECTION SUBCUTANEOUS DAILY
Status: DISCONTINUED | OUTPATIENT
Start: 2022-12-07 | End: 2022-12-10 | Stop reason: HOSPADM

## 2022-12-06 RX ORDER — INSULIN LISPRO 100 [IU]/ML
0-4 INJECTION, SOLUTION INTRAVENOUS; SUBCUTANEOUS NIGHTLY
Status: DISCONTINUED | OUTPATIENT
Start: 2022-12-06 | End: 2022-12-10 | Stop reason: HOSPADM

## 2022-12-06 RX ORDER — KETOROLAC TROMETHAMINE 30 MG/ML
15 INJECTION, SOLUTION INTRAMUSCULAR; INTRAVENOUS EVERY 6 HOURS PRN
Status: DISCONTINUED | OUTPATIENT
Start: 2022-12-06 | End: 2022-12-10 | Stop reason: HOSPADM

## 2022-12-06 RX ORDER — INSULIN LISPRO 100 [IU]/ML
0-4 INJECTION, SOLUTION INTRAVENOUS; SUBCUTANEOUS
Status: DISCONTINUED | OUTPATIENT
Start: 2022-12-06 | End: 2022-12-10 | Stop reason: HOSPADM

## 2022-12-06 RX ORDER — MORPHINE SULFATE 4 MG/ML
4 INJECTION, SOLUTION INTRAMUSCULAR; INTRAVENOUS
Status: DISCONTINUED | OUTPATIENT
Start: 2022-12-06 | End: 2022-12-10 | Stop reason: HOSPADM

## 2022-12-06 RX ORDER — MORPHINE SULFATE 2 MG/ML
2 INJECTION, SOLUTION INTRAMUSCULAR; INTRAVENOUS
Status: DISCONTINUED | OUTPATIENT
Start: 2022-12-06 | End: 2022-12-10 | Stop reason: HOSPADM

## 2022-12-06 RX ADMIN — PIPERACILLIN AND TAZOBACTAM 3375 MG: 3; .375 INJECTION, POWDER, FOR SOLUTION INTRAVENOUS at 21:06

## 2022-12-06 RX ADMIN — PIPERACILLIN AND TAZOBACTAM 3375 MG: 3; .375 INJECTION, POWDER, FOR SOLUTION INTRAVENOUS at 05:13

## 2022-12-06 RX ADMIN — PIPERACILLIN AND TAZOBACTAM 3375 MG: 3; .375 INJECTION, POWDER, FOR SOLUTION INTRAVENOUS at 12:35

## 2022-12-06 RX ADMIN — KETOROLAC TROMETHAMINE 15 MG: 30 INJECTION, SOLUTION INTRAMUSCULAR; INTRAVENOUS at 14:54

## 2022-12-06 RX ADMIN — SODIUM CHLORIDE 40 MG: 9 INJECTION, SOLUTION INTRAMUSCULAR; INTRAVENOUS; SUBCUTANEOUS at 07:59

## 2022-12-06 RX ADMIN — HYDROMORPHONE HYDROCHLORIDE 1 MG: 1 INJECTION, SOLUTION INTRAMUSCULAR; INTRAVENOUS; SUBCUTANEOUS at 09:23

## 2022-12-06 RX ADMIN — MORPHINE SULFATE 2 MG: 2 INJECTION, SOLUTION INTRAMUSCULAR; INTRAVENOUS at 18:35

## 2022-12-06 RX ADMIN — KETOROLAC TROMETHAMINE 15 MG: 30 INJECTION, SOLUTION INTRAMUSCULAR; INTRAVENOUS at 08:53

## 2022-12-06 RX ADMIN — MORPHINE SULFATE 2 MG: 2 INJECTION, SOLUTION INTRAMUSCULAR; INTRAVENOUS at 23:42

## 2022-12-06 RX ADMIN — HYDROMORPHONE HYDROCHLORIDE 1 MG: 1 INJECTION, SOLUTION INTRAMUSCULAR; INTRAVENOUS; SUBCUTANEOUS at 03:20

## 2022-12-06 RX ADMIN — HYDROMORPHONE HYDROCHLORIDE 1 MG: 1 INJECTION, SOLUTION INTRAMUSCULAR; INTRAVENOUS; SUBCUTANEOUS at 06:28

## 2022-12-06 RX ADMIN — MORPHINE SULFATE 4 MG: 4 INJECTION, SOLUTION INTRAMUSCULAR; INTRAVENOUS at 16:32

## 2022-12-06 RX ADMIN — SODIUM CHLORIDE: 9 INJECTION, SOLUTION INTRAVENOUS at 21:01

## 2022-12-06 RX ADMIN — MORPHINE SULFATE 4 MG: 4 INJECTION, SOLUTION INTRAMUSCULAR; INTRAVENOUS at 12:30

## 2022-12-06 RX ADMIN — SODIUM CHLORIDE, PRESERVATIVE FREE 5 ML: 5 INJECTION INTRAVENOUS at 21:08

## 2022-12-06 RX ADMIN — NALOXEGOL OXALATE 12.5 MG: 12.5 TABLET, FILM COATED ORAL at 10:59

## 2022-12-06 RX ADMIN — ENOXAPARIN SODIUM 40 MG: 100 INJECTION SUBCUTANEOUS at 07:54

## 2022-12-06 RX ADMIN — MORPHINE SULFATE 4 MG: 4 INJECTION, SOLUTION INTRAMUSCULAR; INTRAVENOUS at 14:29

## 2022-12-06 RX ADMIN — SODIUM CHLORIDE, PRESERVATIVE FREE 10 ML: 5 INJECTION INTRAVENOUS at 08:02

## 2022-12-06 RX ADMIN — MORPHINE SULFATE 2 MG: 2 INJECTION, SOLUTION INTRAMUSCULAR; INTRAVENOUS at 20:34

## 2022-12-06 RX ADMIN — KETOROLAC TROMETHAMINE 15 MG: 30 INJECTION, SOLUTION INTRAMUSCULAR; INTRAVENOUS at 21:02

## 2022-12-06 ASSESSMENT — PAIN DESCRIPTION - LOCATION
LOCATION: ABDOMEN

## 2022-12-06 ASSESSMENT — PAIN SCALES - GENERAL
PAINLEVEL_OUTOF10: 5
PAINLEVEL_OUTOF10: 10
PAINLEVEL_OUTOF10: 10
PAINLEVEL_OUTOF10: 4
PAINLEVEL_OUTOF10: 2
PAINLEVEL_OUTOF10: 6
PAINLEVEL_OUTOF10: 7
PAINLEVEL_OUTOF10: 10
PAINLEVEL_OUTOF10: 3
PAINLEVEL_OUTOF10: 10
PAINLEVEL_OUTOF10: 6
PAINLEVEL_OUTOF10: 5
PAINLEVEL_OUTOF10: 6
PAINLEVEL_OUTOF10: 1
PAINLEVEL_OUTOF10: 10
PAINLEVEL_OUTOF10: 7

## 2022-12-06 ASSESSMENT — PAIN DESCRIPTION - ORIENTATION: ORIENTATION: UPPER;MID;LOWER

## 2022-12-06 ASSESSMENT — PAIN DESCRIPTION - DESCRIPTORS
DESCRIPTORS: SHARP
DESCRIPTORS: STABBING
DESCRIPTORS: THROBBING
DESCRIPTORS: THROBBING
DESCRIPTORS: SHARP
DESCRIPTORS: SHARP
DESCRIPTORS: ACHING
DESCRIPTORS: STABBING

## 2022-12-06 ASSESSMENT — PAIN DESCRIPTION - PAIN TYPE: TYPE: SURGICAL PAIN

## 2022-12-06 ASSESSMENT — PAIN DESCRIPTION - FREQUENCY: FREQUENCY: CONTINUOUS

## 2022-12-06 ASSESSMENT — PAIN - FUNCTIONAL ASSESSMENT: PAIN_FUNCTIONAL_ASSESSMENT: PREVENTS OR INTERFERES SOME ACTIVE ACTIVITIES AND ADLS

## 2022-12-06 NOTE — PROGRESS NOTES
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 PLAN   Analgesics and antiemetics as needed -- toradol 15 mg IV q6h prn pain not well controlled. Change Dilaudid to morphine as well. Denies muscle spasms. IV hydration  NPO with ice chips as tolerated  Will start on [employer]  Encourage incentive spirometry  Repeat CBC and BMP in AM  Consider putting NGT today based on patient's improvement today  Increase activity  Lovenox for DVT prophylaxis  Jessy Fischer is doing well. She denies any nausea or vomiting, has not passed flatus or had a bowel movement. Reports incisional pain. CURRENT MEDICATIONS   Scheduled Meds:   [START ON 12/7/2022] enoxaparin  40 mg SubCUTAneous Daily    piperacillin-tazobactam (ZOSYN) 3375 mg in dextrose 5% IVPB extended infusion (mini-bag)  3,375 mg IntraVENous Q8H    insulin lispro  0-4 Units SubCUTAneous TID WC    insulin lispro  0-4 Units SubCUTAneous Nightly    naloxegol  12.5 mg Oral QAM AC    sodium chloride flush  5-40 mL IntraVENous 2 times per day    pantoprazole (PROTONIX) 40 mg injection  40 mg IntraVENous Daily     Continuous Infusions:   sodium chloride      sodium chloride 100 mL/hr at 12/05/22 2023    dextrose       PRN Meds:. HYDROmorphone **OR** HYDROmorphone, ketorolac, phenol, sodium chloride flush, sodium chloride, ondansetron **OR** ondansetron, acetaminophen, glucose, dextrose bolus **OR** dextrose bolus, glucagon (rDNA), dextrose, magnesium sulfate, potassium chloride **OR** potassium alternative oral replacement **OR** potassium chloride  OBJECTIVE   CURRENT VITALS:  height is 4' 11\" (1.499 m) and weight is 116 lb (52.6 kg). Her oral temperature is 98.4 °F (36.9 °C). Her blood pressure is 140/70 (abnormal) and her pulse is 99. Her respiration is 18 and oxygen saturation is 95%. Temperature Range (24h):Temp: 98.4 °F (36.9 °C) Temp  Av.4 °F (36.9 °C)  Min: 97.8 °F (36.6 °C)  Max: 98.8 °F (37.1 °C)  BP Range (31F): Systolic (34EKM), JTP:750 , Min:100 , IJM:569     Diastolic (60MNS), SKX:02, Min:52, Max:78    Pulse Range (24h): Pulse  Av.6  Min: 96  Max: 109  Respiration Range (24h): Resp  Av.1  Min: 10  Max: 24  Current Pulse Ox (24h):  SpO2: 95 %  Pulse Ox Range (24h):  SpO2  Av.6 %  Min: 89 %  Max: 100 %  Oxygen Amount and Delivery: O2 Flow Rate (L/min): 2 L/min  Incentive Spirometry Tx:            GENERAL: alert, no distress  LUNGS: clear to ausculation, without wheezes, rales or rhonci  HEART: normal rate and regular rhythm  ABDOMEN: non-distended, bowel sounds absent, abdominal tenderness upon palpation, and no guarding or peritoneal signs. Bandage dressing on left abdomen. INCISION: healing well, no significant drainage, no significant erythema  EXTREMITY: no cyanosis, clubbing or edema  In: 1550 [P.O.:200; I.V.:1300]  Out: 100        LABS     Recent Labs     22  2140 22  0554 22  0544   WBC 11.2* 8.7 15.2*   HGB 11.3* 10.8* 11.3*   HCT 33.6* 32.9* 34.6*    250 261    141 137   K 3.8 4.5 4.7    106 104   CO2 24 23 24   BUN 18 16 18   CREATININE 0.8 0.7 0.8   MG 1.0*  --   --    CALCIUM  --  9.4 8.5      No results for input(s): PTT, INR in the last 72 hours. Invalid input(s): PT  Recent Labs     220 22  0554 22  1136   AST 31  --   --    ALT 44  --   --    BILITOT 0.3  --   --    LACTA  --  1.0 1.1     No results for input(s): TROPONINT in the last 72 hours.   RADIOLOGY     CT ABDOMEN PELVIS W IV CONTRAST Additional Contrast? Radiologist Recommendation (iv only)   Final Result   Impression:      Moderate to severe dilation cecum with narrowing proximally and distally      Findings concerning for developing closed-loop obstruction in the right    colon      This document has been electronically signed by: Mindi Montenegro MD on    12/04/2022 11:29 PM      All CTs at this facility use dose modulation techniques and iterative    reconstructions, and/or weight-based dosing   when appropriate to reduce radiation to a low as reasonably achievable. Electronically signed by Neelima Brown DO on 12/6/2022 at 10:15 AM  Patient seen with resident. Patient complains of abdominal pain. Her abdomen is soft incisional tenderness. Toradol added and medications changed. Repeat labs in AM.  NG tube output is bilious and minimal.  Consider discontinuing later today.   Repeat labs in AM.

## 2022-12-06 NOTE — PLAN OF CARE
Problem: Discharge Planning  Goal: Discharge to home or other facility with appropriate resources  Outcome: Progressing     Problem: Pain  Goal: Verbalizes/displays adequate comfort level or baseline comfort level  Outcome: Progressing     Problem: Chronic Conditions and Co-morbidities  Goal: Patient's chronic conditions and co-morbidity symptoms are monitored and maintained or improved  Outcome: Progressing   Care plan reviewed with patient. Patient  verbalize understanding of the plan of care and contribute to goal setting.

## 2022-12-06 NOTE — CARE COORDINATION
12/6/22, 12:12 PM EST    DISCHARGE ON GOING EVALUATION    Salma Templeton day: 1  Location: 8A-14/014-A Reason for admit: Partial small bowel obstruction ([address] [K56.600]  Complete intestinal obstruction, unspecified cause ([address] [K56.601]  Complete small bowel obstruction ([address] [K56.601]   Procedure: 12/4 CT Abdomen Pelvis W IV Contrast Moderate to severe dilation cecum with narrowing proximally and distally     Findings concerning for developing closed-loop obstruction in the right   Colon     The Plan for Transition of Care is related to the following treatment goals of Partial small bowel obstruction ([address] [K56.600]  Complete intestinal obstruction, unspecified cause ([address] [K56.601]    12-5-22  Right colon resection with removal of terminal ileum    Barriers to Discharge: POD #1. Dilaudid and Toradol for pain. Dilaudid to be changed to Morphine. Cont IV hydration. Ice chips as tolerated. No BM or flatus yet, Movantik. Resp exercise. Encourage ambulation. PCP: KARLA Wilcox CNP  Readmission Risk Score: 8.9%  Patient Goals/Plan/Treatment Preferences: Plans to return home with spouse at discharge. CM to cont to follow for possible needs.

## 2022-12-07 LAB
ANION GAP SERPL CALCULATED.3IONS-SCNC: 12 MEQ/L (ref 8–16)
BASOPHILS # BLD: 0.2 %
BASOPHILS ABSOLUTE: 0 THOU/MM3 (ref 0–0.1)
BUN BLDV-MCNC: 23 MG/DL (ref 7–22)
CALCIUM SERPL-MCNC: 8 MG/DL (ref 8.5–10.5)
CHLORIDE BLD-SCNC: 107 MEQ/L (ref 98–111)
CO2: 22 MEQ/L (ref 23–33)
CREAT SERPL-MCNC: 0.9 MG/DL (ref 0.4–1.2)
EOSINOPHIL # BLD: 0.6 %
EOSINOPHILS ABSOLUTE: 0.1 THOU/MM3 (ref 0–0.4)
ERYTHROCYTE [DISTWIDTH] IN BLOOD BY AUTOMATED COUNT: 13.6 % (ref 11.5–14.5)
ERYTHROCYTE [DISTWIDTH] IN BLOOD BY AUTOMATED COUNT: 48 FL (ref 35–45)
GFR SERPL CREATININE-BSD FRML MDRD: > 60 ML/MIN/1.73M2
GLUCOSE BLD-MCNC: 110 MG/DL (ref 70–108)
GLUCOSE BLD-MCNC: 170 MG/DL (ref 70–108)
GLUCOSE BLD-MCNC: 71 MG/DL (ref 70–108)
GLUCOSE BLD-MCNC: 86 MG/DL (ref 70–108)
GLUCOSE BLD-MCNC: 93 MG/DL (ref 70–108)
HCT VFR BLD CALC: 28.2 % (ref 37–47)
HCT VFR BLD CALC: 28.2 % (ref 37–47)
HEMOGLOBIN: 9.3 GM/DL (ref 12–16)
HEMOGLOBIN: 9.3 GM/DL (ref 12–16)
IMMATURE GRANS (ABS): 0.03 THOU/MM3 (ref 0–0.07)
IMMATURE GRANULOCYTES: 0.3 %
LYMPHOCYTES # BLD: 12.8 %
LYMPHOCYTES ABSOLUTE: 1.2 THOU/MM3 (ref 1–4.8)
MCH RBC QN AUTO: 31.6 PG (ref 26–33)
MCHC RBC AUTO-ENTMCNC: 33 GM/DL (ref 32.2–35.5)
MCV RBC AUTO: 95.9 FL (ref 81–99)
MONOCYTES # BLD: 2.7 %
MONOCYTES ABSOLUTE: 0.3 THOU/MM3 (ref 0.4–1.3)
NUCLEATED RED BLOOD CELLS: 0 /100 WBC
PLATELET # BLD: 197 THOU/MM3 (ref 130–400)
PMV BLD AUTO: 10.3 FL (ref 9.4–12.4)
POTASSIUM REFLEX MAGNESIUM: 4.1 MEQ/L (ref 3.5–5.2)
RBC # BLD: 2.94 MILL/MM3 (ref 4.2–5.4)
SEG NEUTROPHILS: 83.4 %
SEGMENTED NEUTROPHILS ABSOLUTE COUNT: 8.1 THOU/MM3 (ref 1.8–7.7)
SODIUM BLD-SCNC: 141 MEQ/L (ref 135–145)
WBC # BLD: 9.7 THOU/MM3 (ref 4.8–10.8)

## 2022-12-07 PROCEDURE — 85018 HEMOGLOBIN: CPT

## 2022-12-07 PROCEDURE — 85014 HEMATOCRIT: CPT

## 2022-12-07 PROCEDURE — 1200000000 HC SEMI PRIVATE

## 2022-12-07 PROCEDURE — 80048 BASIC METABOLIC PNL TOTAL CA: CPT

## 2022-12-07 PROCEDURE — C9113 INJ PANTOPRAZOLE SODIUM, VIA: HCPCS | Performed by: SURGERY

## 2022-12-07 PROCEDURE — 6360000002 HC RX W HCPCS

## 2022-12-07 PROCEDURE — 6360000002 HC RX W HCPCS: Performed by: SURGERY

## 2022-12-07 PROCEDURE — 2580000003 HC RX 258: Performed by: SURGERY

## 2022-12-07 PROCEDURE — 36415 COLL VENOUS BLD VENIPUNCTURE: CPT

## 2022-12-07 PROCEDURE — 99024 POSTOP FOLLOW-UP VISIT: CPT | Performed by: SURGERY

## 2022-12-07 PROCEDURE — 82948 REAGENT STRIP/BLOOD GLUCOSE: CPT

## 2022-12-07 PROCEDURE — A4216 STERILE WATER/SALINE, 10 ML: HCPCS | Performed by: SURGERY

## 2022-12-07 PROCEDURE — 85025 COMPLETE CBC W/AUTO DIFF WBC: CPT

## 2022-12-07 PROCEDURE — 6370000000 HC RX 637 (ALT 250 FOR IP)

## 2022-12-07 RX ADMIN — MORPHINE SULFATE 2 MG: 2 INJECTION, SOLUTION INTRAMUSCULAR; INTRAVENOUS at 02:19

## 2022-12-07 RX ADMIN — KETOROLAC TROMETHAMINE 15 MG: 30 INJECTION, SOLUTION INTRAMUSCULAR; INTRAVENOUS at 03:32

## 2022-12-07 RX ADMIN — MORPHINE SULFATE 2 MG: 2 INJECTION, SOLUTION INTRAMUSCULAR; INTRAVENOUS at 20:25

## 2022-12-07 RX ADMIN — KETOROLAC TROMETHAMINE 15 MG: 30 INJECTION, SOLUTION INTRAMUSCULAR; INTRAVENOUS at 13:14

## 2022-12-07 RX ADMIN — SODIUM CHLORIDE: 9 INJECTION, SOLUTION INTRAVENOUS at 11:23

## 2022-12-07 RX ADMIN — SODIUM CHLORIDE 40 MG: 9 INJECTION, SOLUTION INTRAMUSCULAR; INTRAVENOUS; SUBCUTANEOUS at 09:44

## 2022-12-07 RX ADMIN — KETOROLAC TROMETHAMINE 15 MG: 30 INJECTION, SOLUTION INTRAMUSCULAR; INTRAVENOUS at 20:25

## 2022-12-07 RX ADMIN — NALOXEGOL OXALATE 12.5 MG: 12.5 TABLET, FILM COATED ORAL at 06:24

## 2022-12-07 RX ADMIN — MORPHINE SULFATE 2 MG: 2 INJECTION, SOLUTION INTRAMUSCULAR; INTRAVENOUS at 14:30

## 2022-12-07 RX ADMIN — SODIUM CHLORIDE, PRESERVATIVE FREE 5 ML: 5 INJECTION INTRAVENOUS at 20:23

## 2022-12-07 RX ADMIN — MORPHINE SULFATE 2 MG: 2 INJECTION, SOLUTION INTRAMUSCULAR; INTRAVENOUS at 06:18

## 2022-12-07 ASSESSMENT — PAIN DESCRIPTION - ORIENTATION: ORIENTATION: ANTERIOR

## 2022-12-07 ASSESSMENT — PAIN SCALES - GENERAL
PAINLEVEL_OUTOF10: 4
PAINLEVEL_OUTOF10: 8
PAINLEVEL_OUTOF10: 3
PAINLEVEL_OUTOF10: 6
PAINLEVEL_OUTOF10: 3
PAINLEVEL_OUTOF10: 8
PAINLEVEL_OUTOF10: 4

## 2022-12-07 ASSESSMENT — PAIN DESCRIPTION - ONSET: ONSET: ON-GOING

## 2022-12-07 ASSESSMENT — PAIN DESCRIPTION - LOCATION
LOCATION: ABDOMEN

## 2022-12-07 ASSESSMENT — PAIN DESCRIPTION - DESCRIPTORS
DESCRIPTORS: ACHING
DESCRIPTORS: ACHING

## 2022-12-07 ASSESSMENT — PAIN DESCRIPTION - FREQUENCY: FREQUENCY: CONTINUOUS

## 2022-12-07 ASSESSMENT — PAIN DESCRIPTION - PAIN TYPE: TYPE: SURGICAL PAIN

## 2022-12-07 NOTE — PLAN OF CARE
Problem: Discharge Planning  Goal: Discharge to home or other facility with appropriate resources  12/7/2022 1053 by Jose C Sawyer RN  Outcome: Progressing  Flowsheets (Taken 12/5/2022 0218 by Roberto Stewart, RN)  Discharge to home or other facility with appropriate resources:   Identify barriers to discharge with patient and caregiver   Arrange for needed discharge resources and transportation as appropriate   Identify discharge learning needs (meds, wound care, etc)   Refer to discharge planning if patient needs post-hospital services based on physician order or complex needs related to functional status, cognitive ability or social support system     Problem: Pain  Goal: Verbalizes/displays adequate comfort level or baseline comfort level  12/7/2022 1053 by Jose C Sawyer RN  Outcome: Progressing  Flowsheets (Taken 12/5/2022 0400 by Roberto Stewart, RN)  Verbalizes/displays adequate comfort level or baseline comfort level:   Encourage patient to monitor pain and request assistance   Assess pain using appropriate pain scale   Administer analgesics based on type and severity of pain and evaluate response     Problem: Chronic Conditions and Co-morbidities  Goal: Patient's chronic conditions and co-morbidity symptoms are monitored and maintained or improved  12/7/2022 1053 by Jose C Sawyer RN  Outcome: Progressing  Flowsheets (Taken 12/7/2022 1053)  Care Plan - Patient's Chronic Conditions and Co-Morbidity Symptoms are Monitored and Maintained or Improved: Monitor and assess patient's chronic conditions and comorbid symptoms for stability, deterioration, or improvement     Problem: ABCDS Injury Assessment  Goal: Absence of physical injury  Outcome: Progressing  Flowsheets (Taken 12/7/2022 1053)  Absence of Physical Injury: Implement safety measures based on patient assessment   Care plan reviewed with patient. Patient verbalize understanding of the plan of care and contribute to goal setting.

## 2022-12-07 NOTE — PLAN OF CARE
Problem: Discharge Planning  Goal: Discharge to home or other facility with appropriate resources  12/6/2022 2318 by Dorothea Hernandez RN  Outcome: Progressing  12/6/2022 1009 by Nolberto Beltran RN  Outcome: Progressing     Problem: Pain  Goal: Verbalizes/displays adequate comfort level or baseline comfort level  12/6/2022 2318 by Dorothea Hernandez RN  Outcome: Progressing  12/6/2022 1009 by Nolberto Beltran RN  Outcome: Progressing     Problem: Chronic Conditions and Co-morbidities  Goal: Patient's chronic conditions and co-morbidity symptoms are monitored and maintained or improved  12/6/2022 2318 by Dorothea Hernandez RN  Outcome: Progressing  12/6/2022 1009 by Nolberto Beltran RN  Outcome: Progressing   Care plan reviewed with patient. Patient verbalize understanding of the plan of care and contribute to goal setting.

## 2022-12-07 NOTE — CARE COORDINATION
12/7/22, 1:36 PM EST    DISCHARGE ON GOING EVALUATION    Laura Allen day: 2  Location: 8A-14/014-A Reason for admit: Partial small bowel obstruction ([address] [K56.600]  Complete intestinal obstruction, unspecified cause ([address] [K56.601]  Complete small bowel obstruction ([address] [K56.601]   Procedure: 12/4 CT Abdomen Pelvis W IV Contrast Moderate to severe dilation cecum with narrowing proximally and distally   Findings concerning for developing closed-loop obstruction in the right   Colon  12-5-22 12-5-22  Right colon resection with removal of terminal ileum    Barriers to Discharge: POD #2 . Control of pain improved. Taking clear liquids, decreased IVF. Movantik for bowel fx. Hgb dropped 2 pts since yesterday. Hold Lovenox until Hgb stabilizes. Order to remove conroy. PCP: KARLA Wray - CNP  Readmission Risk Score: 10%  Patient Goals/Plan/Treatment Preferences:  Plans to return home with spouse at discharge. CM to cont to follow for possible needs.

## 2022-12-07 NOTE — PROGRESS NOTES
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 Blood sugars well controlled  PLAN   Analgesics and antiemetics as needed -- toradol 15 mg IV q6h prn pain not well controlled. Change Dilaudid to morphine as well. Denies muscle spasms. Pain much better controlled today  IV hydration-decrease IV fluids  Liquid diet-clears  Continue Movantik  Encourage incentive spirometry  Follow hemoglobin. 2 g drop the patient is diuresing. Hold Lovenox until hemoglobin has stabilized. Tolerated removal of NG tube. Increase activity  Lovenox for DVT prophylaxis-hold today drop in hemoglobin  Remove Valencia catheter  Eva Puckett is doing well. She denies any nausea or vomiting, has not passed flatus or had a bowel movement. Pain much better controlled today. No flatus. Tolerating NG tube out.   Dressing dry  CURRENT MEDICATIONS   Scheduled Meds:   [Held by provider] enoxaparin  40 mg SubCUTAneous Daily    insulin lispro  0-4 Units SubCUTAneous TID WC    insulin lispro  0-4 Units SubCUTAneous Nightly    naloxegol  12.5 mg Oral QAM AC    sodium chloride flush  5-40 mL IntraVENous 2 times per day    pantoprazole (PROTONIX) 40 mg injection  40 mg IntraVENous Daily     Continuous Infusions:   sodium chloride      sodium chloride 100 mL/hr at 12/06/22 2101    dextrose       PRN Meds:.ketorolac, phenol, morphine **OR** morphine, sodium chloride flush, sodium chloride, ondansetron **OR** ondansetron, acetaminophen, glucose, dextrose bolus **OR** dextrose bolus, glucagon (rDNA), dextrose, magnesium sulfate, potassium chloride **OR** potassium alternative oral replacement **OR** potassium chloride  OBJECTIVE   CURRENT VITALS:  height is 4' 11\" (1.499 m) and weight is 116 lb (52.6 kg). Her oral temperature is 97.9 °F (36.6 °C). Her blood pressure is 113/53 (abnormal) and her pulse is 90. Her respiration is 16 and oxygen saturation is 95%. Temperature Range (24h):Temp: 97.9 °F (36.6 °C) Temp  Av.8 °F (37.1 °C)  Min: 97.9 °F (36.6 °C)  Max: 100 °F (37.8 °C)  BP Range (49O): Systolic (04REC), MPS:606 , Min:101 , ATW:812     Diastolic (97DBA), YHN:49, Min:53, Max:90    Pulse Range (24h): Pulse  Av  Min: 90  Max: 105  Respiration Range (24h): Resp  Av.5  Min: 16  Max: 18  Current Pulse Ox (24h):  SpO2: 95 %  Pulse Ox Range (24h):  SpO2  Av.8 %  Min: 95 %  Max: 100 %  Oxygen Amount and Delivery: O2 Flow Rate (L/min): 2 L/min  Incentive Spirometry Tx:            GENERAL: alert, no distress  LUNGS: clear to ausculation, without wheezes, rales or rhonci  HEART: normal rate and regular rhythm  ABDOMEN: non-distended, bowel sounds absent, abdominal tenderness upon palpation, and no guarding or peritoneal signs. Bandage dressing on left abdomen. INCISION: healing well, no significant drainage, no significant erythema  EXTREMITY: no cyanosis, clubbing or edema  In: -   Out: 1060 [Urine:1050]       LABS     Recent Labs     22  2140 22  0554 22  0544 22  0552   WBC 11.2* 8.7 15.2* 9.7   HGB 11.3* 10.8* 11.3* 9.3*   HCT 33.6* 32.9* 34.6* 28.2*    250 261 197    141 137 141   K 3.8 4.5 4.7 4.1    106 104 107   CO2 24 23 24 22*   BUN 18 16 18 23*   CREATININE 0.8 0.7 0.8 0.9   MG 1.0*  --   --   --    CALCIUM  --  9.4 8.5 8.0*      No results for input(s): PTT, INR in the last 72 hours.     Invalid input(s): PT  Recent Labs     22  2140 22  0554 22  1136   AST 31  --   --    ALT 44  --   -- BILITOT 0.3  --   --    LACTA  --  1.0 1.1     No results for input(s): TROPONINT in the last 72 hours. RADIOLOGY     CT ABDOMEN PELVIS W IV CONTRAST Additional Contrast? Radiologist Recommendation (iv only)   Final Result   Impression:      Moderate to severe dilation cecum with narrowing proximally and distally      Findings concerning for developing closed-loop obstruction in the right    colon      This document has been electronically signed by: Blanche Foreman MD on    12/04/2022 11:29 PM      All CTs at this facility use dose modulation techniques and iterative    reconstructions, and/or weight-based dosing   when appropriate to reduce radiation to a low as reasonably achievable.         Electronically signed by Joseph Montalvo MD on 12/7/2022 at 9:07 AM

## 2022-12-08 LAB
GLUCOSE BLD-MCNC: 126 MG/DL (ref 70–108)
GLUCOSE BLD-MCNC: 170 MG/DL (ref 70–108)
GLUCOSE BLD-MCNC: 185 MG/DL (ref 70–108)

## 2022-12-08 PROCEDURE — 99024 POSTOP FOLLOW-UP VISIT: CPT | Performed by: SURGERY

## 2022-12-08 PROCEDURE — 6370000000 HC RX 637 (ALT 250 FOR IP)

## 2022-12-08 PROCEDURE — C9113 INJ PANTOPRAZOLE SODIUM, VIA: HCPCS | Performed by: SURGERY

## 2022-12-08 PROCEDURE — 6360000002 HC RX W HCPCS: Performed by: SURGERY

## 2022-12-08 PROCEDURE — 1200000000 HC SEMI PRIVATE

## 2022-12-08 PROCEDURE — A4216 STERILE WATER/SALINE, 10 ML: HCPCS | Performed by: SURGERY

## 2022-12-08 PROCEDURE — 82948 REAGENT STRIP/BLOOD GLUCOSE: CPT

## 2022-12-08 PROCEDURE — 2580000003 HC RX 258: Performed by: SURGERY

## 2022-12-08 PROCEDURE — 6370000000 HC RX 637 (ALT 250 FOR IP): Performed by: SURGERY

## 2022-12-08 RX ORDER — OXYCODONE HYDROCHLORIDE AND ACETAMINOPHEN 5; 325 MG/1; MG/1
1 TABLET ORAL EVERY 4 HOURS PRN
Status: DISCONTINUED | OUTPATIENT
Start: 2022-12-08 | End: 2022-12-10 | Stop reason: HOSPADM

## 2022-12-08 RX ORDER — DIPHENHYDRAMINE HCL 25 MG
25 TABLET ORAL EVERY 6 HOURS PRN
Status: DISCONTINUED | OUTPATIENT
Start: 2022-12-08 | End: 2022-12-10 | Stop reason: HOSPADM

## 2022-12-08 RX ORDER — PANTOPRAZOLE SODIUM 40 MG/1
40 TABLET, DELAYED RELEASE ORAL
Status: DISCONTINUED | OUTPATIENT
Start: 2022-12-08 | End: 2022-12-10 | Stop reason: HOSPADM

## 2022-12-08 RX ORDER — PANTOPRAZOLE SODIUM 40 MG/1
40 TABLET, DELAYED RELEASE ORAL
Status: CANCELLED | OUTPATIENT
Start: 2022-12-09

## 2022-12-08 RX ORDER — PANTOPRAZOLE SODIUM 40 MG/1
40 TABLET, DELAYED RELEASE ORAL
Status: DISCONTINUED | OUTPATIENT
Start: 2022-12-09 | End: 2022-12-08

## 2022-12-08 RX ADMIN — PANTOPRAZOLE SODIUM 40 MG: 40 TABLET, DELAYED RELEASE ORAL at 15:40

## 2022-12-08 RX ADMIN — NALOXEGOL OXALATE 12.5 MG: 12.5 TABLET, FILM COATED ORAL at 06:15

## 2022-12-08 RX ADMIN — OXYCODONE AND ACETAMINOPHEN 1 TABLET: 5; 325 TABLET ORAL at 17:58

## 2022-12-08 RX ADMIN — ENOXAPARIN SODIUM 40 MG: 100 INJECTION SUBCUTANEOUS at 09:24

## 2022-12-08 RX ADMIN — DIPHENHYDRAMINE HYDROCHLORIDE 25 MG: 25 TABLET ORAL at 17:58

## 2022-12-08 RX ADMIN — MORPHINE SULFATE 4 MG: 4 INJECTION, SOLUTION INTRAMUSCULAR; INTRAVENOUS at 06:20

## 2022-12-08 ASSESSMENT — PAIN SCALES - GENERAL
PAINLEVEL_OUTOF10: 6
PAINLEVEL_OUTOF10: 0

## 2022-12-08 ASSESSMENT — PAIN DESCRIPTION - LOCATION
LOCATION: ABDOMEN
LOCATION: ABDOMEN

## 2022-12-08 ASSESSMENT — PAIN DESCRIPTION - DESCRIPTORS
DESCRIPTORS: ACHING
DESCRIPTORS: SHARP;STABBING

## 2022-12-08 ASSESSMENT — PAIN DESCRIPTION - ORIENTATION: ORIENTATION: MID

## 2022-12-08 ASSESSMENT — PAIN - FUNCTIONAL ASSESSMENT: PAIN_FUNCTIONAL_ASSESSMENT: ACTIVITIES ARE NOT PREVENTED

## 2022-12-08 NOTE — PROGRESS NOTES
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 Blood sugars well controlled  Pathology noted, benign  PLAN   Analgesics and antiemetics as needed -- toradol 15 mg IV q6h prn pain not well controlled. Change Dilaudid to morphine as well. Denies muscle spasms. Pain much better controlled today  IV hydration-discontinue IV fluids  Passing flatus advance to full liquid diet. Oral pain medications ordered. Continue Movantik  Encourage incentive spirometry  Hemoglobin stable resume Lovenox tolerated removal of NG tube. Increase activity  SUBJECTIVE   Josie Fajardo is doing well. She denies any nausea or vomiting, has passed flatus and has not had a bowel movement. Pain much better controlled today.     CURRENT MEDICATIONS   Scheduled Meds:   enoxaparin  40 mg SubCUTAneous Daily    insulin lispro  0-4 Units SubCUTAneous TID WC    insulin lispro  0-4 Units SubCUTAneous Nightly    naloxegol  12.5 mg Oral QAM AC    sodium chloride flush  5-40 mL IntraVENous 2 times per day    pantoprazole (PROTONIX) 40 mg injection  40 mg IntraVENous Daily     Continuous Infusions:   sodium chloride      dextrose       PRN Meds:.ketorolac, phenol, morphine **OR** morphine, sodium chloride flush, sodium chloride, ondansetron **OR** ondansetron, acetaminophen, glucose, dextrose bolus **OR** dextrose bolus, glucagon (rDNA), dextrose, magnesium sulfate, potassium chloride **OR** potassium alternative oral replacement **OR** potassium chloride  OBJECTIVE   CURRENT VITALS: height is 4' 11\" (1.499 m) and weight is 125 lb 7.1 oz (56.9 kg). Her oral temperature is 98.1 °F (36.7 °C). Her blood pressure is 119/59 (abnormal) and her pulse is 78. Her respiration is 16 and oxygen saturation is 99%. Temperature Range (24h):Temp: 98.1 °F (36.7 °C) Temp  Av.3 °F (36.8 °C)  Min: 97.9 °F (36.6 °C)  Max: 99.1 °F (37.3 °C)  BP Range (87Y): Systolic (14AST), TGQ:381 , Min:99 , EFY:189     Diastolic (34CED), XWX:63, Min:53, Max:66    Pulse Range (24h): Pulse  Av.5  Min: 77  Max: 98  Respiration Range (24h): Resp  Av.6  Min: 16  Max: 18  Current Pulse Ox (24h):  SpO2: 99 %  Pulse Ox Range (24h):  SpO2  Av.2 %  Min: 96 %  Max: 99 %  Oxygen Amount and Delivery: O2 Flow Rate (L/min): 2 L/min  Incentive Spirometry Tx:            GENERAL: alert, no distress  LUNGS: clear to ausculation, without wheezes, rales or rhonci  HEART: normal rate and regular rhythm  ABDOMEN: non-distended, bowel sounds hypoactive abdominal tenderness upon palpation, and no guarding or peritoneal signs. Bandage dressing on left abdomen. INCISION: healing well, no significant drainage, no significant erythema  EXTREMITY: no cyanosis, clubbing or edema  In: 200 [P.O.:200]  Out: 700 [Urine:700]       LABS     Recent Labs     22  0544 22  0552 22  1626   WBC 15.2* 9.7  --    HGB 11.3* 9.3* 9.3*   HCT 34.6* 28.2* 28.2*    197  --     141  --    K 4.7 4.1  --     107  --    CO2 24 22*  --    BUN 18 23*  --    CREATININE 0.8 0.9  --    CALCIUM 8.5 8.0*  --         No results for input(s): PTT, INR in the last 72 hours. Invalid input(s): PT  Recent Labs     22  1136   LACTA 1.1       No results for input(s): TROPONINT in the last 72 hours.   RADIOLOGY     CT ABDOMEN PELVIS W IV CONTRAST Additional Contrast? Radiologist Recommendation (iv only)   Final Result   Impression:      Moderate to severe dilation cecum with narrowing proximally and distally      Findings concerning for developing closed-loop obstruction in the right    colon      This document has been electronically signed by: Yogesh Gross MD on    12/04/2022 11:29 PM      All CTs at this facility use dose modulation techniques and iterative    reconstructions, and/or weight-based dosing   when appropriate to reduce radiation to a low as reasonably achievable.         Electronically signed by Meryle Blade, MD on 12/8/2022 at 8:55 AM

## 2022-12-08 NOTE — PLAN OF CARE
Problem: Discharge Planning  Goal: Discharge to home or other facility with appropriate resources  12/7/2022 2319 by Stewart Oscar RN  Outcome: Progressing  12/7/2022 1053 by Francisco Corona RN  Outcome: Progressing  Flowsheets (Taken 12/5/2022 0218 by Blaze Rangel, RN)  Discharge to home or other facility with appropriate resources:   Identify barriers to discharge with patient and caregiver   Arrange for needed discharge resources and transportation as appropriate   Identify discharge learning needs (meds, wound care, etc)   Refer to discharge planning if patient needs post-hospital services based on physician order or complex needs related to functional status, cognitive ability or social support system     Problem: Pain  Goal: Verbalizes/displays adequate comfort level or baseline comfort level  12/7/2022 2319 by Stewart Oscar RN  Outcome: Progressing  12/7/2022 1053 by Francisco Corona RN  Outcome: Progressing  Flowsheets (Taken 12/5/2022 0400 by Blaze Rangel, RN)  Verbalizes/displays adequate comfort level or baseline comfort level:   Encourage patient to monitor pain and request assistance   Assess pain using appropriate pain scale   Administer analgesics based on type and severity of pain and evaluate response     Problem: Chronic Conditions and Co-morbidities  Goal: Patient's chronic conditions and co-morbidity symptoms are monitored and maintained or improved  12/7/2022 2319 by Stewart Oscar RN  Outcome: Progressing  Flowsheets (Taken 12/7/2022 1053 by Francisco Corona RN)  Care Plan - Patient's Chronic Conditions and Co-Morbidity Symptoms are Monitored and Maintained or Improved: Monitor and assess patient's chronic conditions and comorbid symptoms for stability, deterioration, or improvement  12/7/2022 1053 by Francisco Corona RN  Outcome: Progressing  Flowsheets (Taken 12/7/2022 1053)  Care Plan - Patient's Chronic Conditions and Co-Morbidity Symptoms are Monitored and Maintained or Improved: Monitor and assess patient's chronic conditions and comorbid symptoms for stability, deterioration, or improvement     Problem: ABCDS Injury Assessment  Goal: Absence of physical injury  12/7/2022 2319 by Joleen Wadsworth RN  Outcome: Progressing  Flowsheets (Taken 12/7/2022 1053 by Edwina Cuevas RN)  Absence of Physical Injury: Implement safety measures based on patient assessment  12/7/2022 1053 by Edwina Cuevas RN  Outcome: Progressing  Flowsheets (Taken 12/7/2022 1053)  Absence of Physical Injury: Implement safety measures based on patient assessment   Care plan reviewed with patient,  Patient verbalized understanding of the plan of care and contribute to goal setting.

## 2022-12-08 NOTE — PLAN OF CARE
Problem: Discharge Planning  Goal: Discharge to home or other facility with appropriate resources  Outcome: Progressing  Flowsheets (Taken 12/8/2022 0818)  Discharge to home or other facility with appropriate resources: Identify barriers to discharge with patient and caregiver     Problem: Pain  Goal: Verbalizes/displays adequate comfort level or baseline comfort level  Outcome: Progressing  Flowsheets (Taken 12/8/2022 0908)  Verbalizes/displays adequate comfort level or baseline comfort level:   Encourage patient to monitor pain and request assistance   Assess pain using appropriate pain scale   Administer analgesics based on type and severity of pain and evaluate response     Problem: Chronic Conditions and Co-morbidities  Goal: Patient's chronic conditions and co-morbidity symptoms are monitored and maintained or improved  Outcome: Progressing  Flowsheets (Taken 12/8/2022 0818)  Care Plan - Patient's Chronic Conditions and Co-Morbidity Symptoms are Monitored and Maintained or Improved:   Monitor and assess patient's chronic conditions and comorbid symptoms for stability, deterioration, or improvement   Collaborate with multidisciplinary team to address chronic and comorbid conditions and prevent exacerbation or deterioration   Update acute care plan with appropriate goals if chronic or comorbid symptoms are exacerbated and prevent overall improvement and discharge     Problem: ABCDS Injury Assessment  Goal: Absence of physical injury  Outcome: Progressing

## 2022-12-09 PROBLEM — K56.609 OBSTRUCTION OF ASCENDING COLON (HCC): Status: ACTIVE | Noted: 2022-12-09

## 2022-12-09 LAB
GLUCOSE BLD-MCNC: 104 MG/DL (ref 70–108)
GLUCOSE BLD-MCNC: 135 MG/DL (ref 70–108)
GLUCOSE BLD-MCNC: 136 MG/DL (ref 70–108)
GLUCOSE BLD-MCNC: 157 MG/DL (ref 70–108)
HCT VFR BLD CALC: 28 % (ref 37–47)
HEMOGLOBIN: 9.1 GM/DL (ref 12–16)

## 2022-12-09 PROCEDURE — 36415 COLL VENOUS BLD VENIPUNCTURE: CPT

## 2022-12-09 PROCEDURE — 6370000000 HC RX 637 (ALT 250 FOR IP)

## 2022-12-09 PROCEDURE — 85018 HEMOGLOBIN: CPT

## 2022-12-09 PROCEDURE — 6370000000 HC RX 637 (ALT 250 FOR IP): Performed by: SURGERY

## 2022-12-09 PROCEDURE — 99024 POSTOP FOLLOW-UP VISIT: CPT | Performed by: SURGERY

## 2022-12-09 PROCEDURE — 85014 HEMATOCRIT: CPT

## 2022-12-09 PROCEDURE — 82948 REAGENT STRIP/BLOOD GLUCOSE: CPT

## 2022-12-09 PROCEDURE — 1200000000 HC SEMI PRIVATE

## 2022-12-09 PROCEDURE — 6360000002 HC RX W HCPCS: Performed by: SURGERY

## 2022-12-09 RX ORDER — LEVOTHYROXINE SODIUM 0.03 MG/1
25 TABLET ORAL DAILY
Status: DISCONTINUED | OUTPATIENT
Start: 2022-12-09 | End: 2022-12-10 | Stop reason: HOSPADM

## 2022-12-09 RX ORDER — OXYCODONE HYDROCHLORIDE AND ACETAMINOPHEN 5; 325 MG/1; MG/1
1 TABLET ORAL EVERY 4 HOURS PRN
Qty: 20 TABLET | Refills: 0 | Status: SHIPPED | OUTPATIENT
Start: 2022-12-09 | End: 2022-12-10 | Stop reason: SDUPTHER

## 2022-12-09 RX ORDER — CITALOPRAM 20 MG/1
20 TABLET ORAL DAILY
Status: DISCONTINUED | OUTPATIENT
Start: 2022-12-09 | End: 2022-12-10 | Stop reason: HOSPADM

## 2022-12-09 RX ORDER — DIPHENHYDRAMINE HCL 25 MG
25 TABLET ORAL EVERY 6 HOURS PRN
COMMUNITY
Start: 2022-12-09 | End: 2023-01-08

## 2022-12-09 RX ORDER — LISINOPRIL 2.5 MG/1
2.5 TABLET ORAL DAILY
Status: DISCONTINUED | OUTPATIENT
Start: 2022-12-09 | End: 2022-12-10 | Stop reason: HOSPADM

## 2022-12-09 RX ORDER — TROSPIUM CHLORIDE 20 MG/1
20 TABLET, FILM COATED ORAL
Status: DISCONTINUED | OUTPATIENT
Start: 2022-12-09 | End: 2022-12-10 | Stop reason: HOSPADM

## 2022-12-09 RX ORDER — DOCUSATE SODIUM 100 MG/1
100 CAPSULE, LIQUID FILLED ORAL DAILY
Status: DISCONTINUED | OUTPATIENT
Start: 2022-12-09 | End: 2022-12-10 | Stop reason: HOSPADM

## 2022-12-09 RX ORDER — HYDROXYCHLOROQUINE SULFATE 200 MG/1
300 TABLET, FILM COATED ORAL DAILY
Status: DISCONTINUED | OUTPATIENT
Start: 2022-12-09 | End: 2022-12-10 | Stop reason: HOSPADM

## 2022-12-09 RX ORDER — CYCLOBENZAPRINE HCL 10 MG
10 TABLET ORAL 3 TIMES DAILY PRN
Status: DISCONTINUED | OUTPATIENT
Start: 2022-12-09 | End: 2022-12-10 | Stop reason: HOSPADM

## 2022-12-09 RX ORDER — TOLTERODINE TARTRATE 1 MG/1
1 TABLET, EXTENDED RELEASE ORAL 2 TIMES DAILY
Status: DISCONTINUED | OUTPATIENT
Start: 2022-12-09 | End: 2022-12-09 | Stop reason: CLARIF

## 2022-12-09 RX ADMIN — LEVOTHYROXINE SODIUM 25 MCG: 0.03 TABLET ORAL at 09:47

## 2022-12-09 RX ADMIN — OXYCODONE AND ACETAMINOPHEN 1 TABLET: 5; 325 TABLET ORAL at 06:51

## 2022-12-09 RX ADMIN — DIPHENHYDRAMINE HYDROCHLORIDE 25 MG: 25 TABLET ORAL at 06:51

## 2022-12-09 RX ADMIN — DIPHENHYDRAMINE HYDROCHLORIDE 25 MG: 25 TABLET ORAL at 22:16

## 2022-12-09 RX ADMIN — DOCUSATE SODIUM 100 MG: 100 CAPSULE, LIQUID FILLED ORAL at 16:31

## 2022-12-09 RX ADMIN — OXYCODONE AND ACETAMINOPHEN 1 TABLET: 5; 325 TABLET ORAL at 22:16

## 2022-12-09 RX ADMIN — TROSPIUM CHLORIDE 20 MG: 20 TABLET, FILM COATED ORAL at 16:31

## 2022-12-09 RX ADMIN — TROSPIUM CHLORIDE 20 MG: 20 TABLET, FILM COATED ORAL at 09:47

## 2022-12-09 RX ADMIN — ENOXAPARIN SODIUM 40 MG: 100 INJECTION SUBCUTANEOUS at 09:47

## 2022-12-09 RX ADMIN — LISINOPRIL 2.5 MG: 2.5 TABLET ORAL at 09:47

## 2022-12-09 RX ADMIN — PANTOPRAZOLE SODIUM 40 MG: 40 TABLET, DELAYED RELEASE ORAL at 05:47

## 2022-12-09 RX ADMIN — HYDROXYCHLOROQUINE SULFATE 300 MG: 200 TABLET, FILM COATED ORAL at 09:46

## 2022-12-09 RX ADMIN — NALOXEGOL OXALATE 12.5 MG: 12.5 TABLET, FILM COATED ORAL at 05:47

## 2022-12-09 RX ADMIN — CITALOPRAM HYDROBROMIDE 20 MG: 20 TABLET ORAL at 09:47

## 2022-12-09 ASSESSMENT — PAIN DESCRIPTION - LOCATION
LOCATION: ABDOMEN
LOCATION: ABDOMEN;HEAD

## 2022-12-09 ASSESSMENT — PAIN SCALES - GENERAL
PAINLEVEL_OUTOF10: 0
PAINLEVEL_OUTOF10: 5
PAINLEVEL_OUTOF10: 6
PAINLEVEL_OUTOF10: 3

## 2022-12-09 ASSESSMENT — PAIN DESCRIPTION - DESCRIPTORS
DESCRIPTORS: SORE
DESCRIPTORS: ACHING

## 2022-12-09 NOTE — CARE COORDINATION
12/9/22, 12:12 PM EST    DISCHARGE ON GOING EVALUATION    1999 [facility] day: 4  Location: 8A-14/014-A Reason for admit: Partial small bowel obstruction ([facility] [address] [K56.600]  Complete intestinal obstruction, unspecified cause ([address] [K56.601]  Complete small bowel obstruction ([address] [K56.601]   Procedure: 12/4 CT Abdomen Pelvis W IV Contrast Moderate to severe dilation cecum with narrowing proximally and distally   Findings concerning for developing closed-loop obstruction in the right   Colon  12-5-22 12-5-22  Right colon resection with removal of terminal ileum  Barriers to Discharge: POD#4. + Flatus. No BM yet. Tolerating diet. Advance to regular diet. Pt up and moving. Anticipate discharge possibly over weekend. PCP: KARLA Nixon CNP  Readmission Risk Score: 10.9%  Patient Goals/Plan/Treatment Preferences: Plans home with spouse. No services requested. 12/9/22, 12:15 PM EST    Patient goals/plan/ treatment preferences discussed by  and . Patient goals/plan/ treatment preferences reviewed with patient/ family. Patient/ family verbalize understanding of discharge plan and are in agreement with goal/plan/treatment preferences. Understanding was demonstrated using the teach back method. AVS provided by RN at time of discharge, which includes all necessary medical information pertaining to the patients current course of illness, treatment, post-discharge goals of care, and treatment preferences. Services At/After Discharge: None       IMM Letter  IMM Letter given to Patient/Family/Significant other/Guardian/POA/by[de-identified] Vidhya CUELLAR Case Manager. IMM Letter date given[de-identified] 12/09/22  IMM Letter time given[de-identified] 0100     Anticipate discharge in next 24-48 hours. Plans return home with spouse.

## 2022-12-09 NOTE — PROGRESS NOTES
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 Blood sugars well controlled  Pathology noted, benign  Hg stable  PLAN   Analgesics and antiemetics as needed - pain controlledon oral medications  IV hydration-discontinue IV fluids  Passing flatus advance to regular diet. Continue Movantik  Encourage incentive spirometry  Hemoglobin stable . Ambulating in halls  [address] is doing well. She denies any nausea or vomiting, has passed flatus and has not had a bowel movement. Has ambulated . Tolerating full liquid diet. Scheduled Meds:   pantoprazole  40 mg Oral QAM AC    enoxaparin  40 mg SubCUTAneous Daily    insulin lispro  0-4 Units SubCUTAneous TID WC    insulin lispro  0-4 Units SubCUTAneous Nightly    naloxegol  12.5 mg Oral QAM AC    sodium chloride flush  5-40 mL IntraVENous 2 times per day     Continuous Infusions:   sodium chloride      dextrose       PRN Meds:.oxyCODONE-acetaminophen, diphenhydrAMINE, ketorolac, phenol, morphine **OR** morphine, sodium chloride flush, sodium chloride, ondansetron **OR** ondansetron, acetaminophen, glucose, dextrose bolus **OR** dextrose bolus, glucagon (rDNA), dextrose, magnesium sulfate, potassium chloride **OR** potassium alternative oral replacement **OR** potassium chloride  OBJECTIVE   CURRENT VITALS:  height is 4' 11\" (1.499 m) and weight is 125 lb 7.1 oz (56.9 kg). Her oral temperature is 98.8 °F (37.1 °C). Her blood pressure is 128/59 (abnormal) and her pulse is 72.  Her respiration is 16 and oxygen saturation is 100%. Temperature Range (24h):Temp: 98.8 °F (37.1 °C) Temp  Av.2 °F (36.8 °C)  Min: 98 °F (36.7 °C)  Max: 98.8 °F (37.1 °C)  BP Range (57V): Systolic (20UFJ), LYD:577 , Min:120 , JUF:375     Diastolic (26SVP), UWV:05, Min:59, Max:78    Pulse Range (24h): Pulse  Av  Min: 72  Max: 85  Respiration Range (24h): Resp  Av.6  Min: 16  Max: 18  Current Pulse Ox (24h):  SpO2: 100 %  Pulse Ox Range (24h):  SpO2  Av.6 %  Min: 99 %  Max: 100 %  Oxygen Amount and Delivery: O2 Flow Rate (L/min): 2 L/min  Incentive Spirometry Tx:            GENERAL: alert, no distress  LUNGS: clear to ausculation, without wheezes, rales or rhonci  HEART: normal rate and regular rhythm  ABDOMEN: non-distended, bowel sounds hypoactive abdominal tenderness upon palpation, and no guarding or peritoneal signs. Incision well approximated. No drainage. INCISION: healing well, no significant drainage, no significant erythema  EXTREMITY: no cyanosis, clubbing or edema  In: -   Out: 1000 [Urine:1000]       LABS     Recent Labs     22  0552 22  1626 22  0629   WBC 9.7  --   --    HGB 9.3* 9.3* 9.1*   HCT 28.2* 28.2* 28.0*     --   --      --   --    K 4.1  --   --      --   --    CO2 22*  --   --    BUN 23*  --   --    CREATININE 0.9  --   --    CALCIUM 8.0*  --   --         No results for input(s): PTT, INR in the last 72 hours. Invalid input(s): PT  No results for input(s): AST, ALT, BILITOT, BILIDIR, AMYLASE, LIPASE, LDH, LACTA in the last 72 hours. No results for input(s): TROPONINT in the last 72 hours.   RADIOLOGY     CT ABDOMEN PELVIS W IV CONTRAST Additional Contrast? Radiologist Recommendation (iv only)   Final Result   Impression:      Moderate to severe dilation cecum with narrowing proximally and distally      Findings concerning for developing closed-loop obstruction in the right    colon      This document has been electronically signed by: Bianca Best MD on    12/04/2022 11:29 PM      All CTs at this facility use dose modulation techniques and iterative    reconstructions, and/or weight-based dosing   when appropriate to reduce radiation to a low as reasonably achievable.         Electronically signed by Felix Max MD on 12/9/2022 at 8:14 AM

## 2022-12-09 NOTE — DISCHARGE INSTRUCTIONS
DR TRAYLOR'S DISCHARGE INSTRUCTIONS    Pt Name: Pankaj Cruz  Medical Record Number: [id number]  Today's Date: 12/9/2022    ACTIVITY INSTRUCTIONS:  [] Rest today. Resume light to normal activity tomorrow.   [] You may resume normal activity tomorrow. Do not engage in strenuous activity that may place stress on your incision. [x] Do not drive for 3-5 days and avoid heavy lifting, tugging, pullings greater than 10-20 lbs until seen in the office. DIET INSTRUCTIONS:  []Begin with clear liquids. If not nauseated, may increase to a low-fat diet when you desire. Greasy and spicy foods are not advised. [x]Regular diet as tolerated. []Other:     MEDICATIONS  [x]Prescription sent with you to be used as directed. []Lortab   []Norco   [x]Percocet   []Tylenol #3   []Oxycontin   Do not drink alcohol or drive while taking these medications. You may experience dizziness or drowsiness with these medications. You may also experience constipation which can be relieved with stool softners or laxatives. [x]You may resume your daily prescription medication schedule unless otherwise specified. []Do not take 325mg Aspirin or other blood thinners such as Coumadin or Plavix for 5 days. WOUND/DRESSING INSTRUCTIONS:  Always ensure you and your care giver clean hands before and after caring for the wound. [] Keep dressing clean and dry for 48 hours. Change when soiled or wet. [] Allow steri-strips to fall off on their own.   [] Ice operative site for 20 minutes 4 times a day. [x] May wash over incision in shower , but do not soak in a bath.  [] Take sitz bath for 20 minutes twice daily and after bowel movements. [] Keep the abdominal binder in place during the day. May remove to shower and at night.  [] Remove packing from wound in 24 hours and replace with AMD dressings daily. [] Empty HANY drain daily and record the amounts.     BREAST PROCEDURES  []Following a breast procedure, it is important to continue to where supportive garments. []Following a sentinal lymph node biopsy, you should not be alarmed if your urine has a blue color to it. This is your body eliminating the dye used for the procedure. ABDOMINAL/LAPAROSCOPIC SURGERY  [x]You are encouraged to get up and move around as this helps with the circulation and speeds up the healing process. [x]Breath deeply and cough from time to time. This helps to clear your lungs and helps prevent pneumonia. [x]Supporting your incision with a pillow or your hand helps to minimize discomfort and pain. []Laparoscopic patients may develop shoulder pain in the first 48 hours from the gas used during the procedure. FOLLOW-UP CARE. SPECIFICALLY WATCH FOR:   Fever over 101 degrees by mouth   Increased redness, warmth, hardness at operative site. Blood soaked dressing (small amounts of oozing may be normal.)   Increased or progressive drainage from the surgical area   Inability to urinate or blood in the urine   Pain not relieved by the medications ordered   Persistent nausea and/or vomiting, unable to retain fluids. FOLLOW-UP APPOINTMENT   []1 week   []2 weeks   [x]Other January 5, 2011 a.m. Dr. Travon Baeza. [facility]. 360    Call my office if you have any problem that concerns you [phone]. After hours, you can reach the answering service via the office phone number. IF YOU NEED IMMEDIATE ATTENTION, GO TO THE EMERGENCY ROOM AND YOUR DOCTOR WILL BE CONTACTED. Lucina Colbert MD MD  [address] [place] [address]  [facility], [address]  Electronically signed 12/9/2022 at 8:44 AM

## 2022-12-10 VITALS
OXYGEN SATURATION: 100 % | DIASTOLIC BLOOD PRESSURE: 70 MMHG | HEART RATE: 85 BPM | HEIGHT: 59 IN | WEIGHT: 125.44 LBS | SYSTOLIC BLOOD PRESSURE: 139 MMHG | TEMPERATURE: 98.1 F | RESPIRATION RATE: 16 BRPM | BODY MASS INDEX: 25.29 KG/M2

## 2022-12-10 LAB — GLUCOSE BLD-MCNC: 118 MG/DL (ref 70–108)

## 2022-12-10 PROCEDURE — 6370000000 HC RX 637 (ALT 250 FOR IP): Performed by: SURGERY

## 2022-12-10 PROCEDURE — 6370000000 HC RX 637 (ALT 250 FOR IP)

## 2022-12-10 PROCEDURE — 99024 POSTOP FOLLOW-UP VISIT: CPT | Performed by: SURGERY

## 2022-12-10 PROCEDURE — 82948 REAGENT STRIP/BLOOD GLUCOSE: CPT

## 2022-12-10 PROCEDURE — 6360000002 HC RX W HCPCS: Performed by: SURGERY

## 2022-12-10 RX ORDER — OXYCODONE HYDROCHLORIDE AND ACETAMINOPHEN 5; 325 MG/1; MG/1
1 TABLET ORAL EVERY 6 HOURS PRN
Qty: 20 TABLET | Refills: 0 | Status: SHIPPED | OUTPATIENT
Start: 2022-12-10 | End: 2022-12-15

## 2022-12-10 RX ADMIN — CITALOPRAM HYDROBROMIDE 20 MG: 20 TABLET ORAL at 09:03

## 2022-12-10 RX ADMIN — OXYCODONE AND ACETAMINOPHEN 1 TABLET: 5; 325 TABLET ORAL at 09:03

## 2022-12-10 RX ADMIN — LEVOTHYROXINE SODIUM 25 MCG: 0.03 TABLET ORAL at 06:22

## 2022-12-10 RX ADMIN — PANTOPRAZOLE SODIUM 40 MG: 40 TABLET, DELAYED RELEASE ORAL at 06:22

## 2022-12-10 RX ADMIN — DIPHENHYDRAMINE HYDROCHLORIDE 25 MG: 25 TABLET ORAL at 09:04

## 2022-12-10 RX ADMIN — TROSPIUM CHLORIDE 20 MG: 20 TABLET, FILM COATED ORAL at 06:22

## 2022-12-10 RX ADMIN — NALOXEGOL OXALATE 12.5 MG: 12.5 TABLET, FILM COATED ORAL at 06:22

## 2022-12-10 RX ADMIN — DOCUSATE SODIUM 100 MG: 100 CAPSULE, LIQUID FILLED ORAL at 09:03

## 2022-12-10 RX ADMIN — LISINOPRIL 2.5 MG: 2.5 TABLET ORAL at 09:03

## 2022-12-10 RX ADMIN — ENOXAPARIN SODIUM 40 MG: 100 INJECTION SUBCUTANEOUS at 09:04

## 2022-12-10 RX ADMIN — HYDROXYCHLOROQUINE SULFATE 300 MG: 200 TABLET, FILM COATED ORAL at 09:03

## 2022-12-10 ASSESSMENT — PAIN SCALES - GENERAL
PAINLEVEL_OUTOF10: 7
PAINLEVEL_OUTOF10: 7

## 2022-12-10 ASSESSMENT — PAIN DESCRIPTION - ORIENTATION: ORIENTATION: MID

## 2022-12-10 ASSESSMENT — PAIN DESCRIPTION - LOCATION: LOCATION: ABDOMEN

## 2022-12-10 ASSESSMENT — PAIN DESCRIPTION - ONSET: ONSET: GRADUAL

## 2022-12-10 ASSESSMENT — PAIN DESCRIPTION - DESCRIPTORS: DESCRIPTORS: SHARP

## 2022-12-10 ASSESSMENT — PAIN DESCRIPTION - FREQUENCY: FREQUENCY: INTERMITTENT

## 2022-12-10 ASSESSMENT — PAIN DESCRIPTION - PAIN TYPE: TYPE: SURGICAL PAIN

## 2022-12-10 NOTE — PROGRESS NOTES
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 Blood sugars well controlled  Pathology noted, benign  Hg stable  PLAN   Discharge today  Jessica Hernandez is doing well. She denies any nausea or vomiting, has passed flatus and has not had a bowel movement. Has ambulated . Tolerating full liquid diet. Scheduled Meds:   citalopram  20 mg Oral Daily    hydroxychloroquine  300 mg Oral Daily    levothyroxine  25 mcg Oral Daily    lisinopril  2.5 mg Oral Daily    trospium  20 mg Oral BID AC    docusate sodium  100 mg Oral Daily    pantoprazole  40 mg Oral QAM AC    enoxaparin  40 mg SubCUTAneous Daily    insulin lispro  0-4 Units SubCUTAneous TID WC    insulin lispro  0-4 Units SubCUTAneous Nightly    naloxegol  12.5 mg Oral QAM AC     Continuous Infusions:   dextrose       PRN Meds:.cyclobenzaprine, oxyCODONE-acetaminophen, diphenhydrAMINE, ketorolac, phenol, morphine **OR** morphine, ondansetron **OR** ondansetron, acetaminophen, glucose, dextrose bolus **OR** dextrose bolus, glucagon (rDNA), dextrose, magnesium sulfate, potassium chloride **OR** potassium alternative oral replacement **OR** potassium chloride  OBJECTIVE   CURRENT VITALS:  height is 4' 11\" (1.499 m) and weight is 125 lb 7.1 oz (56.9 kg). Her oral temperature is 97.8 °F (36.6 °C). Her blood pressure is 127/64 and her pulse is 75. Her respiration is 16 and oxygen saturation is 100%.    Temperature Range (24h):Temp: 97.8 °F (36.6 °C) Temp  Av.3 °F (36.8 °C)  Min: 97.8 °F (36.6 °C)  Max: 99 °F (37.2 °C)  BP Range (34O): Systolic (57ALO), HJV:804 , Min:110 , JPO:135     Diastolic (58HEQ), KHP:00, Min:51, Max:69    Pulse Range (24h): Pulse  Av.7  Min: 74  Max: 82  Respiration Range (24h): Resp  Av.3  Min: 16  Max: 18  Current Pulse Ox (24h):  SpO2: 100 %  Pulse Ox Range (24h):  SpO2  Av.5 %  Min: 98 %  Max: 100 %  Oxygen Amount and Delivery: O2 Flow Rate (L/min): 2 L/min  Incentive Spirometry Tx:            GENERAL: alert, no distress  LUNGS: clear to ausculation, without wheezes, rales or rhonci  HEART: normal rate and regular rhythm  ABDOMEN: non-distended, bowel sounds hypoactive abdominal tenderness upon palpation, and no guarding or peritoneal signs. Incision well approximated. No drainage. INCISION: healing well, no significant drainage, no significant erythema  EXTREMITY: no cyanosis, clubbing or edema  In: -   Out: 1000 [Urine:1000]       LABS     Recent Labs     22  1626 22  0629   HGB 9.3* 9.1*   HCT 28.2* 28.0*      No results for input(s): PTT, INR in the last 72 hours. Invalid input(s): PT  No results for input(s): AST, ALT, BILITOT, BILIDIR, AMYLASE, LIPASE, LDH, LACTA in the last 72 hours. No results for input(s): TROPONINT in the last 72 hours. RADIOLOGY     CT ABDOMEN PELVIS W IV CONTRAST Additional Contrast? Radiologist Recommendation (iv only)   Final Result   Impression:      Moderate to severe dilation cecum with narrowing proximally and distally      Findings concerning for developing closed-loop obstruction in the right    colon      This document has been electronically signed by: Yogesh Gross MD on    2022 11:29 PM      All CTs at this facility use dose modulation techniques and iterative    reconstructions, and/or weight-based dosing   when appropriate to reduce radiation to a low as reasonably achievable.         Electronically signed by Nimisha Cabrera MD on 12/10/2022 at 6:58 AM

## 2022-12-10 NOTE — PLAN OF CARE
Problem: Discharge Planning  Goal: Discharge to home or other facility with appropriate resources  Outcome: Completed     Problem: Pain  Goal: Verbalizes/displays adequate comfort level or baseline comfort level  Outcome: Completed  Flowsheets (Taken 12/10/2022 0858)  Verbalizes/displays adequate comfort level or baseline comfort level:   Encourage patient to monitor pain and request assistance   Assess pain using appropriate pain scale   Administer analgesics based on type and severity of pain and evaluate response     Problem: Chronic Conditions and Co-morbidities  Goal: Patient's chronic conditions and co-morbidity symptoms are monitored and maintained or improved  Outcome: Completed     Problem: ABCDS Injury Assessment  Goal: Absence of physical injury  Outcome: Completed

## 2022-12-12 ENCOUNTER — CARE COORDINATION (OUTPATIENT)
Dept: CASE MANAGEMENT | Age: 72
End: 2022-12-12

## 2022-12-12 NOTE — CARE COORDINATION
Care Transitions Outreach Attempt    Call within 2 business days of discharge: Yes       1st attempt to reach for Care Transition discharge call unsuccessful. HIPAA compliant message left requesting call back to [phone]      Patient: Osbaldo Braswell Patient : 1950 MRN: <[id number]>    Last Discharge [address]       Date Complaint Diagnosis Description Type Department Provider    22 Abdominal Pain; Emesis; Nausea Complete intestinal obstruction, unspecified cause ([facility] . .. ED to Hosp-Admission (Discharged) (ADMITTED) Lanny Acuña MD; Latonia Vasquez. ..        PMH: HTN, DM2, HLD. Was this an external facility discharge?  No Discharge Facility: [facility] following upcoming appointments from discharge chart review:   [facility] follow up appointment(s):   Future Appointments   Date Time Provider dEwin Fung   2023 11:00 AM Ronen Sow MD [facility] [address]     22---930 am-- PCP Vamsi Castro CNP        [facility] follow up appointment(s): YULIYA Carrera RN [phone]

## 2022-12-13 ENCOUNTER — CARE COORDINATION (OUTPATIENT)
Dept: CASE MANAGEMENT | Age: 72
End: 2022-12-13

## 2022-12-13 NOTE — DISCHARGE SUMMARY
Discharge Summary     Patient Identification:  Cristy Rivera  : 1950  MRN: [id number]   Account: [de-identified]     Admit date: 2022  Discharge date: 12/10/2022  Attending provider: Dr. Bryn Meckel     Primary care provider: KARLA Quintana CNP     Discharge Diagnoses:   Principal Problem:    Obstruction of ascending colon [facility])  Active Problems:    Partial small bowel obstruction (HCC)    Complete small bowel obstruction ([facility]    Hypertension    Diabetes ([facility]  Resolved Problems:    * No resolved hospital problems. *  Cecal volvulus/bascule     Hospital Course:   Cristy Rivera is a 81 y.o. female admitted to [address] [place] [address] on 2022 for evaluation of abdominal pain. She had crampy abdominal pain which was severe enough she presented to [facility] emergency department for evaluation. She had a CT scan of the abdomen pelvis which revealed bowel obstruction involving the ascending colon it appeared to be closed-loop. On review of the films. Is that she had a cecal bascule versus cecal volvulus. She had had a colonoscopy within the last year or 2. She was admitted she was nontoxic pain was improving but because of the findings and risk of recurrence she was recommended operative exploration. She was taken to surgery on  underwent an open right colon resection with primary anastomosis. With evidence of return of bowel function her diet was advanced she was tolerating oral intake and was discharged home on postoperative day #5. Pain well controlled with oral pain medications. Pathology reviewed with patient. Distended thin-walled right colon without ischemia.         Discharge Medications:     Medication List        START taking these medications      diphenhydrAMINE 25 MG tablet  Commonly known as: BENADRYL  Take 1 tablet by mouth every 6 hours as needed for Itching     oxyCODONE-acetaminophen 5-325 MG per tablet  Commonly known as: PERCOCET  Take 1 tablet by mouth every 6 hours as needed for Pain for up to 5 days.             CONTINUE taking these medications      atorvastatin 20 MG tablet  Commonly known as: LIPITOR     Biotin 2500 MCG Caps     busPIRone 10 MG tablet  Commonly known as: BUSPAR     citalopram 20 MG tablet  Commonly known as: CELEXA     cyclobenzaprine 10 MG tablet  Commonly known as: FLEXERIL     hydroxychloroquine 200 MG tablet  Commonly known as: PLAQUENIL     levothyroxine 25 MCG tablet  Commonly known as: SYNTHROID     lisinopril 2.5 MG tablet  Commonly known as: PRINIVIL;ZESTRIL     meloxicam 7.5 MG tablet  Commonly known as: MOBIC     metFORMIN 500 MG extended release tablet  Commonly known as: GLUCOPHAGE-XR     PROBIOTIC DAILY PO     therapeutic multivitamin-minerals tablet     tolterodine 1 MG tablet  Commonly known as: DETROL     Trulicity 1.5 AJ/3.0AH SC injection  Generic drug: dulaglutide            STOP taking these medications      traMADol 50 MG tablet  Commonly known as: ULTRAM     vitamin B-12 1000 MCG tablet  Commonly known as: CYANOCOBALAMIN               Where to Get Your Medications        These medications were sent to Paulie Lundy, [facility] [phone]  [address] [address]      Phone: [phone]   oxyCODONE-acetaminophen 5-325 MG per tablet       You can get these medications from any pharmacy    You don't need a prescription for these medications  diphenhydrAMINE 25 MG tablet         Patient Instructions:    Discharge lab work: none  Activity: No lifting greater than 20 pounds  Diet: Diet as tolerated low-carb  Code Status: Prior    Follow-up visits:   KARLA Walden - CNP  [address] [place] [address]  [phone]    Follow up on 12/13/2022  Follow up appointment December 13, 2022 at 9:30am.     Dr. Blake Coronado in 1 to 2 weeks  Procedures: Open right colon resection with removal of terminal ileum and primary ileocolic anastomosis      Weight: Weight: 125 lb 7.1 oz (56.9 kg)     24 hour intake/output:No intake or output data in the 24 hours ending 12/13/22 1213      Significant Diagnostics:   Radiology: CT ABDOMEN PELVIS W IV CONTRAST Additional Contrast? Radiologist Recommendation (iv only)    Result Date: 12/4/2022  *ADDENDUM #1 This report was discussed with Inocencia Monae on Dec 04, 2022 23:33:00 EST. This document has been electronically signed by: Darvin Thompson on 12/04/2022 11:33 PM * CT abdomen and pelvis with contrast Comparison: 12/6/2021 Findings: Severe distention of the cecum with displacement towards the left. Spiral configuration noted right lower quadrant with colonic narrowing. Irregular narrowing also noted at the ileocecal valve with right upper quadrant. There is no small bowel distention proximal to this. There is no distention of the transverse or descending colon. Findings concerning for developing closed-loop obstruction. Lung bases are clear. No acute bony abnormalities. Liver and spleen are unremarkable. Cholecystectomy. Small right hepatic cyst is unchanged from prior study. Pancreas, adrenal glands and kidneys unremarkable. Nonobstructing right renal stone again noted. . Lobular renal contours noted bilaterally. Question congenital versus renal scarring. No aortic aneurysm or dissection noted. No significant free fluid or adenopathy noted in the pelvis. No evidence for diverticulitis. Appendix unremarkable. The appendix is displaced into the right upper quadrant. This is new since prior study where it can be found in the right pelvis. Hysterectomy. No adnexal abnormality.      Impression: Moderate to severe dilation cecum with narrowing proximally and distally Findings concerning for developing closed-loop obstruction in the right colon This document has been electronically signed by: Aurea Olmstead MD on 12/04/2022 11:29 PM All CTs at this facility use dose modulation techniques and iterative reconstructions, and/or weight-based dosing when appropriate to reduce radiation to a low as reasonably achievable. Labs: No results found for this or any previous visit (from the past 72 hour(s)).     Discharge condition: good  Disposition: Home      Electronically signed by Winter Noguera MD on 12/13/22 at 12:13 PM EST

## 2022-12-13 NOTE — CARE COORDINATION
Care Transitions Outreach Attempt    Call within 2 business days of discharge: Yes     Attempted to reach patient for transitions of care follow up. Unable to reach patient. Second attempt with no call back received, therefore episode resolved. UTR letter sent out via My Chart. Patient: [facility] Patient : 1950 MRN: <[id number]>    Last Discharge [address]       Date Complaint Diagnosis Description Type Department Provider    22 Abdominal Pain; Emesis; Nausea Complete intestinal obstruction, unspecified cause ([facility] [address] [address] . .. ED to Hosp-Admission (Discharged) (ADMITTED) Varghese White MD; Paulette Palomo. ..        PMH: HTN, DM2, HLD. Was this an external facility discharge?  No Discharge Facility: [facility]    Noted following upcoming appointments from discharge chart review:   [facility] follow up appointment(s):   Future Appointments   Date Time Provider Edwin Fung   2023 11:00 AM Namrata Lenz MD [id number] [address] [address] follow up appointment(s): YULIYA Ramos RN [phone]

## 2023-01-05 ENCOUNTER — OFFICE VISIT (OUTPATIENT)
Dept: SURGERY | Age: 73
End: 2023-01-05

## 2023-01-05 VITALS
OXYGEN SATURATION: 98 % | BODY MASS INDEX: 23 KG/M2 | DIASTOLIC BLOOD PRESSURE: 62 MMHG | SYSTOLIC BLOOD PRESSURE: 118 MMHG | TEMPERATURE: 97.2 F | HEIGHT: 59 IN | WEIGHT: 114.1 LBS | HEART RATE: 82 BPM | RESPIRATION RATE: 18 BRPM

## 2023-01-05 DIAGNOSIS — Z09 POSTOP CHECK: ICD-10-CM

## 2023-01-05 DIAGNOSIS — K56.609: Primary | ICD-10-CM

## 2023-01-05 PROCEDURE — 99024 POSTOP FOLLOW-UP VISIT: CPT | Performed by: SURGERY

## 2023-01-05 NOTE — PROGRESS NOTES
Trina Ireland MD   General Surgery  Postprocedure Evaluation in Office  Pt Name: Dimitri Sanchez  Date of Birth 1950   Today's Date: 1/5/2023  Medical Record Number: [id number]  Primary Care Provider: KARLA Owusu CNP  Chief Complaint   Patient presents with    Post-Op Check     s/p Right colon resection with removal of terminal ileum 12/5/22     ASSESSMENT      1. Obstruction of ascending colon ([address]    2. Postop check         PLAN       Patient doing well. Reports no issues. Surgical wound well-healed. Recommend no lifting over 25 pounds for 4 to 6 weeks. Cardiovascular activity as tolerated. Follow-up surgical clinic as needed. Call with any questions or concerns. 4.  Return to work as tolerated. She works only as needed. She is having shoulder surgery next month. Vira Matute is seen today for post-op follow-up. She is 4 week(s) status post right colon resection for cecal bascule/volvulus. She has no real complaints she states she feels better every day. Appetite is improving. No chronic diarrhea. Bowel movements have basically returned to baseline. Denies any fever or chills.   Medications    Current Outpatient Medications:     diphenhydrAMINE (BENADRYL) 25 MG tablet, Take 1 tablet by mouth every 6 hours as needed for Itching, Disp: , Rfl:     busPIRone (BUSPAR) 10 MG tablet, Take 10 mg by mouth daily, Disp: , Rfl:     cyclobenzaprine (FLEXERIL) 10 MG tablet, Take 10 mg by mouth 3 times daily as needed for Muscle spasms, Disp: , Rfl:     Biotin 2500 MCG CAPS, Take by mouth, Disp: , Rfl:     levothyroxine (SYNTHROID) 25 MCG tablet, Take 25 mcg by mouth Daily, Disp: , Rfl:     meloxicam (MOBIC) 7.5 MG tablet, Take 7.5 mg by mouth daily, Disp: , Rfl:     metFORMIN (GLUCOPHAGE-XR) 500 MG extended release tablet, Take 1,000 mg by mouth 2 times daily, Disp: , Rfl:     hydroxychloroquine (PLAQUENIL) 200 MG tablet, Take 300 mg by mouth daily , Disp: , Rfl:     tolterodine (DETROL) 1 MG tablet, Take 1 mg by mouth 2 times daily , Disp: , Rfl:     Probiotic Product (PROBIOTIC DAILY PO), Take by mouth 2 times daily, Disp: , Rfl:     atorvastatin (LIPITOR) 20 MG tablet, Take 20 mg by mouth daily , Disp: , Rfl: 11    TRULICITY 1.5 JQ/9.4LT SOPN, Inject 1.5 mg into the skin once a week , Disp: , Rfl: 6    citalopram (CELEXA) 20 MG tablet, Take 20 mg by mouth daily , Disp: , Rfl:     Multiple Vitamins-Minerals (THERAPEUTIC MULTIVITAMIN-MINERALS) tablet, Take 1 tablet by mouth daily, Disp: , Rfl:     lisinopril (PRINIVIL;ZESTRIL) 2.5 MG tablet, Take 2.5 mg by mouth daily , Disp: , Rfl:     Allergies  No Known Allergies    Review of Systems  History obtained from the patient. Constitutional: Denies any fever, chills, fatigue. Wound: Denies any rash, skin color changes or wound problems. Resp: Denies any cough, shortness of breath. CV: Denies any chest pain, orthopnea or syncope. GI:  Denies any nausea, vomiting, blood in the stool, constipation or diarrhea. : Denies any hematuria, hesitancy or dysuria. OBJECTIVE     VITALS: /62 (Site: Right Upper Arm, Position: Sitting, Cuff Size: Medium Adult)   Pulse 82   Temp 97.2 °F (36.2 °C) (Temporal)   Resp 18   Ht 4' 11\" (1.499 m)   Wt 114 lb 1.6 oz (51.8 kg)   SpO2 98%   BMI 23.05 kg/m²     CONSTITUTIONAL: Alert and oriented times 3, no acute distress and cooperative to examination. SKIN: Skin color, texture, turgor normal. No rashes or lesions. INCISION: wound margins intact and healing well. No signs of infection. No drainage. LUNGS: Lungs Clear  CARDIOVASCULAR: Normal Rate  ABDOMEN: Soft incision healing well no erythema no drainage. No hernia. NEUROLOGIC: No sensory or motor nerve irritation      Performed by: [address] [place] [address]. Pathology      [place], [place]             [phone]   Assoc.                                               Page 1 of 333 [facility]   [place], [address] PROC: 2022   [place]                                    RECV: 2022   [address] [employer]                                    RPTD: 2022   [place] ELIZABETH, [address]                       MRN:  [id number]     LOC: 8A                       ACCT: [de-identified]  SEX: F                       : 1950  AGE: 81 Y                          PATHOLOGY REPORT                       ATTN: [facility] OLT                       Lottie Garsia JUAN       Copies To:   Jorge Calix       Clinical Information: PARTIAL SMALL BOWEL OBSTRUCTION     FINAL DIAGNOSIS:   Terminal ileum, cecum, appendix and proximal right colon, resection:     Large bowel dilation with wall thinning. Specimen:   SMALL BOWEL EXCISION, PORTION OF RIGHT WITH APPENDIX       Gross Examination:   The container is labeled Franc Gao, portion of right bowel   with appendix. Received fresh is a segmental resection of bowel   including cecum, terminal ileum, and proximal right colon. The   specimen measures 11 cm in length including 3 cm of terminal ileum. There is an appendix measuring 6.5 cm in length x 0.5 cm in diameter. The serosal surface is dusky. The specimen is opened longitudinally   revealing a tan, smooth focally erythematous mucosa. There are no   masses or polyps. Sections through the appendix are unremarkable. The   bowel segment is dilated up to about 7 cm. Representative sections are   submitted. Cassette #1 - proximal and distal resection lines; cassette   #2 - appendix; and cassettes #3 and #4 - representative large bowel,   proximal to distal.  ss.  ALP/DKR:[id number]     Microscopic Examination:   Sections of large bowel demonstrate thinning of the lamina propria and   muscular layers. The small bowel and large bowel mucosa shows no   evidence of dysplasia. The appendix is unremarkable. There is no   evidence of malignancy.      [id number] <Sign Out Dr. Stephany BECK D.O., DONG OWENS/ Rosario  Printed on:  12/7/2022   Nancy Kasper [address]   [facility], [facility], [place], operated by [facility]   Original print date: 12/07/2022      Specimen Collected: 12/05/22 14:14 EST Last Resulted: 12/07/22 09:48 EST

## 2023-01-06 PROBLEM — K56.601 COMPLETE SMALL BOWEL OBSTRUCTION (HCC): Status: RESOLVED | Noted: 2022-12-06 | Resolved: 2023-01-06

## 2023-01-06 PROBLEM — K56.609 OBSTRUCTION OF ASCENDING COLON (HCC): Status: RESOLVED | Noted: 2022-12-09 | Resolved: 2023-01-06

## 2023-01-06 PROBLEM — K56.600 PARTIAL SMALL BOWEL OBSTRUCTION (HCC): Status: RESOLVED | Noted: 2022-12-05 | Resolved: 2023-01-06

## 2023-08-09 ENCOUNTER — NURSE ONLY (OUTPATIENT)
Dept: LAB | Age: 73
End: 2023-08-09

## 2023-08-09 LAB
ALT SERPL W/O P-5'-P-CCNC: 15 U/L (ref 11–66)
BASOPHILS ABSOLUTE: 0 THOU/MM3 (ref 0–0.1)
BASOPHILS NFR BLD AUTO: 0.2 %
CREAT SERPL-MCNC: 0.9 MG/DL (ref 0.4–1.2)
DEPRECATED RDW RBC AUTO: 47.8 FL (ref 35–45)
EOSINOPHIL NFR BLD AUTO: 1.6 %
EOSINOPHILS ABSOLUTE: 0.1 THOU/MM3 (ref 0–0.4)
ERYTHROCYTE [DISTWIDTH] IN BLOOD BY AUTOMATED COUNT: 13.3 % (ref 11.5–14.5)
ERYTHROCYTE [SEDIMENTATION RATE] IN BLOOD BY WESTERGREN METHOD: 20 MM/HR (ref 0–20)
GFR SERPL CREATININE-BSD FRML MDRD: > 60 ML/MIN/1.73M2
HCT VFR BLD AUTO: 36.9 % (ref 37–47)
HGB BLD-MCNC: 11.9 GM/DL (ref 12–16)
IMM GRANULOCYTES # BLD AUTO: 0.01 THOU/MM3 (ref 0–0.07)
IMM GRANULOCYTES NFR BLD AUTO: 0.2 %
LYMPHOCYTES ABSOLUTE: 1.5 THOU/MM3 (ref 1–4.8)
LYMPHOCYTES NFR BLD AUTO: 28.6 %
MCH RBC QN AUTO: 31.3 PG (ref 26–33)
MCHC RBC AUTO-ENTMCNC: 32.2 GM/DL (ref 32.2–35.5)
MCV RBC AUTO: 97.1 FL (ref 81–99)
MONOCYTES ABSOLUTE: 0.3 THOU/MM3 (ref 0.4–1.3)
MONOCYTES NFR BLD AUTO: 6.2 %
NEUTROPHILS NFR BLD AUTO: 63.2 %
NRBC BLD AUTO-RTO: 0 /100 WBC
PLATELET # BLD AUTO: 315 THOU/MM3 (ref 130–400)
PMV BLD AUTO: 10.4 FL (ref 9.4–12.4)
RBC # BLD AUTO: 3.8 MILL/MM3 (ref 4.2–5.4)
RHEUMATOID FACT SERPL-ACNC: 11 IU/ML (ref 0–13)
SEGMENTED NEUTROPHILS ABSOLUTE COUNT: 3.2 THOU/MM3 (ref 1.8–7.7)
URATE SERPL-MCNC: 6.8 MG/DL (ref 2.4–5.7)
WBC # BLD AUTO: 5.1 THOU/MM3 (ref 4.8–10.8)

## 2023-08-11 LAB
CYCLIC CITRULLINATED PEPTIDE ANTIBODY IGG: < 0.4 U/ML (ref 0–7)
NUCLEAR IGG SER QL IA: DETECTED

## 2023-08-12 LAB
ANA PAT SER IF-IMP: ABNORMAL
NUCLEAR IGG SER QL IF: DETECTED
NUCLEAR IGG TITR SER IF: ABNORMAL {TITER}

## 2024-01-08 ENCOUNTER — NURSE ONLY (OUTPATIENT)
Dept: LAB | Age: 74
End: 2024-01-08

## 2024-01-08 LAB
BASOPHILS ABSOLUTE: 0 THOU/MM3 (ref 0–0.1)
BASOPHILS NFR BLD AUTO: 0.2 %
DEPRECATED RDW RBC AUTO: 46.3 FL (ref 35–45)
EOSINOPHIL NFR BLD AUTO: 2.8 %
EOSINOPHILS ABSOLUTE: 0.2 THOU/MM3 (ref 0–0.4)
ERYTHROCYTE [DISTWIDTH] IN BLOOD BY AUTOMATED COUNT: 12.9 % (ref 11.5–14.5)
ERYTHROCYTE [SEDIMENTATION RATE] IN BLOOD BY WESTERGREN METHOD: 22 MM/HR (ref 0–20)
HCT VFR BLD AUTO: 39.3 % (ref 37–47)
HGB BLD-MCNC: 12.4 GM/DL (ref 12–16)
IMM GRANULOCYTES # BLD AUTO: 0.02 THOU/MM3 (ref 0–0.07)
IMM GRANULOCYTES NFR BLD AUTO: 0.3 %
LYMPHOCYTES ABSOLUTE: 1.5 THOU/MM3 (ref 1–4.8)
LYMPHOCYTES NFR BLD AUTO: 25.8 %
MCH RBC QN AUTO: 30.9 PG (ref 26–33)
MCHC RBC AUTO-ENTMCNC: 31.6 GM/DL (ref 32.2–35.5)
MCV RBC AUTO: 98 FL (ref 81–99)
MONOCYTES ABSOLUTE: 0.3 THOU/MM3 (ref 0.4–1.3)
MONOCYTES NFR BLD AUTO: 4.9 %
NEUTROPHILS NFR BLD AUTO: 66 %
NRBC BLD AUTO-RTO: 0 /100 WBC
PLATELET # BLD AUTO: 287 THOU/MM3 (ref 130–400)
PMV BLD AUTO: 11.1 FL (ref 9.4–12.4)
RBC # BLD AUTO: 4.01 MILL/MM3 (ref 4.2–5.4)
SEGMENTED NEUTROPHILS ABSOLUTE COUNT: 3.8 THOU/MM3 (ref 1.8–7.7)
WBC # BLD AUTO: 5.8 THOU/MM3 (ref 4.8–10.8)

## 2024-01-09 LAB
ALT SERPL W/O P-5'-P-CCNC: 15 U/L (ref 11–66)
CREAT SERPL-MCNC: 0.7 MG/DL (ref 0.4–1.2)
GFR SERPL CREATININE-BSD FRML MDRD: > 60 ML/MIN/1.73M2
URATE SERPL-MCNC: 4.2 MG/DL (ref 2.4–5.7)

## 2024-01-22 ENCOUNTER — HOSPITAL ENCOUNTER (OUTPATIENT)
Dept: OCCUPATIONAL THERAPY | Age: 74
Setting detail: THERAPIES SERIES
Discharge: HOME | End: 2024-01-22
Payer: MEDICARE

## 2024-01-22 PROCEDURE — 97166 OT EVAL MOD COMPLEX 45 MIN: CPT

## 2024-01-22 NOTE — PROGRESS NOTES
** PLEASE SIGN, DATE AND TIME CERTIFICATION BELOW AND RETURN TO [facility] OUTPATIENT REHABILITATION (FAX #: [phone]).  ATTEST/CO-SIGN IF ACCESSING VIA [employer].  THANK YOU.**    I certify that I have examined the patient below and determined that Physical Medicine and Rehabilitation service is necessary and that I approve the established plan of care for up to 90 days or as specifically noted.  Attestation, signature or co-signature of physician indicates approval of certification requirements.    ________________________ ____________ __________  Physician Signature   Date   Time   [facility]  OCCUPATIONAL THERAPY  [x] EVALUATION  [] DAILY NOTE (LAND) [] DAILY NOTE (AQUATIC ) [] PROGRESS NOTE [] DISCHARGE NOTE    [] OUTPATIENT REHABILITATION [facility]   [] [facility]    [x] [facility]   [] [facility]    Date: 2024  Patient Name:  Rosa Isela Short  : 1950  MRN: [id number]  CSN: [id number]    Referring Practitioner Serjio Ramirez MD    Diagnosis Presence of left artificial shoulder joint    Treatment Diagnosis M25.512  Left Shoulder Pain  M25.612 Stiffness of Left Shoulder   Date of Evaluation 24    Additional Pertinent History Diabetic , 2 previous carpal tunnel surgeries on left hand       Functional Outcome Measure Used UEFI   Functional Outcome Score 35/80 (24)       Insurance: Primary: Payor: BCBS MEDICARE /  /  / ,   Secondary:    Authorization Information: PRE CERTIFICATION REQUIRED: yes after eval through [facility] [phone]  INSURANCE THERAPY BENEFIT:  no visit limit based on Medical Necessity  AQUATIC THERAPY COVERED: yes  MODALITIES COVERED:  yes  TELEHEALTH COVERED: na  REFERENCE NUMBER: [id number]   Approved Procedure Codes: 24045 - Therapeutic Exercise, 74022 - Manual Therapy, 05388 - Therapeutic Activities, 91953 - OT ADL Training, 11111 - Neuromuscular Re-Education, and 61621 - Ultrasound  (Codes

## 2024-01-24 ENCOUNTER — HOSPITAL ENCOUNTER (OUTPATIENT)
Dept: OCCUPATIONAL THERAPY | Age: 74
Setting detail: THERAPIES SERIES
Discharge: HOME | End: 2024-01-24
Payer: MEDICARE

## 2024-01-24 PROCEDURE — 97110 THERAPEUTIC EXERCISES: CPT

## 2024-01-24 PROCEDURE — 97035 APP MDLTY 1+ULTRASOUND EA 15: CPT

## 2024-01-24 PROCEDURE — 97140 MANUAL THERAPY 1/> REGIONS: CPT

## 2024-01-24 NOTE — PROGRESS NOTES
[facility]  OCCUPATIONAL THERAPY  [] EVALUATION  [x] DAILY NOTE (LAND) [] DAILY NOTE (AQUATIC ) [] PROGRESS NOTE [] DISCHARGE NOTE    [] OUTPATIENT REHABILITATION CENTER [facility]   [] [place] [facility]    [x] [facility]   [] [facility]    Date: 2024  Patient Name:  Rosa Isela Short  : 1950  MRN: [id number]  CSN: [id number]    Referring Practitioner Serjio Ramirez MD    Diagnosis Presence of left artificial shoulder joint    Treatment Diagnosis M25.512  Left Shoulder Pain  M25.612 Stiffness of Left Shoulder   Date of Evaluation 24    Additional Pertinent History Diabetic , 2 previous carpal tunnel surgeries on left hand       Functional Outcome Measure Used UEFI   Functional Outcome Score 35/80 (24)       Insurance: Primary: Payor: BCBS MEDICARE /  /  / ,   Secondary:    Authorization Information: Received auth for 7 OT visits for CPT codes 71594, 33290, 11943, 82141, 98960 and 17108 from 24 through 3/22/24.     PRE CERTIFICATION REQUIRED: yes [facility] eval [facility] [phone]  INSURANCE THERAPY BENEFIT:  no visit limit based on Medical Necessity  AQUATIC THERAPY COVERED: yes  MODALITIES COVERED:  yes  TELEHEALTH COVERED: na  REFERENCE NUMBER: [id number]   Approved Procedure Codes: 43012 - Therapeutic Exercise, 76959 - Manual Therapy, 77447 - Therapeutic Activities, 20676 - OT ADL Training, 98435 - Neuromuscular Re-Education, and 18525 - Ultrasound  (Codes requested indicated by red font, codes approved indicated by black font)   Visit # 2, 2/10 for progress note   Visits Allowed: 8 (auth date 3/22/24)   Recertification Date: 4/15/24   Physician Follow-Up: 24   Physician Orders: Eval and treat- ROM, strengthening and modalities, stretching and HEP, possible ultrasound if insurance allows it.   History of Present Illness: Pt had a left total reverse shoulder arthoplasty 23. Pt had no difficulties with shoulder

## 2024-01-29 ENCOUNTER — HOSPITAL ENCOUNTER (OUTPATIENT)
Dept: OCCUPATIONAL THERAPY | Age: 74
Setting detail: THERAPIES SERIES
Discharge: HOME | End: 2024-01-29
Payer: MEDICARE

## 2024-01-29 PROCEDURE — 97035 APP MDLTY 1+ULTRASOUND EA 15: CPT

## 2024-01-29 PROCEDURE — 97110 THERAPEUTIC EXERCISES: CPT

## 2024-01-29 NOTE — PROGRESS NOTES
[facility]  OCCUPATIONAL THERAPY  [] EVALUATION  [x] DAILY NOTE (LAND) [] DAILY NOTE (AQUATIC ) [] PROGRESS NOTE [] DISCHARGE NOTE    [] OUTPATIENT REHABILITATION CENTER [facility]   [] [place] [facility]    [x] [facility]   [] [facility]    Date: 2024  Patient Name:  Rosa Isela Short  : 1950  MRN: [id number]  CSN: [id number]    Referring Practitioner Serjio Ramirez MD    Diagnosis Presence of left artificial shoulder joint    Treatment Diagnosis M25.512  Left Shoulder Pain  M25.612 Stiffness of Left Shoulder   Date of Evaluation 24    Additional Pertinent History Diabetic , 2 previous carpal tunnel surgeries on left hand       Functional Outcome Measure Used UEFI   Functional Outcome Score 35/80 (24)       Insurance: Primary: Payor: BCBS MEDICARE /  /  / ,   Secondary:    Authorization Information: Received auth for 7 OT visits for CPT codes 45031, 51960, 63578, 41380, 82748 and 80024 from 24 through 3/22/24.     PRE CERTIFICATION REQUIRED: yes [facility] eval [facility] [phone]  INSURANCE THERAPY BENEFIT:  no visit limit based on Medical Necessity  AQUATIC THERAPY COVERED: yes  MODALITIES COVERED:  yes  TELEHEALTH COVERED: na  REFERENCE NUMBER: [id number]   Approved Procedure Codes: 65801 - Therapeutic Exercise, 84333 - Manual Therapy, 08868 - Therapeutic Activities, 96413 - OT ADL Training, 75057 - Neuromuscular Re-Education, and 51791 - Ultrasound  (Codes requested indicated by red font, codes approved indicated by black font)   Visit # 3, 3/10 for progress note   Visits Allowed: 8 (auth date 3/22/24)   Recertification Date: 4/15/24   Physician Follow-Up: 24   Physician Orders: Eval and treat- ROM, strengthening and modalities, stretching and HEP, possible ultrasound if insurance allows it.   History of Present Illness: Pt had a left total reverse shoulder arthoplasty 23. Pt had no difficulties with shoulder

## 2024-01-31 ENCOUNTER — HOSPITAL ENCOUNTER (OUTPATIENT)
Dept: OCCUPATIONAL THERAPY | Age: 74
Setting detail: THERAPIES SERIES
Discharge: HOME | End: 2024-01-31
Payer: MEDICARE

## 2024-01-31 PROCEDURE — 97035 APP MDLTY 1+ULTRASOUND EA 15: CPT

## 2024-01-31 PROCEDURE — 97110 THERAPEUTIC EXERCISES: CPT

## 2024-01-31 NOTE — PROGRESS NOTES
[facility]  OCCUPATIONAL THERAPY  [] EVALUATION  [x] DAILY NOTE (LAND) [] DAILY NOTE (AQUATIC ) [] PROGRESS NOTE [] DISCHARGE NOTE    [] OUTPATIENT REHABILITATION CENTER [facility]   [] [place] [facility]    [x] [facility]   [] [facility]    Date: 2024  Patient Name:  Rosa Isela Short  : 1950  MRN: [id number]  CSN: [id number]    Referring Practitioner Serjio Ramirez MD    Diagnosis Presence of left artificial shoulder joint    Treatment Diagnosis M25.512  Left Shoulder Pain  M25.612 Stiffness of Left Shoulder   Date of Evaluation 24    Additional Pertinent History Diabetic , 2 previous carpal tunnel surgeries on left hand       Functional Outcome Measure Used UEFI   Functional Outcome Score 35/80 (24)       Insurance: Primary: Payor: BCBS MEDICARE /  /  / ,   Secondary:    Authorization Information: Received auth for 7 OT visits for CPT codes 82004, 29827, 00148, 80641, 87497 and 33676 from 24 through 3/22/24.     PRE CERTIFICATION REQUIRED: yes [facility] [phone]  INSURANCE THERAPY BENEFIT:  no visit limit based on Medical Necessity  AQUATIC THERAPY COVERED: yes  MODALITIES COVERED:  yes  TELEHEALTH COVERED: na  REFERENCE NUMBER: [id number]   Approved Procedure Codes: 65696 - Therapeutic Exercise, 72511 - Manual Therapy, 14130 - Therapeutic Activities, 46251 - OT ADL Training, 37108 - Neuromuscular Re-Education, and 89712 - Ultrasound  (Codes requested indicated by red font, codes approved indicated by black font)   Visit # 4, 4/10 for progress note   Visits Allowed: 8 (auth date 3/22/24)   Recertification Date: 4/15/24   Physician Follow-Up: 24   Physician Orders: Eval and treat- ROM, strengthening and modalities, stretching and HEP, possible ultrasound if insurance allows it.   History of Present Illness: Pt had a left total reverse shoulder arthoplasty 23. Pt had no difficulties with shoulder

## 2024-02-05 ENCOUNTER — HOSPITAL ENCOUNTER (OUTPATIENT)
Dept: OCCUPATIONAL THERAPY | Age: 74
Setting detail: THERAPIES SERIES
Discharge: HOME | End: 2024-02-05
Payer: MEDICARE

## 2024-02-05 PROCEDURE — 97110 THERAPEUTIC EXERCISES: CPT

## 2024-02-05 NOTE — PROGRESS NOTES
[facility]  OCCUPATIONAL THERAPY  [] EVALUATION  [x] DAILY NOTE (LAND) [] DAILY NOTE (AQUATIC ) [] PROGRESS NOTE [] DISCHARGE NOTE    [] OUTPATIENT REHABILITATION CENTER [facility]   [] [place] [facility]    [x] [facility]   [] [facility]    Date: 2024  Patient Name:  Rosa Isela Short  : 1950  MRN: [id number]  CSN: [id number]    Referring Practitioner Serjio Ramirez MD    Diagnosis Presence of left artificial shoulder joint    Treatment Diagnosis M25.512  Left Shoulder Pain  M25.612 Stiffness of Left Shoulder   Date of Evaluation 24    Additional Pertinent History Diabetic , 2 previous carpal tunnel surgeries on left hand       Functional Outcome Measure Used UEFI   Functional Outcome Score 35/80 (24)       Insurance: Primary: Payor: BCBS MEDICARE /  /  / ,   Secondary:    Authorization Information: Received auth for 7 OT visits for CPT codes 28580, 31855, 76043, 23582, 61079 and 14727 from 24 through 3/22/24.     PRE CERTIFICATION REQUIRED: yes [facility] [phone]  INSURANCE THERAPY BENEFIT:  no visit limit based on Medical Necessity  AQUATIC THERAPY COVERED: yes  MODALITIES COVERED:  yes  TELEHEALTH COVERED: na  REFERENCE NUMBER: [id number]   Approved Procedure Codes: 84201 - Therapeutic Exercise, 76208 - Manual Therapy, 93161 - Therapeutic Activities, 57518 - OT ADL Training, 64255 - Neuromuscular Re-Education, and 76866 - Ultrasound  (Codes requested indicated by red font, codes approved indicated by black font)   Visit # 5, 5/10 for progress note   Visits Allowed: 8 (auth date 3/22/24)   Recertification Date: 4/15/24   Physician Follow-Up: 24   Physician Orders: Eval and treat- ROM, strengthening and modalities, stretching and HEP, possible ultrasound if insurance allows it.   History of Present Illness: Pt had a left total reverse shoulder arthoplasty 23. Pt had no difficulties with shoulder

## 2024-02-09 ENCOUNTER — HOSPITAL ENCOUNTER (OUTPATIENT)
Dept: OCCUPATIONAL THERAPY | Age: 74
Setting detail: THERAPIES SERIES
Discharge: HOME | End: 2024-02-09
Payer: MEDICARE

## 2024-02-09 PROCEDURE — 97035 APP MDLTY 1+ULTRASOUND EA 15: CPT

## 2024-02-09 PROCEDURE — 97110 THERAPEUTIC EXERCISES: CPT

## 2024-02-09 NOTE — PROGRESS NOTES
[facility]  OCCUPATIONAL THERAPY  [] EVALUATION  [x] DAILY NOTE (LAND) [] DAILY NOTE (AQUATIC ) [] PROGRESS NOTE [] DISCHARGE NOTE    [] OUTPATIENT REHABILITATION CENTER [facility]   [] [place] [facility]    [x] [facility]   [] [facility]    Date: 2024  Patient Name:  Rosa Isela Short  : 1950  MRN: [id number]  CSN: [id number]    Referring Practitioner Serjio Ramirez MD    Diagnosis Presence of left artificial shoulder joint    Treatment Diagnosis M25.512  Left Shoulder Pain  M25.612 Stiffness of Left Shoulder   Date of Evaluation 24    Additional Pertinent History Diabetic , 2 previous carpal tunnel surgeries on left hand       Functional Outcome Measure Used UEFI   Functional Outcome Score 35/80 (24)       Insurance: Primary: Payor: BCBS MEDICARE /  /  / ,   Secondary:    Authorization Information: Received auth for 7 OT visits for CPT codes 76735, 72354, 61202, 61458, 84809 and 84049 from 24 through 3/22/24.     PRE CERTIFICATION REQUIRED: yes [facility] [phone]  INSURANCE THERAPY BENEFIT:  no visit limit based on Medical Necessity  AQUATIC THERAPY COVERED: yes  MODALITIES COVERED:  yes  TELEHEALTH COVERED: na  REFERENCE NUMBER: [id number]   Approved Procedure Codes: 64279 - Therapeutic Exercise, 53786 - Manual Therapy, 36990 - Therapeutic Activities, 24528 - OT ADL Training, 73107 - Neuromuscular Re-Education, and 56011 - Ultrasound  (Codes requested indicated by red font, codes approved indicated by black font)   Visit # 6, 610 for progress note   Visits Allowed: 8 (auth date 3/22/24)   Recertification Date: 4/15/24   Physician Follow-Up: 24   Physician Orders: Eval and treat- ROM, strengthening and modalities, stretching and HEP, possible ultrasound if insurance allows it.   History of Present Illness: Pt had a left total reverse shoulder arthoplasty 23. Pt had no difficulties with shoulder

## 2024-02-12 ENCOUNTER — HOSPITAL ENCOUNTER (OUTPATIENT)
Dept: OCCUPATIONAL THERAPY | Age: 74
Setting detail: THERAPIES SERIES
Discharge: HOME | End: 2024-02-12
Payer: MEDICARE

## 2024-02-12 PROCEDURE — 97110 THERAPEUTIC EXERCISES: CPT

## 2024-02-12 PROCEDURE — 97035 APP MDLTY 1+ULTRASOUND EA 15: CPT

## 2024-02-12 NOTE — PROGRESS NOTES
[facility]  OCCUPATIONAL THERAPY  [] EVALUATION  [x] DAILY NOTE (LAND) [] DAILY NOTE (AQUATIC ) [] PROGRESS NOTE [] DISCHARGE NOTE    [] OUTPATIENT REHABILITATION CENTER [facility]   [] [place] [facility]    [x] [facility]   [] [facility]    Date: 2024  Patient Name:  Rosa Isela Short  : 1950  MRN: [id number]  CSN: [id number]    Referring Practitioner Serjio Ramirez MD    Diagnosis Presence of left artificial shoulder joint    Treatment Diagnosis M25.512  Left Shoulder Pain  M25.612 Stiffness of Left Shoulder   Date of Evaluation 24    Additional Pertinent History Diabetic , 2 previous carpal tunnel surgeries on left hand       Functional Outcome Measure Used UEFI   Functional Outcome Score 35/80 (24)       Insurance: Primary: Payor: BCBS MEDICARE /  /  / ,   Secondary:    Authorization Information: Received auth for 7 OT visits for CPT codes 78818, 89092, 66900, 15097, 67380 and 42673 from 24 through 3/22/24.     PRE CERTIFICATION REQUIRED: yes [facility] [phone]  INSURANCE THERAPY BENEFIT:  no visit limit based on Medical Necessity  AQUATIC THERAPY COVERED: yes  MODALITIES COVERED:  yes  TELEHEALTH COVERED: na  REFERENCE NUMBER: [id number]   Approved Procedure Codes: 96705 - Therapeutic Exercise, 81837 - Manual Therapy, 29644 - Therapeutic Activities, 93543 - OT ADL Training, 94453 - Neuromuscular Re-Education, and 69687 - Ultrasound  (Codes requested indicated by red font, codes approved indicated by black font)   Visit # 7, 710 for progress note   Visits Allowed: 8 (auth date 3/22/24)   Recertification Date: 4/15/24   Physician Follow-Up: 24   Physician Orders: Eval and treat- ROM, strengthening and modalities, stretching and HEP, possible ultrasound if insurance allows it.   History of Present Illness: Pt had a left total reverse shoulder arthoplasty 23. Pt had no difficulties with shoulder

## 2024-02-16 ENCOUNTER — HOSPITAL ENCOUNTER (OUTPATIENT)
Dept: OCCUPATIONAL THERAPY | Age: 74
Setting detail: THERAPIES SERIES
Discharge: HOME | End: 2024-02-16
Payer: MEDICARE

## 2024-02-16 PROCEDURE — 97110 THERAPEUTIC EXERCISES: CPT

## 2024-02-16 NOTE — DISCHARGE SUMMARY
shoulder   Biodex at 80 speed x 3 minutes forward and 3 minutes backward       Active motion of left shoulder    Flexion = 115, abduction = 115, ERat 90 = 69, ER at side = 35   Activities Time    Notes/Comments   Rock tape applied to left UE - I strip with 100% tension along bicep, Y strip from proximal forearm to shoulder,   X Applied for pain management; instructed and demonstrated to patient on how to apply at home; gave patient paper-back of tape so she can use it for a pattern   Added piece of rock tape from anterior shoulder and around humeral head    Applied for pain management   Reported increased left hand numbness while supine and head on doubled pillow; placed head on single layer of pillow and numbness went away   Instructed patient to speak with physician regarding her neck due to her numbness changing with her neck position       LEFT UPPER EXTREMITY  RANGE OF MOTION    AROM COMMENTS        Shoulder Flexion 123    Shoulder Extension 50    Shoulder Abduction 114    Shoulder External Rotation 28 at side    Shoulder Internal Rotation Can get left thumb 4\" above waistband behind back          Specific Interventions Next Treatment: Rock tape, ROM, stretching, strengthening, modalities    Activity/Treatment Tolerance:  []  Patient tolerated treatment well  []  Patient limited by fatigue  [x]  Patient limited by pain   []  Patient limited by other medical complications  []  Other:     Assessment:  Patient has made minimal progress toward goals since starting therapy. Patient continues to have a high level of pain in her left shoulder but does relate that the rock tape does assist with the pain in left shoulder. Patient has been instructed on how to apply rock tape and to continue with HEP as she tolerates. Patient has received ultrasound and STM to left shoulder as well as ROM exercises.     GOALS:  Patient Goal: have less pain in left shoulder     Short Term Goals:  Time Frame: 5 weeks   Pt to increase her

## 2024-03-04 ENCOUNTER — NURSE ONLY (OUTPATIENT)
Dept: LAB | Age: 74
End: 2024-03-04

## 2024-03-04 LAB
ALT SERPL W/O P-5'-P-CCNC: 24 U/L (ref 11–66)
BASOPHILS ABSOLUTE: 0 THOU/MM3 (ref 0–0.1)
BASOPHILS NFR BLD AUTO: 0.2 %
CREAT SERPL-MCNC: 0.8 MG/DL (ref 0.4–1.2)
DEPRECATED RDW RBC AUTO: 50.5 FL (ref 35–45)
EOSINOPHIL NFR BLD AUTO: 1.7 %
EOSINOPHILS ABSOLUTE: 0.1 THOU/MM3 (ref 0–0.4)
ERYTHROCYTE [DISTWIDTH] IN BLOOD BY AUTOMATED COUNT: 13.8 % (ref 11.5–14.5)
ERYTHROCYTE [SEDIMENTATION RATE] IN BLOOD BY WESTERGREN METHOD: 18 MM/HR (ref 0–20)
GFR SERPL CREATININE-BSD FRML MDRD: > 60 ML/MIN/1.73M2
HCT VFR BLD AUTO: 37.2 % (ref 37–47)
HGB BLD-MCNC: 11.9 GM/DL (ref 12–16)
IMM GRANULOCYTES # BLD AUTO: 0.01 THOU/MM3 (ref 0–0.07)
IMM GRANULOCYTES NFR BLD AUTO: 0.2 %
LYMPHOCYTES ABSOLUTE: 1.7 THOU/MM3 (ref 1–4.8)
LYMPHOCYTES NFR BLD AUTO: 28.5 %
MCH RBC QN AUTO: 31.9 PG (ref 26–33)
MCHC RBC AUTO-ENTMCNC: 32 GM/DL (ref 32.2–35.5)
MCV RBC AUTO: 99.7 FL (ref 81–99)
MONOCYTES ABSOLUTE: 0.3 THOU/MM3 (ref 0.4–1.3)
MONOCYTES NFR BLD AUTO: 5.8 %
NEUTROPHILS NFR BLD AUTO: 63.6 %
NRBC BLD AUTO-RTO: 0 /100 WBC
PLATELET # BLD AUTO: 259 THOU/MM3 (ref 130–400)
PMV BLD AUTO: 10.9 FL (ref 9.4–12.4)
RBC # BLD AUTO: 3.73 MILL/MM3 (ref 4.2–5.4)
SEGMENTED NEUTROPHILS ABSOLUTE COUNT: 3.7 THOU/MM3 (ref 1.8–7.7)
URATE SERPL-MCNC: 4.3 MG/DL (ref 2.4–5.7)
WBC # BLD AUTO: 5.8 THOU/MM3 (ref 4.8–10.8)

## 2024-03-13 ENCOUNTER — HOSPITAL ENCOUNTER (OUTPATIENT)
Age: 74
Discharge: HOME | End: 2024-03-13
Payer: MEDICARE

## 2024-03-13 LAB
FLUAV AG SPEC QL: NEGATIVE
FLUBV AG SPEC QL: NEGATIVE

## 2024-03-13 PROCEDURE — 87804 INFLUENZA ASSAY W/OPTIC: CPT

## 2024-04-02 ENCOUNTER — NURSE ONLY (OUTPATIENT)
Dept: LAB | Age: 74
End: 2024-04-02

## 2024-04-02 LAB
T4 FREE SERPL-MCNC: 1.2 NG/DL (ref 0.93–1.68)
TSH SERPL DL<=0.005 MIU/L-ACNC: 4.02 UIU/ML (ref 0.4–4.2)

## 2024-04-03 LAB
DEPRECATED MEAN GLUCOSE BLD GHB EST-ACNC: 162 MG/DL (ref 70–126)
HBA1C MFR BLD HPLC: 7.4 % (ref 4.4–6.4)

## 2024-04-04 NOTE — PROGRESS NOTES
Living Expenses: Not hard at all   Food Insecurity: No Food Insecurity (4/8/2024)    Hunger Vital Sign     Worried About Running Out of Food in the Last Year: Never true     Ran Out of Food in the Last Year: Never true   Transportation Needs: Unknown (4/8/2024)    PRAPARE - Transportation     Lack of Transportation (Medical): Not on file     Lack of Transportation (Non-Medical): No   Physical Activity: Unknown (4/8/2024)    Exercise Vital Sign     Days of Exercise per Week: 1 day     Minutes of Exercise per Session: Not on file   Stress: Not on file   Social Connections: Not on file   Intimate Partner Violence: Not on file   Housing Stability: Unknown (4/8/2024)    Housing Stability Vital Sign     Unable to Pay for Housing in the Last Year: Not on file     Number of Places Lived in the Last Year: Not on file     Unstable Housing in the Last Year: No       Allergies:        Patient has no known allergies.    Medications:       Current Outpatient Medications   Medication Sig Dispense Refill    colchicine (COLCRYS) 0.6 MG tablet Take 1 tablet by mouth daily      allopurinol (ZYLOPRIM) 100 MG tablet Take 1 tablet by mouth daily      sulfaSALAzine (AZULFIDINE EN-TABS) 500 MG EC tablet Take 1 tablet by mouth 2 times daily      CALCIUM-VITAMIN D PO Take by mouth      atorvastatin (LIPITOR) 20 MG tablet Take 1 tablet by mouth daily 90 tablet 3    Semaglutide, 1 MG/DOSE, 4 MG/3ML SOPN Inject 1 mg into the skin once a week 3 mL 2    levothyroxine (SYNTHROID) 25 MCG tablet Take 50 mcg on M/W/F and 25 mcg all other days. 135 tablet 0    Biotin 2500 MCG CAPS Take by mouth      metFORMIN (GLUCOPHAGE-XR) 500 MG extended release tablet Take 2 tablets by mouth 2 times daily      tolterodine (DETROL) 1 MG tablet Take 1 tablet by mouth 2 times daily      Probiotic Product (PROBIOTIC DAILY PO) Take by mouth 2 times daily      citalopram (CELEXA) 20 MG tablet Take 1 tablet by mouth daily      Multiple Vitamins-Minerals (THERAPEUTIC

## 2024-04-08 ENCOUNTER — OFFICE VISIT (OUTPATIENT)
Dept: FAMILY MEDICINE CLINIC | Age: 74
End: 2024-04-08
Payer: MEDICARE

## 2024-04-08 VITALS
SYSTOLIC BLOOD PRESSURE: 100 MMHG | HEART RATE: 95 BPM | OXYGEN SATURATION: 97 % | DIASTOLIC BLOOD PRESSURE: 56 MMHG | TEMPERATURE: 98.2 F | BODY MASS INDEX: 25 KG/M2 | HEIGHT: 59 IN | WEIGHT: 124 LBS | RESPIRATION RATE: 16 BRPM

## 2024-04-08 DIAGNOSIS — E03.9 ACQUIRED HYPOTHYROIDISM: ICD-10-CM

## 2024-04-08 DIAGNOSIS — Z12.31 ENCOUNTER FOR SCREENING MAMMOGRAM FOR BREAST CANCER: ICD-10-CM

## 2024-04-08 DIAGNOSIS — E78.00 PURE HYPERCHOLESTEROLEMIA: ICD-10-CM

## 2024-04-08 DIAGNOSIS — I10 PRIMARY HYPERTENSION: ICD-10-CM

## 2024-04-08 DIAGNOSIS — E11.9 TYPE 2 DIABETES MELLITUS WITHOUT COMPLICATION, WITHOUT LONG-TERM CURRENT USE OF INSULIN (HCC): Primary | ICD-10-CM

## 2024-04-08 PROCEDURE — 3078F DIAST BP <80 MM HG: CPT | Performed by: FAMILY MEDICINE

## 2024-04-08 PROCEDURE — 3051F HG A1C>EQUAL 7.0%<8.0%: CPT | Performed by: FAMILY MEDICINE

## 2024-04-08 PROCEDURE — 3074F SYST BP LT 130 MM HG: CPT | Performed by: FAMILY MEDICINE

## 2024-04-08 PROCEDURE — 1123F ACP DISCUSS/DSCN MKR DOCD: CPT | Performed by: FAMILY MEDICINE

## 2024-04-08 PROCEDURE — 99204 OFFICE O/P NEW MOD 45 MIN: CPT | Performed by: FAMILY MEDICINE

## 2024-04-08 RX ORDER — SEMAGLUTIDE 1.34 MG/ML
INJECTION, SOLUTION SUBCUTANEOUS
COMMUNITY
End: 2024-04-08

## 2024-04-08 RX ORDER — ATORVASTATIN CALCIUM 20 MG/1
20 TABLET, FILM COATED ORAL DAILY
Qty: 90 TABLET | Refills: 3 | Status: SHIPPED | OUTPATIENT
Start: 2024-04-08

## 2024-04-08 RX ORDER — ALLOPURINOL 100 MG/1
100 TABLET ORAL DAILY
COMMUNITY

## 2024-04-08 RX ORDER — COLCHICINE 0.6 MG/1
0.6 TABLET ORAL DAILY
COMMUNITY

## 2024-04-08 RX ORDER — LEVOTHYROXINE SODIUM 0.03 MG/1
TABLET ORAL
Qty: 135 TABLET | Refills: 0
Start: 2024-04-08

## 2024-04-08 RX ORDER — SULFASALAZINE 500 MG/1
500 TABLET, DELAYED RELEASE ORAL 2 TIMES DAILY
COMMUNITY
Start: 2024-03-09

## 2024-04-08 SDOH — HEALTH STABILITY: PHYSICAL HEALTH: ON AVERAGE, HOW MANY DAYS PER WEEK DO YOU ENGAGE IN MODERATE TO STRENUOUS EXERCISE (LIKE A BRISK WALK)?: 1 DAY

## 2024-04-08 SDOH — ECONOMIC STABILITY: FOOD INSECURITY: WITHIN THE PAST 12 MONTHS, THE FOOD YOU BOUGHT JUST DIDN'T LAST AND YOU DIDN'T HAVE MONEY TO GET MORE.: NEVER TRUE

## 2024-04-08 SDOH — ECONOMIC STABILITY: HOUSING INSECURITY
IN THE LAST 12 MONTHS, WAS THERE A TIME WHEN YOU DID NOT HAVE A STEADY PLACE TO SLEEP OR SLEPT IN A SHELTER (INCLUDING NOW)?: NO

## 2024-04-08 SDOH — ECONOMIC STABILITY: INCOME INSECURITY: HOW HARD IS IT FOR YOU TO PAY FOR THE VERY BASICS LIKE FOOD, HOUSING, MEDICAL CARE, AND HEATING?: NOT HARD AT ALL

## 2024-04-08 SDOH — ECONOMIC STABILITY: FOOD INSECURITY: WITHIN THE PAST 12 MONTHS, YOU WORRIED THAT YOUR FOOD WOULD RUN OUT BEFORE YOU GOT MONEY TO BUY MORE.: NEVER TRUE

## 2024-04-08 ASSESSMENT — PATIENT HEALTH QUESTIONNAIRE - PHQ9
SUM OF ALL RESPONSES TO PHQ QUESTIONS 1-9: 0
SUM OF ALL RESPONSES TO PHQ QUESTIONS 1-9: 0
SUM OF ALL RESPONSES TO PHQ9 QUESTIONS 1 & 2: 0
1. LITTLE INTEREST OR PLEASURE IN DOING THINGS: NOT AT ALL
2. FEELING DOWN, DEPRESSED OR HOPELESS: NOT AT ALL
SUM OF ALL RESPONSES TO PHQ QUESTIONS 1-9: 0
SUM OF ALL RESPONSES TO PHQ QUESTIONS 1-9: 0

## 2024-05-01 NOTE — PROGRESS NOTES
[facility]  [address]  [place] [address]  Phone:  [phone]       PREOPERATIVE CLEARANCE     Name: Rosa Isela Short  : 1950        Chief Complaint:     Chief Complaint   Patient presents with    Pre-op Exam     Cervical [facility] Dr. Liu        History of Present Illness:     The patient is presenting today for preoperative clearance for an ACDF C4-7 with Dr. Liu at [facility] on 24.  She has had pain in her neck for over 4 years that she put off as she was caring for her mother.      Last week she saw Dr. Lopez in [facility] for a lesion in near her left shoulder.  She will have a CTA chest on  for further evaluation.    Functional capacity:  > 4 mets (can vacuum/do housework, climb a flight of stairs without dyspnea)?:  Yes    Stress test or cardiac cath within last 5 years?:  No  Revascularization in last 5 years?:  No  Any current cardiac symptoms?:  No  Smoker?:  No  Prior adverse reaction to anesthesia?:  No  Sleep apnea?:  No    Labs reviewed:       CBC:  WNL       BMP:  WNL      Past Medical History:     Past Medical History:   Diagnosis Date    Anxiety     Blood transfusion 2009    2 units after right knee surgery for Hgb 8.0    Diverticulosis of colon     Hyperlipidemia     Hypertension     Kidney stones     Localized, primary osteoarthritis of shoulder region 2019    Obstruction of ascending colon (HCC) 2022    T2DM (type 2 diabetes mellitus) ([facility])         Past Surgical History:     Past Surgical History:   Procedure Laterality Date    CARPAL TUNNEL RELEASE Right     CHOLECYSTECTOMY      COLONOSCOPY      ENDOSCOPY, COLON, DIAGNOSTIC      FOOT SURGERY Bilateral 2004    HEMICOLECTOMY N/A 2022    EXPLORATORY LAPAROTOMY WITH BOWEL RESECTION, APPENDECTOMY  performed by Rossy Sexton MD at [facility] OR    HYSTERECTOMY (CERVIX STATUS UNKNOWN)  1996    JOINT REPLACEMENT Right 2009    knee    LITHOTRIPSY      MECKEL DIVERTICULUM EXCISION

## 2024-05-06 ENCOUNTER — TELEPHONE (OUTPATIENT)
Dept: FAMILY MEDICINE CLINIC | Age: 74
End: 2024-05-06

## 2024-05-06 ENCOUNTER — OFFICE VISIT (OUTPATIENT)
Dept: FAMILY MEDICINE CLINIC | Age: 74
End: 2024-05-06

## 2024-05-06 VITALS
HEART RATE: 96 BPM | DIASTOLIC BLOOD PRESSURE: 70 MMHG | WEIGHT: 120 LBS | TEMPERATURE: 97.6 F | RESPIRATION RATE: 15 BRPM | SYSTOLIC BLOOD PRESSURE: 116 MMHG | BODY MASS INDEX: 24.22 KG/M2

## 2024-05-06 DIAGNOSIS — M54.2 CHRONIC NECK PAIN: ICD-10-CM

## 2024-05-06 DIAGNOSIS — G89.29 CHRONIC NECK PAIN: ICD-10-CM

## 2024-05-06 DIAGNOSIS — Z01.818 PREOPERATIVE CLEARANCE: Primary | ICD-10-CM

## 2024-05-06 NOTE — TELEPHONE ENCOUNTER
----- Message from Zora King MD sent at 5/6/2024  9:21 AM EDT -----  Regarding: Preop testing  Please obtain UA (5/2) and EKG (5/1) from [facility] for her surgery Wednesday.

## 2024-05-23 RX ORDER — ONDANSETRON 4 MG/1
4 TABLET, FILM COATED ORAL EVERY 8 HOURS PRN
Qty: 30 TABLET | Refills: 2 | Status: SHIPPED | OUTPATIENT
Start: 2024-05-23

## 2024-05-23 RX ORDER — ONDANSETRON 4 MG/1
4 TABLET, FILM COATED ORAL EVERY 8 HOURS PRN
COMMUNITY
End: 2024-05-23 | Stop reason: SDUPTHER

## 2024-05-23 NOTE — TELEPHONE ENCOUNTER
Patient is on Ozempic and was given a steroid from her neck surgeon. Today after taking her steroids she was very nauseous and vomited a few times. Patient also c/o losing too much weight since April. Writer advised patient to take Zofran to help with the nausea and vomiting. She will need a refill on this med, please.

## 2024-06-05 ENCOUNTER — NURSE ONLY (OUTPATIENT)
Dept: LAB | Age: 74
End: 2024-06-05

## 2024-06-05 LAB
BASOPHILS ABSOLUTE: 0 THOU/MM3 (ref 0–0.1)
BASOPHILS NFR BLD AUTO: 0.4 %
DEPRECATED RDW RBC AUTO: 49.8 FL (ref 35–45)
EOSINOPHIL NFR BLD AUTO: 1.9 %
EOSINOPHILS ABSOLUTE: 0.1 THOU/MM3 (ref 0–0.4)
ERYTHROCYTE [DISTWIDTH] IN BLOOD BY AUTOMATED COUNT: 13.6 % (ref 11.5–14.5)
HCT VFR BLD AUTO: 36.6 % (ref 37–47)
HGB BLD-MCNC: 12 GM/DL (ref 12–16)
IMM GRANULOCYTES # BLD AUTO: 0.01 THOU/MM3 (ref 0–0.07)
IMM GRANULOCYTES NFR BLD AUTO: 0.2 %
LYMPHOCYTES ABSOLUTE: 1.7 THOU/MM3 (ref 1–4.8)
LYMPHOCYTES NFR BLD AUTO: 32.2 %
MCH RBC QN AUTO: 32.7 PG (ref 26–33)
MCHC RBC AUTO-ENTMCNC: 32.8 GM/DL (ref 32.2–35.5)
MCV RBC AUTO: 99.7 FL (ref 81–99)
MONOCYTES NFR BLD AUTO: 6.9 %
NEUTROPHILS ABSOLUTE: 3.1 THOU/MM3 (ref 1.8–7.7)
NEUTROPHILS NFR BLD AUTO: 58.4 %
NRBC BLD AUTO-RTO: 0 /100 WBC
PLATELET # BLD AUTO: 281 THOU/MM3 (ref 130–400)
PMV BLD AUTO: 10.8 FL (ref 9.4–12.4)
RBC # BLD AUTO: 3.67 MILL/MM3 (ref 4.2–5.4)
WBC # BLD AUTO: 5.3 THOU/MM3 (ref 4.8–10.8)

## 2024-06-06 LAB
ALT SERPL W/O P-5'-P-CCNC: 13 U/L (ref 11–66)
CREAT SERPL-MCNC: 0.7 MG/DL (ref 0.4–1.2)
ERYTHROCYTE [SEDIMENTATION RATE] IN BLOOD BY WESTERGREN METHOD: 14 MM/HR (ref 0–20)
FERRITIN SERPL IA-MCNC: 197 NG/ML (ref 10–291)
FOLATE SERPL-MCNC: > 20 NG/ML (ref 4.8–24.2)
GFR SERPL CREATININE-BSD FRML MDRD: > 90 ML/MIN/1.73M2
IRON SATN MFR SERPL: 21 % (ref 20–50)
IRON SERPL-MCNC: 47 UG/DL (ref 50–170)
TIBC SERPL-MCNC: 221 UG/DL (ref 171–450)
URATE SERPL-MCNC: 4.3 MG/DL (ref 2.4–5.7)
VIT B12 SERPL-MCNC: 451 PG/ML (ref 211–911)

## 2024-06-11 ENCOUNTER — NURSE ONLY (OUTPATIENT)
Dept: FAMILY MEDICINE CLINIC | Age: 74
End: 2024-06-11
Payer: MEDICARE

## 2024-06-11 DIAGNOSIS — R31.9 HEMATURIA, UNSPECIFIED TYPE: ICD-10-CM

## 2024-06-11 DIAGNOSIS — N30.01 ACUTE CYSTITIS WITH HEMATURIA: Primary | ICD-10-CM

## 2024-06-11 LAB
BILIRUBIN, POC: NEGATIVE
BLOOD URINE, POC: NORMAL
CLARITY, POC: CLEAR
COLOR, POC: YELLOW
GLUCOSE URINE, POC: NEGATIVE
KETONES, POC: NEGATIVE
LEUKOCYTE EST, POC: NORMAL
NITRITE, POC: NEGATIVE
PH, POC: 7.5
PROTEIN, POC: 30
SPECIFIC GRAVITY, POC: 1.02
UROBILINOGEN, POC: 0.2

## 2024-06-11 PROCEDURE — 1123F ACP DISCUSS/DSCN MKR DOCD: CPT | Performed by: FAMILY MEDICINE

## 2024-06-11 PROCEDURE — 81003 URINALYSIS AUTO W/O SCOPE: CPT | Performed by: FAMILY MEDICINE

## 2024-06-11 PROCEDURE — 99212 OFFICE O/P EST SF 10 MIN: CPT | Performed by: FAMILY MEDICINE

## 2024-06-11 RX ORDER — CEPHALEXIN 500 MG/1
500 CAPSULE ORAL 2 TIMES DAILY
Qty: 10 CAPSULE | Refills: 0 | Status: SHIPPED | OUTPATIENT
Start: 2024-06-11 | End: 2024-06-16

## 2024-06-11 NOTE — PROGRESS NOTES
Patient c/o blood in urine, lower abdominal pain, and pelvic pressure for 4 days.    Rosa Isela was seen today for hematuria and abdominal pain.    Diagnoses and all orders for this visit:    Acute cystitis with hematuria  -     cephALEXin (KEFLEX) 500 MG capsule; Take 1 capsule by mouth 2 times daily for 5 days  -     POCT Urinalysis No Micro (Auto)      Electronically signed by Zora King MD on 6/11/24.

## 2024-06-11 NOTE — PROGRESS NOTES
Patient c/o blood in urine, lower abdominal pain, and pelvic pressure. Pt states this has been going on for 4 days. NKDA. Pt uses [facility] in [place].

## 2024-06-13 LAB
BACTERIA UR CULT: ABNORMAL
ORGANISM: ABNORMAL

## 2024-06-20 ENCOUNTER — NURSE ONLY (OUTPATIENT)
Dept: FAMILY MEDICINE CLINIC | Age: 74
End: 2024-06-20

## 2024-06-20 ENCOUNTER — TELEPHONE (OUTPATIENT)
Dept: FAMILY MEDICINE CLINIC | Age: 74
End: 2024-06-20

## 2024-06-20 DIAGNOSIS — R31.9 HEMATURIA, UNSPECIFIED TYPE: Primary | ICD-10-CM

## 2024-06-20 LAB
BILIRUBIN, URINE: NEGATIVE
BLOOD URINE, POC: ABNORMAL
CHARACTER, URINE: CLEAR
COLOR: ABNORMAL
GLUCOSE URINE: NEGATIVE MG/DL
KETONES, URINE: NEGATIVE
LEUKOCYTE CLUMPS, URINE: NEGATIVE
NITRITE, URINE: NEGATIVE
PH, URINE: 7 (ref 5–9)
PROTEIN, URINE: NEGATIVE MG/DL
SPECIFIC GRAVITY UA: >= 1.03 (ref 1–1.03)
UROBILINOGEN, URINE: 0.2 EU/DL (ref 0–1)

## 2024-06-20 PROCEDURE — 99999 PR OFFICE/OUTPT VISIT,PROCEDURE ONLY: CPT | Performed by: FAMILY MEDICINE

## 2024-06-20 NOTE — TELEPHONE ENCOUNTER
Pt called stating that she woke up again this AM and had blood in her urine and has pelvic pain again. She stated that she finished the ATB that she was given for UTI sx's on 6/11. She stated she was fine for a few days and her sx's have started again. Do you want her to come in for a nurse visit to have her urine dipped again? Please advise.

## 2024-06-22 LAB
BACTERIA UR CULT: ABNORMAL
ORGANISM: ABNORMAL

## 2024-07-29 DIAGNOSIS — E03.9 ACQUIRED HYPOTHYROIDISM: ICD-10-CM

## 2024-07-29 RX ORDER — LEVOTHYROXINE SODIUM 0.03 MG/1
TABLET ORAL
Qty: 135 TABLET | Refills: 0 | Status: SHIPPED | OUTPATIENT
Start: 2024-07-29

## 2024-07-29 NOTE — TELEPHONE ENCOUNTER
Last visit- 5/6/2024  Next visit- 10/8/2024    Requested Prescriptions     Pending Prescriptions Disp Refills    levothyroxine (SYNTHROID) 25 MCG tablet 135 tablet 0     Sig: Take 50 mcg on M/W/F and 25 mcg all other days.

## 2024-09-04 ENCOUNTER — LAB (OUTPATIENT)
Dept: LAB | Age: 74
End: 2024-09-04

## 2024-09-04 LAB
BASOPHILS ABSOLUTE: 0 THOU/MM3 (ref 0–0.1)
BASOPHILS NFR BLD AUTO: 0.2 %
DEPRECATED RDW RBC AUTO: 47.6 FL (ref 35–45)
EOSINOPHIL NFR BLD AUTO: 0.9 %
EOSINOPHILS ABSOLUTE: 0.1 THOU/MM3 (ref 0–0.4)
ERYTHROCYTE [DISTWIDTH] IN BLOOD BY AUTOMATED COUNT: 13.1 % (ref 11.5–14.5)
HCT VFR BLD AUTO: 35.8 % (ref 37–47)
HGB BLD-MCNC: 11.7 GM/DL (ref 12–16)
IMM GRANULOCYTES # BLD AUTO: 0.01 THOU/MM3 (ref 0–0.07)
IMM GRANULOCYTES NFR BLD AUTO: 0.2 %
LYMPHOCYTES ABSOLUTE: 2 THOU/MM3 (ref 1–4.8)
LYMPHOCYTES NFR BLD AUTO: 36 %
MCH RBC QN AUTO: 32.5 PG (ref 26–33)
MCHC RBC AUTO-ENTMCNC: 32.7 GM/DL (ref 32.2–35.5)
MCV RBC AUTO: 99.4 FL (ref 81–99)
MONOCYTES ABSOLUTE: 0.4 THOU/MM3 (ref 0.4–1.3)
MONOCYTES NFR BLD AUTO: 6.4 %
NEUTROPHILS ABSOLUTE: 3.2 THOU/MM3 (ref 1.8–7.7)
NEUTROPHILS NFR BLD AUTO: 56.3 %
NRBC BLD AUTO-RTO: 0 /100 WBC
PLATELET # BLD AUTO: 287 THOU/MM3 (ref 130–400)
PMV BLD AUTO: 10.5 FL (ref 9.4–12.4)
RBC # BLD AUTO: 3.6 MILL/MM3 (ref 4.2–5.4)
WBC # BLD AUTO: 5.6 THOU/MM3 (ref 4.8–10.8)

## 2024-09-05 LAB
ALT SERPL W/O P-5'-P-CCNC: 12 U/L (ref 11–66)
CREAT SERPL-MCNC: 0.7 MG/DL (ref 0.4–1.2)
ERYTHROCYTE [SEDIMENTATION RATE] IN BLOOD BY WESTERGREN METHOD: 15 MM/HR (ref 0–20)
GFR SERPL CREATININE-BSD FRML MDRD: > 90 ML/MIN/1.73M2
URATE SERPL-MCNC: 3.6 MG/DL (ref 2.4–5.7)

## 2024-09-07 LAB — NUCLEAR IGG SER QL IA: DETECTED

## 2024-09-08 LAB
ANA PAT SER IF-IMP: ABNORMAL
ANA PAT SER IF-IMP: ABNORMAL
NUCLEAR IGG SER QL IF: DETECTED
NUCLEAR IGG TITR SER IF: ABNORMAL {TITER}

## 2024-09-10 LAB — CENTROMERE ANTIBODY: 88 U/ML

## 2024-09-11 ENCOUNTER — HOSPITAL ENCOUNTER (OUTPATIENT)
Age: 74
Discharge: HOME | End: 2024-09-11
Payer: MEDICARE

## 2024-09-11 ENCOUNTER — HOSPITAL ENCOUNTER (OUTPATIENT)
Dept: GENERAL RADIOLOGY | Age: 74
Discharge: HOME | End: 2024-09-11
Payer: MEDICARE

## 2024-09-11 DIAGNOSIS — M15.0 PRIMARY GENERALIZED (OSTEO)ARTHRITIS: ICD-10-CM

## 2024-09-11 PROCEDURE — 73502 X-RAY EXAM HIP UNI 2-3 VIEWS: CPT

## 2024-09-18 ENCOUNTER — HOSPITAL ENCOUNTER (EMERGENCY)
Age: 74
Discharge: HOME | End: 2024-09-18
Attending: EMERGENCY MEDICINE
Payer: MEDICARE

## 2024-09-18 ENCOUNTER — APPOINTMENT (OUTPATIENT)
Dept: CT IMAGING | Age: 74
End: 2024-09-18
Attending: EMERGENCY MEDICINE
Payer: MEDICARE

## 2024-09-18 VITALS
TEMPERATURE: 97.8 F | HEART RATE: 96 BPM | BODY MASS INDEX: 22.98 KG/M2 | OXYGEN SATURATION: 100 % | HEIGHT: 59 IN | RESPIRATION RATE: 21 BRPM | SYSTOLIC BLOOD PRESSURE: 113 MMHG | WEIGHT: 114 LBS | DIASTOLIC BLOOD PRESSURE: 60 MMHG

## 2024-09-18 DIAGNOSIS — E86.0 DEHYDRATION: ICD-10-CM

## 2024-09-18 DIAGNOSIS — R55 SYNCOPE, UNSPECIFIED SYNCOPE TYPE: Primary | ICD-10-CM

## 2024-09-18 LAB
ALBUMIN SERPL BCP-MCNC: 4.2 GM/DL (ref 3.4–5)
ALP SERPL-CCNC: 81 U/L (ref 46–116)
ALT SERPL W P-5'-P-CCNC: 15 U/L (ref 14–63)
ANION GAP SERPL CALC-SCNC: 15 MEQ/L (ref 8–16)
AST SERPL W P-5'-P-CCNC: 21 U/L (ref 15–37)
BASOPHILS # BLD: 0.3 % (ref 0–3)
BASOPHILS ABSOLUTE: 0 THOU/MM3 (ref 0–0.1)
BILIRUB SERPL-MCNC: 0.4 MG/DL (ref 0.2–1)
BILIRUB UR QL STRIP.AUTO: NEGATIVE
BUN SERPL-MCNC: 13 MG/DL (ref 7–18)
CALCIUM SERPL-MCNC: 9.7 MG/DL (ref 8.5–10.1)
CHARACTER UR: CLEAR
CHLORIDE SERPL-SCNC: 101 MEQ/L (ref 98–107)
CO2 SERPL-SCNC: 24 MEQ/L (ref 21–32)
COLOR UR AUTO: YELLOW
CREAT SERPL-MCNC: 1.4 MG/DL (ref 0.6–1.3)
EKG ATRIAL RATE: 102 BPM
EKG P AXIS: 63 DEGREES
EKG P-R INTERVAL: 154 MS
EKG Q-T INTERVAL: 340 MS
EKG QRS DURATION: 60 MS
EKG QTC CALCULATION (BAZETT): 443 MS
EKG R AXIS: -44 DEGREES
EKG T AXIS: 55 DEGREES
EKG VENTRICULAR RATE: 102 BPM
EOSINOPHILS ABSOLUTE: 0 THOU/MM3 (ref 0–0.5)
EOSINOPHILS RELATIVE PERCENT: 0.3 % (ref 0–4)
GFR SERPL CREATININE-BSD FRML MDRD: 40 ML/MIN/1.73M2
GLUCOSE BLD STRIP.AUTO-MCNC: 141 MG/DL (ref 70–108)
GLUCOSE SERPL-MCNC: 149 MG/DL (ref 74–106)
GLUCOSE UR QL STRIP.AUTO: NEGATIVE MG/DL
HCT VFR BLD CALC: 37.9 % (ref 37–47)
HEMOGLOBIN: 12.3 GM/DL (ref 12–16)
HGB UR STRIP.AUTO-MCNC: NEGATIVE MG/L
IMMATURE GRANS (ABS): 0 THOU/MM3 (ref 0–0.07)
IMMATURE GRANULOCYTES %: 0 %
KETONES UR QL STRIP.AUTO: NEGATIVE
LEUKOCYTE ESTERASE UR QL STRIP.AUTO: NEGATIVE
LIPASE SERPL-CCNC: 45 U/L (ref 16–77)
LYMPHOCYTES # BLD AUTO: 27.2 % (ref 15–47)
LYMPHOCYTES ABSOLUTE: 2.1 THOU/MM3 (ref 1–4.8)
MCH RBC QN AUTO: 32 PG (ref 26–32)
MCHC RBC AUTO-ENTMCNC: 32.5 GM/DL (ref 31–35)
MCV RBC AUTO: 98.7 FL (ref 81–99)
MONOCYTES: 0.3 THOU/MM3 (ref 0.3–1.3)
MONOCYTES: 4.4 % (ref 0–12)
NEUTROPHILS ABSOLUTE: 5.3 THOU/MM3 (ref 1.8–7.7)
NITRITE UR QL STRIP.AUTO: NEGATIVE
PDW BLD-RTO: 13 % (ref 11.5–14.9)
PH UR STRIP.AUTO: 6 [PH] (ref 5–9)
PLATELET # BLD AUTO: 281 THOU/MM3 (ref 130–400)
PMV BLD AUTO: 9.7 FL (ref 9.4–12.4)
POTASSIUM SERPL-SCNC: 4 MEQ/L (ref 3.5–5.1)
PROT SERPL-MCNC: 7.7 GM/DL (ref 6.4–8.2)
PROT UR STRIP.AUTO-MCNC: NEGATIVE MG/DL
RBC # BLD: 3.84 MILL/MM3 (ref 4.1–5.3)
REFLEX TO URINE C & S: NORMAL
SEG NEUTROPHILS: 67.5 % (ref 43–75)
SODIUM SERPL-SCNC: 140 MEQ/L (ref 136–145)
SP GR UR STRIP.AUTO: 1.01 (ref 1–1.03)
TROPONIN, HIGH SENSITIVITY: < 4 PG/ML (ref 0–51.3)
UROBILINOGEN UR STRIP-ACNC: 0.2 EU/DL (ref 0–1)
WBC # BLD: 7.8 THOU/MM3 (ref 4.8–10.8)

## 2024-09-18 PROCEDURE — 93010 ELECTROCARDIOGRAM REPORT: CPT | Performed by: INTERNAL MEDICINE

## 2024-09-18 PROCEDURE — 2580000003 HC RX 258: Performed by: EMERGENCY MEDICINE

## 2024-09-18 PROCEDURE — 83690 ASSAY OF LIPASE: CPT

## 2024-09-18 PROCEDURE — 84484 ASSAY OF TROPONIN QUANT: CPT

## 2024-09-18 PROCEDURE — 71275 CT ANGIOGRAPHY CHEST: CPT

## 2024-09-18 PROCEDURE — 99285 EMERGENCY DEPT VISIT HI MDM: CPT

## 2024-09-18 PROCEDURE — 85025 COMPLETE CBC W/AUTO DIFF WBC: CPT

## 2024-09-18 PROCEDURE — 96361 HYDRATE IV INFUSION ADD-ON: CPT

## 2024-09-18 PROCEDURE — 82948 REAGENT STRIP/BLOOD GLUCOSE: CPT

## 2024-09-18 PROCEDURE — 81001 URINALYSIS AUTO W/SCOPE: CPT

## 2024-09-18 PROCEDURE — 80053 COMPREHEN METABOLIC PANEL: CPT

## 2024-09-18 PROCEDURE — 96374 THER/PROPH/DIAG INJ IV PUSH: CPT

## 2024-09-18 PROCEDURE — 6360000004 HC RX CONTRAST MEDICATION: Performed by: EMERGENCY MEDICINE

## 2024-09-18 PROCEDURE — 6360000002 HC RX W HCPCS: Performed by: EMERGENCY MEDICINE

## 2024-09-18 PROCEDURE — 93005 ELECTROCARDIOGRAM TRACING: CPT | Performed by: EMERGENCY MEDICINE

## 2024-09-18 RX ORDER — ONDANSETRON 2 MG/ML
4 INJECTION INTRAMUSCULAR; INTRAVENOUS ONCE
Status: COMPLETED | OUTPATIENT
Start: 2024-09-18 | End: 2024-09-18

## 2024-09-18 RX ORDER — IOPAMIDOL 755 MG/ML
75 INJECTION, SOLUTION INTRAVASCULAR
Status: COMPLETED | OUTPATIENT
Start: 2024-09-18 | End: 2024-09-18

## 2024-09-18 RX ORDER — 0.9 % SODIUM CHLORIDE 0.9 %
1000 INTRAVENOUS SOLUTION INTRAVENOUS ONCE
Status: COMPLETED | OUTPATIENT
Start: 2024-09-18 | End: 2024-09-18

## 2024-09-18 RX ORDER — ASPIRIN 81 MG/1
81 TABLET ORAL DAILY
COMMUNITY

## 2024-09-18 RX ADMIN — SODIUM CHLORIDE 1000 ML: 9 INJECTION, SOLUTION INTRAVENOUS at 11:42

## 2024-09-18 RX ADMIN — ONDANSETRON 4 MG: 2 INJECTION INTRAMUSCULAR; INTRAVENOUS at 12:16

## 2024-09-18 RX ADMIN — IOPAMIDOL 75 ML: 755 INJECTION, SOLUTION INTRAVENOUS at 11:35

## 2024-09-18 ASSESSMENT — PAIN DESCRIPTION - DESCRIPTORS
DESCRIPTORS: ACHING
DESCRIPTORS: ACHING

## 2024-09-18 ASSESSMENT — PAIN SCALES - GENERAL
PAINLEVEL_OUTOF10: 2
PAINLEVEL_OUTOF10: 4

## 2024-09-18 ASSESSMENT — PAIN - FUNCTIONAL ASSESSMENT
PAIN_FUNCTIONAL_ASSESSMENT: 0-10
PAIN_FUNCTIONAL_ASSESSMENT: 0-10

## 2024-09-18 ASSESSMENT — PAIN DESCRIPTION - ORIENTATION
ORIENTATION: LEFT
ORIENTATION: LEFT

## 2024-09-18 ASSESSMENT — PAIN DESCRIPTION - LOCATION
LOCATION: BACK;SHOULDER
LOCATION: BACK;SHOULDER

## 2024-09-18 ASSESSMENT — PAIN DESCRIPTION - PAIN TYPE: TYPE: ACUTE PAIN

## 2024-09-20 ENCOUNTER — CARE COORDINATION (OUTPATIENT)
Dept: CARE COORDINATION | Age: 74
End: 2024-09-20

## 2024-09-24 ENCOUNTER — CARE COORDINATION (OUTPATIENT)
Dept: CARE COORDINATION | Age: 74
End: 2024-09-24

## 2024-09-26 ENCOUNTER — CARE COORDINATION (OUTPATIENT)
Dept: CARE COORDINATION | Age: 74
End: 2024-09-26

## 2024-09-29 NOTE — PROGRESS NOTES
MG/3ML SOPN, Inject 1 mg into the skin once a week, Disp: 3 mL, Rfl: 2    metFORMIN (GLUCOPHAGE-XR) 500 MG extended release tablet, Take 2 tablets by mouth 2 times daily, Disp: , Rfl:     tolterodine (DETROL) 1 MG tablet, Take 1 tablet by mouth 2 times daily, Disp: , Rfl:     Probiotic Product (PROBIOTIC DAILY PO), Take by mouth 2 times daily, Disp: , Rfl:     citalopram (CELEXA) 20 MG tablet, Take 1 tablet by mouth daily, Disp: , Rfl:     Multiple Vitamins-Minerals (THERAPEUTIC MULTIVITAMIN-MINERALS) tablet, Take 1 tablet by mouth daily, Disp: , Rfl:     lisinopril (PRINIVIL;ZESTRIL) 2.5 MG tablet, Take 1 tablet by mouth daily, Disp: , Rfl:     Allergies   Allergen Reactions    Adhesive Tape        Subjective:      Review of Systems   Respiratory:  Negative for shortness of breath.    Cardiovascular:  Negative for chest pain and palpitations.   Gastrointestinal:  Positive for diarrhea, nausea and vomiting.   Neurological:  Positive for syncope. Negative for dizziness, weakness and light-headedness.       Objective:     /60 (Site: Left Upper Arm, Position: Sitting, Cuff Size: Medium Adult)   Pulse 96   Temp 97.5 °F (36.4 °C) (Temporal)   Resp 17   Ht 1.499 m (4' 11.02\")   Wt 51 kg (112 lb 6.4 oz)   SpO2 100%   BMI 22.69 kg/m²     Physical Exam  Vitals and nursing note reviewed.   Constitutional:       General: She is not in acute distress.     Appearance: She is well-developed.   HENT:      Head: Normocephalic and atraumatic.      Nose: Nose normal.   Eyes:      Conjunctiva/sclera: Conjunctivae normal.   Cardiovascular:      Rate and Rhythm: Normal rate and regular rhythm.      Heart sounds: Normal heart sounds.   Pulmonary:      Effort: Pulmonary effort is normal. No respiratory distress.      Breath sounds: Normal breath sounds. No wheezing.   Abdominal:      General: Bowel sounds are normal. There is no distension.      Palpations: Abdomen is soft.      Tenderness: There is no abdominal tenderness.

## 2024-10-01 ENCOUNTER — OFFICE VISIT (OUTPATIENT)
Dept: FAMILY MEDICINE CLINIC | Age: 74
End: 2024-10-01
Payer: MEDICARE

## 2024-10-01 ENCOUNTER — LAB (OUTPATIENT)
Dept: LAB | Age: 74
End: 2024-10-01

## 2024-10-01 VITALS
RESPIRATION RATE: 17 BRPM | HEART RATE: 96 BPM | SYSTOLIC BLOOD PRESSURE: 102 MMHG | HEIGHT: 59 IN | TEMPERATURE: 97.5 F | WEIGHT: 112.4 LBS | DIASTOLIC BLOOD PRESSURE: 60 MMHG | BODY MASS INDEX: 22.66 KG/M2 | OXYGEN SATURATION: 100 %

## 2024-10-01 DIAGNOSIS — E86.0 DEHYDRATION: ICD-10-CM

## 2024-10-01 DIAGNOSIS — R55 VASOVAGAL SYNCOPE: Primary | ICD-10-CM

## 2024-10-01 DIAGNOSIS — R55 VASOVAGAL SYNCOPE: ICD-10-CM

## 2024-10-01 DIAGNOSIS — N17.9 AKI (ACUTE KIDNEY INJURY) (HCC): ICD-10-CM

## 2024-10-01 LAB
ANION GAP SERPL CALC-SCNC: 14 MEQ/L (ref 8–16)
BUN SERPL-MCNC: 13 MG/DL (ref 7–22)
CALCIUM SERPL-MCNC: 10.2 MG/DL (ref 8.5–10.5)
CHLORIDE SERPL-SCNC: 99 MEQ/L (ref 98–111)
CO2 SERPL-SCNC: 23 MEQ/L (ref 23–33)
CREAT SERPL-MCNC: 0.8 MG/DL (ref 0.4–1.2)
GFR SERPL CREATININE-BSD FRML MDRD: 78 ML/MIN/1.73M2
GLUCOSE SERPL-MCNC: 102 MG/DL (ref 70–108)
POTASSIUM SERPL-SCNC: 5 MEQ/L (ref 3.5–5.2)
SODIUM SERPL-SCNC: 136 MEQ/L (ref 135–145)

## 2024-10-01 PROCEDURE — 99213 OFFICE O/P EST LOW 20 MIN: CPT | Performed by: FAMILY MEDICINE

## 2024-10-01 PROCEDURE — 1123F ACP DISCUSS/DSCN MKR DOCD: CPT | Performed by: FAMILY MEDICINE

## 2024-10-01 PROCEDURE — 3074F SYST BP LT 130 MM HG: CPT | Performed by: FAMILY MEDICINE

## 2024-10-01 PROCEDURE — 3078F DIAST BP <80 MM HG: CPT | Performed by: FAMILY MEDICINE

## 2024-10-01 ASSESSMENT — ENCOUNTER SYMPTOMS
VOMITING: 1
NAUSEA: 1
DIARRHEA: 1
SHORTNESS OF BREATH: 0

## 2024-10-03 ENCOUNTER — CARE COORDINATION (OUTPATIENT)
Dept: CARE COORDINATION | Age: 74
End: 2024-10-03

## 2024-10-09 ENCOUNTER — HOSPITAL ENCOUNTER (EMERGENCY)
Age: 74
Discharge: HOME | End: 2024-10-09
Attending: EMERGENCY MEDICINE
Payer: MEDICARE

## 2024-10-09 ENCOUNTER — APPOINTMENT (OUTPATIENT)
Dept: CT IMAGING | Age: 74
End: 2024-10-09
Attending: EMERGENCY MEDICINE
Payer: MEDICARE

## 2024-10-09 VITALS
OXYGEN SATURATION: 99 % | DIASTOLIC BLOOD PRESSURE: 63 MMHG | SYSTOLIC BLOOD PRESSURE: 106 MMHG | RESPIRATION RATE: 18 BRPM | BODY MASS INDEX: 22.18 KG/M2 | WEIGHT: 110 LBS | TEMPERATURE: 97.7 F | HEIGHT: 59 IN | HEART RATE: 74 BPM

## 2024-10-09 DIAGNOSIS — N39.0 URINARY TRACT INFECTION WITHOUT HEMATURIA, SITE UNSPECIFIED: ICD-10-CM

## 2024-10-09 DIAGNOSIS — R10.12 ABDOMINAL PAIN, LEFT UPPER QUADRANT: Primary | ICD-10-CM

## 2024-10-09 DIAGNOSIS — R11.2 NAUSEA AND VOMITING, UNSPECIFIED VOMITING TYPE: ICD-10-CM

## 2024-10-09 LAB
ALBUMIN SERPL BCP-MCNC: 4.1 GM/DL (ref 3.4–5)
ALP SERPL-CCNC: 74 U/L (ref 46–116)
ALT SERPL W P-5'-P-CCNC: 15 U/L (ref 14–63)
AMORPH SED URNS QL MICRO: ABNORMAL
ANION GAP SERPL CALC-SCNC: 13 MEQ/L (ref 8–16)
AST SERPL W P-5'-P-CCNC: 24 U/L (ref 15–37)
BACTERIA URNS QL MICRO: ABNORMAL
BASOPHILS # BLD: 0.2 % (ref 0–3)
BASOPHILS ABSOLUTE: 0 THOU/MM3 (ref 0–0.1)
BILIRUB SERPL-MCNC: 0.4 MG/DL (ref 0.2–1)
BILIRUB UR QL STRIP.AUTO: ABNORMAL
BUN SERPL-MCNC: 18 MG/DL (ref 7–18)
CALCIUM SERPL-MCNC: 10 MG/DL (ref 8.5–10.1)
CASTS #/AREA URNS LPF: ABNORMAL /LPF
CHARACTER UR: ABNORMAL
CHLORIDE SERPL-SCNC: 100 MEQ/L (ref 98–107)
CO2 SERPL-SCNC: 25 MEQ/L (ref 21–32)
COLOR UR AUTO: ABNORMAL
CREAT SERPL-MCNC: 1.2 MG/DL (ref 0.6–1.3)
CRYSTALS VITF MICRO: ABNORMAL
EOSINOPHILS ABSOLUTE: 0 THOU/MM3 (ref 0–0.5)
EOSINOPHILS RELATIVE PERCENT: 0.6 % (ref 0–4)
EPI CELLS #/AREA URNS HPF: ABNORMAL /HPF
GFR SERPL CREATININE-BSD FRML MDRD: 48 ML/MIN/1.73M2
GLUCOSE SERPL-MCNC: 121 MG/DL (ref 74–106)
GLUCOSE UR QL STRIP.AUTO: NEGATIVE MG/DL
HCT VFR BLD CALC: 34.3 % (ref 37–47)
HEMOGLOBIN: 11.3 GM/DL (ref 12–16)
HGB UR STRIP.AUTO-MCNC: NEGATIVE MG/L
IMMATURE GRANS (ABS): 0 THOU/MM3 (ref 0–0.07)
IMMATURE GRANULOCYTES %: 0 %
KETONES UR QL STRIP.AUTO: ABNORMAL
LEUKOCYTE ESTERASE UR QL STRIP.AUTO: ABNORMAL
LIPASE SERPL-CCNC: 47 U/L (ref 16–77)
LYMPHOCYTES # BLD AUTO: 21.3 % (ref 15–47)
LYMPHOCYTES ABSOLUTE: 1.4 THOU/MM3 (ref 1–4.8)
MCH RBC QN AUTO: 32.3 PG (ref 26–32)
MCHC RBC AUTO-ENTMCNC: 32.9 GM/DL (ref 31–35)
MCV RBC AUTO: 98 FL (ref 81–99)
MONOCYTES: 0.4 THOU/MM3 (ref 0.3–1.3)
MONOCYTES: 5.8 % (ref 0–12)
MUCOUS THREADS URNS QL MICRO: ABNORMAL
NEUTROPHILS ABSOLUTE: 4.7 THOU/MM3 (ref 1.8–7.7)
NITRITE UR QL STRIP.AUTO: NEGATIVE
PDW BLD-RTO: 13.2 % (ref 11.5–14.9)
PH UR STRIP.AUTO: 5.5 [PH] (ref 5–9)
PLATELET # BLD AUTO: 249 THOU/MM3 (ref 130–400)
PMV BLD AUTO: 9.5 FL (ref 9.4–12.4)
POTASSIUM SERPL-SCNC: 4.2 MEQ/L (ref 3.5–5.1)
PROT SERPL-MCNC: 7.4 GM/DL (ref 6.4–8.2)
PROT UR STRIP.AUTO-MCNC: 30 MG/DL
RBC # BLD: 3.5 MILL/MM3 (ref 4.1–5.3)
RBC #/AREA URNS HPF: ABNORMAL /HPF
REFLEX TO URINE C & S: ABNORMAL
SEG NEUTROPHILS: 71.9 % (ref 43–75)
SODIUM SERPL-SCNC: 138 MEQ/L (ref 136–145)
SP GR UR STRIP.AUTO: 1.02 (ref 1–1.03)
UROBILINOGEN UR STRIP-ACNC: 0.2 EU/DL (ref 0–1)
WBC # BLD: 6.5 THOU/MM3 (ref 4.8–10.8)
WBC # UR STRIP.AUTO: ABNORMAL /HPF

## 2024-10-09 PROCEDURE — 87086 URINE CULTURE/COLONY COUNT: CPT

## 2024-10-09 PROCEDURE — 6370000000 HC RX 637 (ALT 250 FOR IP): Performed by: EMERGENCY MEDICINE

## 2024-10-09 PROCEDURE — 81001 URINALYSIS AUTO W/SCOPE: CPT

## 2024-10-09 PROCEDURE — 83690 ASSAY OF LIPASE: CPT

## 2024-10-09 PROCEDURE — 96375 TX/PRO/DX INJ NEW DRUG ADDON: CPT

## 2024-10-09 PROCEDURE — 96374 THER/PROPH/DIAG INJ IV PUSH: CPT

## 2024-10-09 PROCEDURE — 99284 EMERGENCY DEPT VISIT MOD MDM: CPT

## 2024-10-09 PROCEDURE — 80053 COMPREHEN METABOLIC PANEL: CPT

## 2024-10-09 PROCEDURE — 6360000002 HC RX W HCPCS: Performed by: EMERGENCY MEDICINE

## 2024-10-09 PROCEDURE — 85025 COMPLETE CBC W/AUTO DIFF WBC: CPT

## 2024-10-09 PROCEDURE — 74176 CT ABD & PELVIS W/O CONTRAST: CPT

## 2024-10-09 RX ORDER — ONDANSETRON 2 MG/ML
4 INJECTION INTRAMUSCULAR; INTRAVENOUS ONCE
Status: COMPLETED | OUTPATIENT
Start: 2024-10-09 | End: 2024-10-09

## 2024-10-09 RX ORDER — HYDROCODONE BITARTRATE AND ACETAMINOPHEN 5; 325 MG/1; MG/1
1 TABLET ORAL EVERY 6 HOURS PRN
Qty: 12 TABLET | Refills: 0 | Status: SHIPPED | OUTPATIENT
Start: 2024-10-09 | End: 2024-10-12

## 2024-10-09 RX ORDER — CEPHALEXIN 500 MG/1
500 CAPSULE ORAL ONCE
Status: COMPLETED | OUTPATIENT
Start: 2024-10-09 | End: 2024-10-09

## 2024-10-09 RX ORDER — CEPHALEXIN 500 MG/1
500 CAPSULE ORAL 3 TIMES DAILY
Qty: 30 CAPSULE | Refills: 0 | Status: SHIPPED | OUTPATIENT
Start: 2024-10-09 | End: 2024-10-19

## 2024-10-09 RX ADMIN — CEPHALEXIN 500 MG: 500 CAPSULE ORAL at 15:24

## 2024-10-09 RX ADMIN — HYDROMORPHONE HYDROCHLORIDE 0.25 MG: 1 INJECTION, SOLUTION INTRAMUSCULAR; INTRAVENOUS; SUBCUTANEOUS at 13:05

## 2024-10-09 RX ADMIN — ONDANSETRON 4 MG: 2 INJECTION INTRAMUSCULAR; INTRAVENOUS at 13:05

## 2024-10-09 ASSESSMENT — PAIN SCALES - GENERAL
PAINLEVEL_OUTOF10: 2
PAINLEVEL_OUTOF10: 8

## 2024-10-09 ASSESSMENT — PAIN - FUNCTIONAL ASSESSMENT
PAIN_FUNCTIONAL_ASSESSMENT: 0-10
PAIN_FUNCTIONAL_ASSESSMENT: 0-10

## 2024-10-09 NOTE — ED PROVIDER NOTES
diverticulosis without diverticulitis is observed.      2. Numerous chronic findings are discussed.            **This report has been created using voice recognition software.  It may contain   minor errors which are inherent in voice recognition technology.**      Electronically signed by Dr. Diana Torres          LABS:     Labs Reviewed   CBC WITH AUTO DIFFERENTIAL - Abnormal; Notable for the following components:       Result Value    RBC 3.50 (*)     Hemoglobin 11.3 (*)     Hematocrit 34.3 (*)     MCH 32.3 (*)     All other components within normal limits   COMPREHENSIVE METABOLIC PANEL - Abnormal; Notable for the following components:    Glucose 121 (*)     All other components within normal limits   URINALYSIS WITH REFLEX TO CULTURE - Abnormal; Notable for the following components:    Bilirubin, Urine LARGE (*)     Ketones, Urine TRACE (*)     Protein, UA 30 (*)     Leukocyte Esterase, Urine LARGE (*)     Color, UA DARK YELLOW (*)     All other components within normal limits   GLOMERULAR FILTRATION RATE, ESTIMATED - Abnormal; Notable for the following components:    Est, Glom Filt Rate 48 (*)     All other components within normal limits   CULTURE, REFLEXED, URINE   LIPASE   ANION GAP       Vitals:    Vitals:    10/09/24 1224 10/09/24 1337 10/09/24 1513   BP: 126/62 (!) 98/53 106/63   Pulse: (!) 107 85 74   Resp: 18 18 18   Temp: 97.7 °F (36.5 °C)     TempSrc: Oral     SpO2: 99% 99% 99%   Weight: 49.9 kg (110 lb)     Height: 1.499 m (4' 11\")         EMERGENCY DEPARTMENT COURSE:    Her pain is better with Dilaudid and Zofran.  She is tolerating oral fluids without difficulty.  She is given cephalexin for UTI.  Urine culture pending.    Pertinent laboratory values include BUN 18, creatinine 1.2, estimated GFR 48, glucose 121, random.  Lipase is normal, remainder of her chemistries are normal.  White blood count is 6500, hemoglobin 11.3, differential normal.  Urinalysis shows large bilirubin trace ketones

## 2024-10-09 NOTE — ED NOTES
Pt presents with c/o left upper abd pain, nausea, and vomiting that started this morning, pt alert and oriented, able to ambulate to room independently without assist, denies any urinary frequency or burning with urination. States that her emesis x 2 today was bile colored.

## 2024-10-09 NOTE — DISCHARGE INSTRUCTIONS
Antibiotic as prescribed.  Norco as needed for pain not controlled with Tylenol.  Continue your Zofran as needed for nausea.  Rest, drink plenty of fluids.  Follow-up with your GI doctor as scheduled.

## 2024-10-09 NOTE — DISCHARGE INSTR - COC
Continuity of Care Form    Patient Name: Rosa Isela Short   :  1950  MRN:  [id number]    Admit date:  10/9/2024  Discharge date:  ***    Code Status Order: Prior   Advance Directives:   Advance Care Flowsheet Documentation             Admitting Physician:  No admitting provider for patient encounter.  PCP: Zora King MD    Discharging Nurse: ***  Discharging Hospital Unit/Room#: E1/E1  Discharging Unit Phone Number: ***    Emergency Contact:   Extended Emergency Contact Information  Primary Emergency Contact: Delfino Short  Address: [address] [place] [id number] [facility]  Home Phone: [phone]  Relation: Spouse  Secondary Emergency Contact: [place] [address] [facility]  Home Phone: [phone]  Relation: Child    Past Surgical History:  Past Surgical History:   Procedure Laterality Date    BOWEL RESECTION  2022    partial bowel obstruction, 1/3 of bowel removed, Dr. Sexton    CARPAL TUNNEL RELEASE Right     CHOLECYSTECTOMY      COLONOSCOPY      ENDOSCOPY, COLON, DIAGNOSTIC      FOOT SURGERY Bilateral 2004    HEMICOLECTOMY N/A 2022    EXPLORATORY LAPAROTOMY WITH BOWEL RESECTION, APPENDECTOMY  performed by Rossy Sexton MD at [facility] OR    HYSTERECTOMY (CERVIX STATUS UNKNOWN)      JOINT REPLACEMENT Right 2009    knee    LITHOTRIPSY      MECKEL DIVERTICULUM EXCISION      diverticulosis    SHOULDER SURGERY Left        Immunization History:   Immunization History   Administered Date(s) Administered    Pneumococcal, PCV-13, PREVNAR 13, (age 6w+), IM, 0.5mL 2019    TDaP, ADACEL (age 10y-64y), BOOSTRIX (age 10y+), IM, 0.5mL 2016       Active Problems:  Patient Active Problem List   Diagnosis Code    Chest pain R07.9    Hypertension I10    Diabetes (HCC) E11.9    Nasal bone fracture S02.2XXA    Nasal trauma S09.92XA    Cellulitis of external nose J34.0    Bursitis of right shoulder M75.51

## 2024-10-10 ASSESSMENT — ENCOUNTER SYMPTOMS
NAUSEA: 1
COUGH: 0
DIARRHEA: 0
VOMITING: 1
BACK PAIN: 1
SHORTNESS OF BREATH: 0
CONSTIPATION: 0
ABDOMINAL PAIN: 1

## 2024-10-11 LAB
BACTERIA UR CULT: ABNORMAL
ORGANISM: ABNORMAL

## 2024-10-22 DIAGNOSIS — E03.9 ACQUIRED HYPOTHYROIDISM: ICD-10-CM

## 2024-10-22 RX ORDER — LEVOTHYROXINE SODIUM 25 UG/1
TABLET ORAL
Qty: 135 TABLET | Refills: 1 | Status: SHIPPED | OUTPATIENT
Start: 2024-10-22

## 2024-10-22 NOTE — PROGRESS NOTES
Medicine)  Zora King MD as PCP - Empaneled Provider  Rafi Landsi MD as Cardiologist (Cardiology)      Reviewed and updated this visit:  Tobacco  Allergies  Meds  Problems  Med Hx  Surg Hx  Soc Hx  Fam Hx

## 2024-10-29 ENCOUNTER — OFFICE VISIT (OUTPATIENT)
Dept: FAMILY MEDICINE CLINIC | Age: 74
End: 2024-10-29

## 2024-10-29 VITALS
RESPIRATION RATE: 16 BRPM | TEMPERATURE: 97.6 F | HEART RATE: 102 BPM | WEIGHT: 111 LBS | HEIGHT: 59 IN | BODY MASS INDEX: 22.38 KG/M2 | DIASTOLIC BLOOD PRESSURE: 64 MMHG | SYSTOLIC BLOOD PRESSURE: 126 MMHG | OXYGEN SATURATION: 100 %

## 2024-10-29 DIAGNOSIS — Z00.00 WELCOME TO MEDICARE PREVENTIVE VISIT: Primary | ICD-10-CM

## 2024-10-29 DIAGNOSIS — I10 PRIMARY HYPERTENSION: ICD-10-CM

## 2024-10-29 DIAGNOSIS — E78.00 PURE HYPERCHOLESTEROLEMIA: ICD-10-CM

## 2024-10-29 DIAGNOSIS — E11.9 TYPE 2 DIABETES MELLITUS WITHOUT COMPLICATION, WITHOUT LONG-TERM CURRENT USE OF INSULIN (HCC): ICD-10-CM

## 2024-10-29 LAB — HBA1C MFR BLD: 5.7 %

## 2024-10-29 RX ORDER — VITAMIN B COMPLEX
1 CAPSULE ORAL DAILY
COMMUNITY

## 2024-10-29 RX ORDER — LANSOPRAZOLE 30 MG/1
CAPSULE, DELAYED RELEASE ORAL
COMMUNITY
Start: 2024-10-22

## 2024-10-29 RX ORDER — LATANOPROST 50 UG/ML
SOLUTION/ DROPS OPHTHALMIC
COMMUNITY
Start: 2024-10-27

## 2024-10-29 RX ORDER — FERROUS SULFATE 325(65) MG
325 TABLET, DELAYED RELEASE (ENTERIC COATED) ORAL
COMMUNITY

## 2024-10-29 ASSESSMENT — PATIENT HEALTH QUESTIONNAIRE - PHQ9
2. FEELING DOWN, DEPRESSED OR HOPELESS: NOT AT ALL
SUM OF ALL RESPONSES TO PHQ QUESTIONS 1-9: 0
SUM OF ALL RESPONSES TO PHQ9 QUESTIONS 1 & 2: 0
SUM OF ALL RESPONSES TO PHQ QUESTIONS 1-9: 0
SUM OF ALL RESPONSES TO PHQ QUESTIONS 1-9: 0
1. LITTLE INTEREST OR PLEASURE IN DOING THINGS: NOT AT ALL
SUM OF ALL RESPONSES TO PHQ QUESTIONS 1-9: 0

## 2024-10-29 ASSESSMENT — LIFESTYLE VARIABLES
HOW MANY STANDARD DRINKS CONTAINING ALCOHOL DO YOU HAVE ON A TYPICAL DAY: 1 OR 2
HOW OFTEN DO YOU HAVE A DRINK CONTAINING ALCOHOL: MONTHLY OR LESS

## 2024-10-30 ENCOUNTER — TELEPHONE (OUTPATIENT)
Dept: FAMILY MEDICINE CLINIC | Age: 74
End: 2024-10-30

## 2024-10-30 DIAGNOSIS — G89.29 CHRONIC NECK PAIN: ICD-10-CM

## 2024-10-30 DIAGNOSIS — R55 VASOVAGAL SYNCOPE: Primary | ICD-10-CM

## 2024-10-30 DIAGNOSIS — M54.2 CHRONIC NECK PAIN: ICD-10-CM

## 2024-10-31 ENCOUNTER — HOSPITAL ENCOUNTER (OUTPATIENT)
Dept: MAMMOGRAPHY | Age: 74
Discharge: HOME | End: 2024-10-31
Attending: FAMILY MEDICINE
Payer: MEDICARE

## 2024-10-31 VITALS — BODY MASS INDEX: 22.38 KG/M2 | WEIGHT: 111 LBS | HEIGHT: 59 IN

## 2024-10-31 DIAGNOSIS — Z12.31 ENCOUNTER FOR SCREENING MAMMOGRAM FOR BREAST CANCER: ICD-10-CM

## 2024-10-31 PROCEDURE — 77063 BREAST TOMOSYNTHESIS BI: CPT

## 2024-11-11 RX ORDER — TOLTERODINE TARTRATE 1 MG/1
1 TABLET, EXTENDED RELEASE ORAL 2 TIMES DAILY
Qty: 60 TABLET | Refills: 0 | Status: SHIPPED | OUTPATIENT
Start: 2024-11-11

## 2024-11-11 NOTE — TELEPHONE ENCOUNTER
Per patient this medication was from kameron carbajal who she saw before switching to dr lynn   States she takes 1 mg daily

## 2024-11-11 NOTE — TELEPHONE ENCOUNTER
Pt call and left VM asking for a refill of the [facility]  Dr. King did not prescribe   Left message for patient to call office back.

## 2024-12-03 ENCOUNTER — LAB (OUTPATIENT)
Dept: LAB | Age: 74
End: 2024-12-03

## 2024-12-03 LAB
ALT SERPL W/O P-5'-P-CCNC: 12 U/L (ref 11–66)
BASOPHILS ABSOLUTE: 0 THOU/MM3 (ref 0–0.1)
BASOPHILS NFR BLD AUTO: 0.2 %
CREAT SERPL-MCNC: 0.7 MG/DL (ref 0.4–1.2)
DEPRECATED RDW RBC AUTO: 47 FL (ref 35–45)
EOSINOPHIL NFR BLD AUTO: 0.9 %
EOSINOPHILS ABSOLUTE: 0 THOU/MM3 (ref 0–0.4)
ERYTHROCYTE [DISTWIDTH] IN BLOOD BY AUTOMATED COUNT: 13.2 % (ref 11.5–14.5)
FOLATE SERPL-MCNC: > 20 NG/ML (ref 4.8–24.2)
GFR SERPL CREATININE-BSD FRML MDRD: > 90 ML/MIN/1.73M2
HCT VFR BLD AUTO: 36 % (ref 37–47)
HGB BLD-MCNC: 11.8 GM/DL (ref 12–16)
IMM GRANULOCYTES # BLD AUTO: 0.01 THOU/MM3 (ref 0–0.07)
IMM GRANULOCYTES NFR BLD AUTO: 0.2 %
IRON SATN MFR SERPL: 21 % (ref 20–50)
IRON SERPL-MCNC: 52 UG/DL (ref 50–170)
LYMPHOCYTES ABSOLUTE: 1.5 THOU/MM3 (ref 1–4.8)
LYMPHOCYTES NFR BLD AUTO: 26.4 %
MCH RBC QN AUTO: 32.4 PG (ref 26–33)
MCHC RBC AUTO-ENTMCNC: 32.8 GM/DL (ref 32.2–35.5)
MCV RBC AUTO: 98.9 FL (ref 81–99)
MONOCYTES ABSOLUTE: 0.3 THOU/MM3 (ref 0.4–1.3)
MONOCYTES NFR BLD AUTO: 5.2 %
NEUTROPHILS ABSOLUTE: 3.7 THOU/MM3 (ref 1.8–7.7)
NEUTROPHILS NFR BLD AUTO: 67.1 %
NRBC BLD AUTO-RTO: 0 /100 WBC
PLATELET # BLD AUTO: 298 THOU/MM3 (ref 130–400)
PMV BLD AUTO: 10.8 FL (ref 9.4–12.4)
RBC # BLD AUTO: 3.64 MILL/MM3 (ref 4.2–5.4)
TIBC SERPL-MCNC: 242 UG/DL (ref 171–450)
URATE SERPL-MCNC: 4.5 MG/DL (ref 2.4–5.7)
VIT B12 SERPL-MCNC: 385 PG/ML (ref 211–911)
WBC # BLD AUTO: 5.5 THOU/MM3 (ref 4.8–10.8)

## 2024-12-04 LAB
ERYTHROCYTE [SEDIMENTATION RATE] IN BLOOD BY WESTERGREN METHOD: 11 MM/HR (ref 0–20)
FERRITIN SERPL IA-MCNC: 172 NG/ML (ref 10–291)

## 2024-12-06 RX ORDER — TOLTERODINE TARTRATE 1 MG/1
1 TABLET, EXTENDED RELEASE ORAL 2 TIMES DAILY
Qty: 60 TABLET | Refills: 5 | Status: SHIPPED | OUTPATIENT
Start: 2024-12-06

## 2024-12-06 NOTE — TELEPHONE ENCOUNTER
Last visit- 10/29/2024  Next visit- 4/29/2025    Requested Prescriptions     Pending Prescriptions Disp Refills    tolterodine (DETROL) 1 MG tablet [Pharmacy Med Name: Tolterodine Tartrate 1 MG Oral Tablet] 60 tablet 0     Sig: Take 1 tablet by mouth twice daily

## 2024-12-15 NOTE — PROGRESS NOTES
Family History   Problem Relation Age of Onset    Arthritis Mother     Cancer Father         Mesothelioma    Heart Disease Father     Arthritis Father     Cancer Sister     Stroke Sister     Breast Cancer Maternal Grandmother 82       Social History:     Social:    Social History     Socioeconomic History    Marital status:      Spouse name: Delfino    Number of children: 3    Years of education: Not on file    Highest education level: Not on file   Occupational History    Not on file   Tobacco Use    Smoking status: Never    Smokeless tobacco: Never   Vaping Use    Vaping status: Never Used   Substance and Sexual Activity    Alcohol use: Yes     Comment: rarely    Drug use: No    Sexual activity: Not on file   Other Topics Concern    Not on file   Social History Narrative    Not on file     Social Determinants of Health     Financial Resource Strain: Low Risk  (4/8/2024)    Overall Financial Resource Strain (CARDIA)     Difficulty of Paying Living Expenses: Not hard at all   Food Insecurity: No Food Insecurity (4/30/2024)    [facility] [employer] [employer]    Hunger Screening     Within the past 12 months we worried whether our food would run out before we got money to buy more.: Never True     Within the past 12 months the food we bought just didn't last and we didn't have money to get more.: Never True   Transportation Needs: Unknown (4/8/2024)    PRAPARE - Transportation     Lack of Transportation (Medical): Not on file     Lack of Transportation (Non-Medical): No   Physical Activity: Inactive (10/29/2024)    Exercise Vital Sign     Days of Exercise per Week: 0 days     Minutes of Exercise per Session: 0 min   Stress: Not on file   Social Connections: Not on file   Intimate Partner Violence: Not on file   Housing Stability: Unknown (4/8/2024)    Housing Stability Vital Sign     Unable to Pay for Housing in the Last Year: Not on file     Number of Places Lived in the Last

## 2024-12-19 ENCOUNTER — OFFICE VISIT (OUTPATIENT)
Dept: FAMILY MEDICINE CLINIC | Age: 74
End: 2024-12-19
Payer: MEDICARE

## 2024-12-19 VITALS
HEART RATE: 94 BPM | OXYGEN SATURATION: 99 % | SYSTOLIC BLOOD PRESSURE: 110 MMHG | WEIGHT: 108.8 LBS | BODY MASS INDEX: 21.93 KG/M2 | RESPIRATION RATE: 16 BRPM | TEMPERATURE: 98.7 F | HEIGHT: 59 IN | DIASTOLIC BLOOD PRESSURE: 62 MMHG

## 2024-12-19 DIAGNOSIS — Z01.818 PREOPERATIVE CLEARANCE: Primary | ICD-10-CM

## 2024-12-19 DIAGNOSIS — G89.29 CHRONIC LEFT SHOULDER PAIN: ICD-10-CM

## 2024-12-19 DIAGNOSIS — M25.512 CHRONIC LEFT SHOULDER PAIN: ICD-10-CM

## 2024-12-19 PROCEDURE — 1159F MED LIST DOCD IN RCRD: CPT | Performed by: FAMILY MEDICINE

## 2024-12-19 PROCEDURE — 3078F DIAST BP <80 MM HG: CPT | Performed by: FAMILY MEDICINE

## 2024-12-19 PROCEDURE — 1160F RVW MEDS BY RX/DR IN RCRD: CPT | Performed by: FAMILY MEDICINE

## 2024-12-19 PROCEDURE — 1123F ACP DISCUSS/DSCN MKR DOCD: CPT | Performed by: FAMILY MEDICINE

## 2024-12-19 PROCEDURE — 3074F SYST BP LT 130 MM HG: CPT | Performed by: FAMILY MEDICINE

## 2024-12-19 PROCEDURE — 99214 OFFICE O/P EST MOD 30 MIN: CPT | Performed by: FAMILY MEDICINE

## 2024-12-31 LAB
ALBUMIN: 4.3 G/DL
ALP BLD-CCNC: 57 U/L
ALT SERPL-CCNC: 16 U/L
ANION GAP SERPL CALCULATED.3IONS-SCNC: 10 MMOL/L
AST SERPL-CCNC: 25 U/L
BASOPHILS ABSOLUTE: 0 /ΜL
BASOPHILS RELATIVE PERCENT: 0.1 %
BILIRUB SERPL-MCNC: 0.7 MG/DL (ref 0.1–1.4)
BUN BLDV-MCNC: 16 MG/DL
CALCIUM SERPL-MCNC: 10 MG/DL
CHLORIDE BLD-SCNC: 100 MMOL/L
CO2: 25 MMOL/L
CREAT SERPL-MCNC: 0.81 MG/DL
EOSINOPHILS ABSOLUTE: 0.1 /ΜL
EOSINOPHILS RELATIVE PERCENT: 1.9 %
GFR, ESTIMATED: >60
GLUCOSE BLD-MCNC: 113 MG/DL
HCT VFR BLD CALC: 36.1 % (ref 36–46)
HEMOGLOBIN: 12.1 G/DL (ref 12–16)
LYMPHOCYTES ABSOLUTE: 1.4 /ΜL
LYMPHOCYTES RELATIVE PERCENT: 34.5 %
MCH RBC QN AUTO: 33.3 PG
MCHC RBC AUTO-ENTMCNC: 33.5 G/DL
MCV RBC AUTO: 99.5 FL
MONOCYTES ABSOLUTE: 0.3 /ΜL
MONOCYTES RELATIVE PERCENT: 7.2 %
NEUTROPHILS ABSOLUTE: 2.3 /ΜL
NEUTROPHILS RELATIVE PERCENT: 56.3 %
PLATELET # BLD: 265 K/ΜL
PMV BLD AUTO: 8.7 FL
POTASSIUM SERPL-SCNC: 4 MMOL/L
RBC # BLD: 3.62 10^6/ΜL
SODIUM BLD-SCNC: 135 MMOL/L
TOTAL PROTEIN: 7 G/DL (ref 6.4–8.2)
WBC # BLD: 4.1 10^3/ML

## 2025-01-06 ENCOUNTER — TELEPHONE (OUTPATIENT)
Dept: FAMILY MEDICINE CLINIC | Age: 75
End: 2025-01-06

## 2025-01-06 NOTE — TELEPHONE ENCOUNTER
Alma with Dr. Ramirez's office called and left  asking for pre-op clearance, pt scheduled for surgery 1/9   Left [facility] with Alma to get fax number to send clearance to

## 2025-01-31 ENCOUNTER — HOSPITAL ENCOUNTER (OUTPATIENT)
Dept: OCCUPATIONAL THERAPY | Age: 75
Setting detail: THERAPIES SERIES
Discharge: HOME | End: 2025-01-31
Payer: MEDICARE

## 2025-01-31 PROCEDURE — 97165 OT EVAL LOW COMPLEX 30 MIN: CPT

## 2025-01-31 NOTE — PROGRESS NOTES
[facility]  OCCUPATIONAL THERAPY  [x] EVALUATION  [] DAILY NOTE (LAND) [] DAILY NOTE (AQUATIC ) [] PROGRESS NOTE [] DISCHARGE NOTE    [] OUTPATIENT REHABILITATION CENTER [facility]   [] [place] [facility]    [x] [facility]   [] [facility]    Date: 2025  Patient Name:  Rosa Isela Short  : 1950  MRN: [id number]  CSN: [id number]    Referring Practitioner Lucy Delgadillo, APRN - CNP [id number]      Diagnosis  Diagnoses       M25.512 (ICD-10-CM) - Pain in left shoulder    Z96.612 (ICD-10-CM) - Presence of left artificial shoulder joint           Treatment Diagnosis M25.512  Left Shoulder Pain  M79.602  Left Arm Pain  M25.612 Stiffness of Left Shoulder   Date of Evaluation 25   Additional Pertinent History Rosa Isela Short has a past medical history of Anxiety, Blood transfusion, Diverticulosis of colon, Hyperlipidemia, Hypertension, Kidney stones, Localized, primary osteoarthritis of shoulder region, Obstruction of ascending colon (HCC), and T2DM (type 2 diabetes mellitus) ([facility]).  she has a past surgical history that includes joint replacement (Right, 2009); Cholecystectomy; Foot surgery (Bilateral, ); shoulder surgery (Left, ); Lithotripsy; Colonoscopy; Endoscopy, colon, diagnostic; Meckel's diverticulum excision; Carpal tunnel release (Right); Hysterectomy (); hemicolectomy (N/A, 2022); and Bowel resection (2022).     Allergies Allergies   Allergen Reactions    Adhesive Tape       Medications   Current Outpatient Medications:     tolterodine (DETROL) 1 MG tablet, Take 1 tablet by mouth twice daily, Disp: 60 tablet, Rfl: 5    Handicap Placard MISC, by Does not apply route Expires in 5 years. Dx:, Disp: 1 each, Rfl: 0    lansoprazole (PREVACID) 30 MG delayed release capsule, TAKE 1 CAPSULE BY MOUTH 30 MINUTES BEFORE BREAKFAST ONCE DAILY, Disp: , Rfl:     latanoprost (XALATAN) 0.005 % ophthalmic solution, , Disp: ,

## 2025-02-05 ENCOUNTER — HOSPITAL ENCOUNTER (OUTPATIENT)
Dept: OCCUPATIONAL THERAPY | Age: 75
Setting detail: THERAPIES SERIES
Discharge: HOME | End: 2025-02-05
Payer: MEDICARE

## 2025-02-05 PROCEDURE — 97110 THERAPEUTIC EXERCISES: CPT

## 2025-02-05 RX ORDER — METFORMIN HYDROCHLORIDE 500 MG/1
1000 TABLET, EXTENDED RELEASE ORAL 2 TIMES DAILY
Qty: 90 TABLET | Refills: 1 | Status: SHIPPED | OUTPATIENT
Start: 2025-02-05

## 2025-02-05 NOTE — TELEPHONE ENCOUNTER
Last visit- 12/19/2024  Next visit- 4/29/2025    Requested Prescriptions     Pending Prescriptions Disp Refills    metFORMIN (GLUCOPHAGE-XR) 500 MG extended release tablet 90 tablet 1     Sig: Take 2 tablets by mouth 2 times daily

## 2025-02-05 NOTE — PROGRESS NOTES
strengthening, pain management    Activity/Treatment Tolerance:  [x]  Patient tolerated treatment well  []  Patient limited by fatigue  []  Patient limited by pain   []  Patient limited by other medical complications  []  Other:     Assessment: Patient is progressing toward goals. Extensive education provided to patient to not be using left UE in daily activities to limit ROM at current time.     GOALS:  Patient Goal: To get better ROM with less pain.     Short Term Goals:  Time Frame: 4 weels  Be independent with HEP as instructed to increase her ability to reach overhead.  Increase active left shoulder flexion to 130, abduction to 115, and ER at side to 50 to increase her ability to complete hair care.  Increase passive left shoulder flexion to 170, abduction to 130, and ER at 90 to 40 to increase her flexibility to allow patient to eventually use left arm to retrieve item from upper shelf.  Report pain going no higher than 2/10 at any time in left UE    Long Term Goals:  Time Frame: 12 weeks  Be able to use her left arm to retrieve item from drive thru or LU with no difficulty.  Be able to place lightweight item onto upper shelf using left UE without difficulty or pain.  Be able to turn steering wheel with left UE without any difficulty.    Patient Education:   [x]  HEP/Education Completed: Plan of Care, Goals, HEP - scapular retraction, backward shoulder circles, supine dowel for shoulder flexion and chest press, table slides for shoulder flexion and abduction  2/5/25: instructed patient to limit AROM of left shoulder per physician protocol; reviewed physician protocol with patient   []  No new Education completed  []  Reviewed Prior HEP      [x]  Patient verbalized and/or demonstrated understanding of education provided.  []  Patient unable to verbalize and/or demonstrate understanding of education provided.  Will continue education.  [x]  Barriers to learning: none    PLAN:    []  Plan of care initiated.

## 2025-02-07 ENCOUNTER — HOSPITAL ENCOUNTER (OUTPATIENT)
Dept: OCCUPATIONAL THERAPY | Age: 75
Setting detail: THERAPIES SERIES
Discharge: HOME | End: 2025-02-07
Payer: MEDICARE

## 2025-02-07 PROCEDURE — 97110 THERAPEUTIC EXERCISES: CPT

## 2025-02-07 NOTE — PROGRESS NOTES
[facility]  OCCUPATIONAL THERAPY  [] EVALUATION  [x] DAILY NOTE (LAND) [] DAILY NOTE (AQUATIC ) [] PROGRESS NOTE [] DISCHARGE NOTE    [] OUTPATIENT REHABILITATION CENTER [facility]   [] [place] [facility]    [x] [facility]   [] [facility]    Date: 2025  Patient Name:  Rosa Isela Short  : 1950  MRN: [id number]  CSN: [id number]    Referring Practitioner Lucy Delgadillo, APRN - CNP [id number]      Diagnosis  Diagnoses       M25.512 (ICD-10-CM) - Pain in left shoulder    Z96.612 (ICD-10-CM) - Presence of left artificial shoulder joint           Treatment Diagnosis M25.512  Left Shoulder Pain  M79.602  Left Arm Pain  M25.612 Stiffness of Left Shoulder   Date of Evaluation 25   Additional Pertinent History Rosa Isela Short has a past medical history of Anxiety, Blood transfusion, Diverticulosis of colon, Hyperlipidemia, Hypertension, Kidney stones, Localized, primary osteoarthritis of shoulder region, Obstruction of ascending colon (HCC), and T2DM (type 2 diabetes mellitus) ([facility]).  she has a past surgical history that includes joint replacement (Right, 2009); Cholecystectomy; Foot surgery (Bilateral, ); shoulder surgery (Left, ); Lithotripsy; Colonoscopy; Endoscopy, colon, diagnostic; Meckel's diverticulum excision; Carpal tunnel release (Right); Hysterectomy (); hemicolectomy (N/A, 2022); and Bowel resection (2022).     Allergies Allergies   Allergen Reactions    Adhesive Tape       Medications   Current Outpatient Medications:     metFORMIN (GLUCOPHAGE-XR) 500 MG extended release tablet, Take 2 tablets by mouth 2 times daily, Disp: 90 tablet, Rfl: 1    tolterodine (DETROL) 1 MG tablet, Take 1 tablet by mouth twice daily, Disp: 60 tablet, Rfl: 5    Handicap Placard MISC, by Does not apply route Expires in 5 years. Dx:, Disp: 1 each, Rfl: 0    lansoprazole (PREVACID) 30 MG delayed release capsule, TAKE 1 CAPSULE BY

## 2025-02-12 ENCOUNTER — HOSPITAL ENCOUNTER (OUTPATIENT)
Dept: OCCUPATIONAL THERAPY | Age: 75
Setting detail: THERAPIES SERIES
Discharge: HOME | End: 2025-02-12
Payer: MEDICARE

## 2025-02-12 PROCEDURE — 97110 THERAPEUTIC EXERCISES: CPT

## 2025-02-12 NOTE — PROGRESS NOTES
[facility]  OCCUPATIONAL THERAPY  [] EVALUATION  [x] DAILY NOTE (LAND) [] DAILY NOTE (AQUATIC ) [] PROGRESS NOTE [] DISCHARGE NOTE    [] OUTPATIENT REHABILITATION CENTER [facility]   [] [place] [facility]    [x] [facility]   [] [facility]    Date: 2025  Patient Name:  Rosa Isela Short  : 1950  MRN: [id number]  CSN: [id number]    Referring Practitioner Lucy Delgadillo, APRN - CNP [phone]      Diagnosis  Diagnoses       M25.512 (ICD-10-CM) - Pain in left shoulder    Z96.612 (ICD-10-CM) - Presence of left artificial shoulder joint           Treatment Diagnosis M25.512  Left Shoulder Pain  M79.602  Left Arm Pain  M25.612 Stiffness of Left Shoulder   Date of Evaluation 25   Additional Pertinent History Rosa Isela Short has a past medical history of Anxiety, Blood transfusion, Diverticulosis of colon, Hyperlipidemia, Hypertension, Kidney stones, Localized, primary osteoarthritis of shoulder region, Obstruction of ascending colon (HCC), and T2DM (type 2 diabetes mellitus) ([facility]).  she has a past surgical history that includes joint replacement (Right, 2009); Cholecystectomy; Foot surgery (Bilateral, ); shoulder surgery (Left, ); Lithotripsy; Colonoscopy; Endoscopy, colon, diagnostic; Meckel's diverticulum excision; Carpal tunnel release (Right); Hysterectomy (); hemicolectomy (N/A, 2022); and Bowel resection (2022).     Allergies Allergies   Allergen Reactions    Adhesive Tape       Medications   Current Outpatient Medications:     metFORMIN (GLUCOPHAGE-XR) 500 MG extended release tablet, Take 2 tablets by mouth 2 times daily, Disp: 90 tablet, Rfl: 1    tolterodine (DETROL) 1 MG tablet, Take 1 tablet by mouth twice daily, Disp: 60 tablet, Rfl: 5    Handicap Placard MISC, by Does not apply route Expires in 5 years. Dx:, Disp: 1 each, Rfl: 0    lansoprazole (PREVACID) 30 MG delayed release capsule, TAKE 1 CAPSULE BY

## 2025-02-14 ENCOUNTER — HOSPITAL ENCOUNTER (OUTPATIENT)
Dept: OCCUPATIONAL THERAPY | Age: 75
Setting detail: THERAPIES SERIES
Discharge: HOME | End: 2025-02-14
Payer: MEDICARE

## 2025-02-14 PROCEDURE — 97110 THERAPEUTIC EXERCISES: CPT

## 2025-02-14 NOTE — PROGRESS NOTES
[facility]  OCCUPATIONAL THERAPY  [] EVALUATION  [x] DAILY NOTE (LAND) [] DAILY NOTE (AQUATIC ) [] PROGRESS NOTE [] DISCHARGE NOTE    [] OUTPATIENT REHABILITATION CENTER [facility]   [] [place] [facility]    [x] [facility]   [] [facility]    Date: 2025  Patient Name:  Rosa Isela Short  : 1950  MRN: [id number]  CSN: [id number]    Referring Practitioner Lucy Delgadillo, APRN - CNP [id number]      Diagnosis  Diagnoses       M25.512 (ICD-10-CM) - Pain in left shoulder    Z96.612 (ICD-10-CM) - Presence of left artificial shoulder joint           Treatment Diagnosis M25.512  Left Shoulder Pain  M79.602  Left Arm Pain  M25.612 Stiffness of Left Shoulder   Date of Evaluation 25   Additional Pertinent History Rosa Isela Short has a past medical history of Anxiety, Blood transfusion, Diverticulosis of colon, Hyperlipidemia, Hypertension, Kidney stones, Localized, primary osteoarthritis of shoulder region, Obstruction of ascending colon (HCC), and T2DM (type 2 diabetes mellitus) ([facility]).  she has a past surgical history that includes joint replacement (Right, 2009); Cholecystectomy; Foot surgery (Bilateral, ); shoulder surgery (Left, ); Lithotripsy; Colonoscopy; Endoscopy, colon, diagnostic; Meckel's diverticulum excision; Carpal tunnel release (Right); Hysterectomy (); hemicolectomy (N/A, 2022); and Bowel resection (2022).     Allergies Allergies   Allergen Reactions    Adhesive Tape       Medications   Current Outpatient Medications:     metFORMIN (GLUCOPHAGE-XR) 500 MG extended release tablet, Take 2 tablets by mouth 2 times daily, Disp: 90 tablet, Rfl: 1    tolterodine (DETROL) 1 MG tablet, Take 1 tablet by mouth twice daily, Disp: 60 tablet, Rfl: 5    Handicap Placard MISC, by Does not apply route Expires in 5 years. Dx:, Disp: 1 each, Rfl: 0    lansoprazole (PREVACID) 30 MG delayed release capsule, TAKE 1 CAPSULE BY

## 2025-02-17 ENCOUNTER — HOSPITAL ENCOUNTER (OUTPATIENT)
Dept: OCCUPATIONAL THERAPY | Age: 75
Setting detail: THERAPIES SERIES
Discharge: HOME | End: 2025-02-17
Payer: MEDICARE

## 2025-02-17 PROCEDURE — 97110 THERAPEUTIC EXERCISES: CPT

## 2025-02-17 NOTE — PROGRESS NOTES
[facility]  OCCUPATIONAL THERAPY  [] EVALUATION  [x] DAILY NOTE (LAND) [] DAILY NOTE (AQUATIC ) [] PROGRESS NOTE [] DISCHARGE NOTE    [] OUTPATIENT REHABILITATION CENTER [facility]   [] [place] [facility]    [x] [facility]   [] [facility]    Date: 2025  Patient Name:  Rosa Isela Short  : 1950  MRN: [id number]  CSN: [id number]    Referring Practitioner Lucy Delgadillo, APRN - CNP [id number]      Diagnosis  Diagnoses       M25.512 (ICD-10-CM) - Pain in left shoulder    Z96.612 (ICD-10-CM) - Presence of left artificial shoulder joint           Treatment Diagnosis M25.512  Left Shoulder Pain  M79.602  Left Arm Pain  M25.612 Stiffness of Left Shoulder   Date of Evaluation 25   Additional Pertinent History Rosa Isela Short has a past medical history of Anxiety, Blood transfusion, Diverticulosis of colon, Hyperlipidemia, Hypertension, Kidney stones, Localized, primary osteoarthritis of shoulder region, Obstruction of ascending colon (HCC), and T2DM (type 2 diabetes mellitus) ([facility]ly [facility]).  she has a past surgical history that includes joint replacement (Right, 2009); Cholecystectomy; Foot surgery (Bilateral, ); shoulder surgery (Left, ); Lithotripsy; Colonoscopy; Endoscopy, colon, diagnostic; Meckel's diverticulum excision; Carpal tunnel release (Right); Hysterectomy (); hemicolectomy (N/A, 2022); and Bowel resection (2022).     Allergies Allergies   Allergen Reactions    Adhesive Tape       Medications   Current Outpatient Medications:     metFORMIN (GLUCOPHAGE-XR) 500 MG extended release tablet, Take 2 tablets by mouth 2 times daily, Disp: 90 tablet, Rfl: 1    tolterodine (DETROL) 1 MG tablet, Take 1 tablet by mouth twice daily, Disp: 60 tablet, Rfl: 5    Handicap Placard MISC, by Does not apply route Expires in 5 years. Dx:, Disp: 1 each, Rfl: 0    lansoprazole (PREVACID) 30 MG delayed release capsule, TAKE 1 CAPSULE BY

## 2025-02-21 ENCOUNTER — HOSPITAL ENCOUNTER (OUTPATIENT)
Dept: OCCUPATIONAL THERAPY | Age: 75
Setting detail: THERAPIES SERIES
Discharge: HOME | End: 2025-02-21
Payer: MEDICARE

## 2025-02-21 PROCEDURE — 97110 THERAPEUTIC EXERCISES: CPT

## 2025-02-21 NOTE — PROGRESS NOTES
[facility]  OCCUPATIONAL THERAPY  [] EVALUATION  [x] DAILY NOTE (LAND) [] DAILY NOTE (AQUATIC ) [] PROGRESS NOTE [] DISCHARGE NOTE    [] OUTPATIENT REHABILITATION CENTER [facility]   [] [place] [facility]    [x] [facility]   [] [facility]    Date: 2025  Patient Name:  Rosa Isela Short  : 1950  MRN: [id number]  CSN: [id number]    Referring Practitioner Lucy Delgadillo, APRN - CNP [id number]      Diagnosis  Diagnoses       M25.512 (ICD-10-CM) - Pain in left shoulder    Z96.612 (ICD-10-CM) - Presence of left artificial shoulder joint           Treatment Diagnosis M25.512  Left Shoulder Pain  M79.602  Left Arm Pain  M25.612 Stiffness of Left Shoulder   Date of Evaluation 25   Additional Pertinent History Rosa Isela Short has a past medical history of Anxiety, Blood transfusion, Diverticulosis of colon, Hyperlipidemia, Hypertension, Kidney stones, Localized, primary osteoarthritis of shoulder region, Obstruction of ascending colon (HCC), and T2DM (type 2 diabetes mellitus) ([facility]).  she has a past surgical history that includes joint replacement (Right, 2009); Cholecystectomy; Foot surgery (Bilateral, ); shoulder surgery (Left, ); Lithotripsy; Colonoscopy; Endoscopy, colon, diagnostic; Meckel's diverticulum excision; Carpal tunnel release (Right); Hysterectomy (); hemicolectomy (N/A, 2022); and Bowel resection (2022).     Allergies Allergies   Allergen Reactions    Adhesive Tape       Medications   Current Outpatient Medications:     metFORMIN (GLUCOPHAGE-XR) 500 MG extended release tablet, Take 2 tablets by mouth 2 times daily, Disp: 90 tablet, Rfl: 1    tolterodine (DETROL) 1 MG tablet, Take 1 tablet by mouth twice daily, Disp: 60 tablet, Rfl: 5    Handicap Placard MISC, by Does not apply route Expires in 5 years. Dx:, Disp: 1 each, Rfl: 0    lansoprazole (PREVACID) 30 MG delayed release capsule, TAKE 1 CAPSULE BY

## 2025-02-24 ENCOUNTER — HOSPITAL ENCOUNTER (OUTPATIENT)
Dept: OCCUPATIONAL THERAPY | Age: 75
Setting detail: THERAPIES SERIES
Discharge: HOME | End: 2025-02-24
Payer: MEDICARE

## 2025-02-24 ENCOUNTER — LAB (OUTPATIENT)
Dept: LAB | Age: 75
End: 2025-02-24

## 2025-02-24 LAB
BASOPHILS ABSOLUTE: 0 THOU/MM3 (ref 0–0.1)
BASOPHILS NFR BLD AUTO: 0.2 %
DEPRECATED RDW RBC AUTO: 49.7 FL (ref 35–45)
EOSINOPHIL NFR BLD AUTO: 0.8 %
EOSINOPHILS ABSOLUTE: 0 THOU/MM3 (ref 0–0.4)
ERYTHROCYTE [DISTWIDTH] IN BLOOD BY AUTOMATED COUNT: 13.5 % (ref 11.5–14.5)
HCT VFR BLD AUTO: 36.4 % (ref 37–47)
HGB BLD-MCNC: 11.6 GM/DL (ref 12–16)
IMM GRANULOCYTES # BLD AUTO: 0.01 THOU/MM3 (ref 0–0.07)
IMM GRANULOCYTES NFR BLD AUTO: 0.2 %
LYMPHOCYTES ABSOLUTE: 1.4 THOU/MM3 (ref 1–4.8)
LYMPHOCYTES NFR BLD AUTO: 28.2 %
MCH RBC QN AUTO: 31.7 PG (ref 26–33)
MCHC RBC AUTO-ENTMCNC: 31.9 GM/DL (ref 32.2–35.5)
MCV RBC AUTO: 99.5 FL (ref 81–99)
MONOCYTES ABSOLUTE: 0.3 THOU/MM3 (ref 0.4–1.3)
MONOCYTES NFR BLD AUTO: 6.7 %
NEUTROPHILS ABSOLUTE: 3.2 THOU/MM3 (ref 1.8–7.7)
NEUTROPHILS NFR BLD AUTO: 63.9 %
NRBC BLD AUTO-RTO: 0 /100 WBC
PLATELET # BLD AUTO: 292 THOU/MM3 (ref 130–400)
PMV BLD AUTO: 11 FL (ref 9.4–12.4)
RBC # BLD AUTO: 3.66 MILL/MM3 (ref 4.2–5.4)
WBC # BLD AUTO: 5 THOU/MM3 (ref 4.8–10.8)

## 2025-02-24 PROCEDURE — 97110 THERAPEUTIC EXERCISES: CPT

## 2025-02-24 SDOH — ECONOMIC STABILITY: FOOD INSECURITY: WITHIN THE PAST 12 MONTHS, THE FOOD YOU BOUGHT JUST DIDN'T LAST AND YOU DIDN'T HAVE MONEY TO GET MORE.: SOMETIMES TRUE

## 2025-02-24 SDOH — ECONOMIC STABILITY: INCOME INSECURITY: IN THE LAST 12 MONTHS, WAS THERE A TIME WHEN YOU WERE NOT ABLE TO PAY THE MORTGAGE OR RENT ON TIME?: NO

## 2025-02-24 SDOH — ECONOMIC STABILITY: FOOD INSECURITY: WITHIN THE PAST 12 MONTHS, YOU WORRIED THAT YOUR FOOD WOULD RUN OUT BEFORE YOU GOT MONEY TO BUY MORE.: SOMETIMES TRUE

## 2025-02-24 SDOH — ECONOMIC STABILITY: TRANSPORTATION INSECURITY
IN THE PAST 12 MONTHS, HAS LACK OF TRANSPORTATION KEPT YOU FROM MEETINGS, WORK, OR FROM GETTING THINGS NEEDED FOR DAILY LIVING?: NO

## 2025-02-24 SDOH — ECONOMIC STABILITY: TRANSPORTATION INSECURITY
IN THE PAST 12 MONTHS, HAS THE LACK OF TRANSPORTATION KEPT YOU FROM MEDICAL APPOINTMENTS OR FROM GETTING MEDICATIONS?: NO

## 2025-02-24 ASSESSMENT — PATIENT HEALTH QUESTIONNAIRE - PHQ9
2. FEELING DOWN, DEPRESSED OR HOPELESS: NOT AT ALL
2. FEELING DOWN, DEPRESSED OR HOPELESS: NOT AT ALL
SUM OF ALL RESPONSES TO PHQ QUESTIONS 1-9: 0
1. LITTLE INTEREST OR PLEASURE IN DOING THINGS: NOT AT ALL
SUM OF ALL RESPONSES TO PHQ9 QUESTIONS 1 & 2: 0
SUM OF ALL RESPONSES TO PHQ9 QUESTIONS 1 & 2: 0
SUM OF ALL RESPONSES TO PHQ QUESTIONS 1-9: 0
1. LITTLE INTEREST OR PLEASURE IN DOING THINGS: NOT AT ALL

## 2025-02-24 NOTE — PROGRESS NOTES
[facility]  OCCUPATIONAL THERAPY  [] EVALUATION  [x] DAILY NOTE (LAND) [] DAILY NOTE (AQUATIC ) [] PROGRESS NOTE [] DISCHARGE NOTE    [] OUTPATIENT REHABILITATION CENTER [facility]   [] [place] [facility]    [x] [facility]   [] [facility]    Date: 2025  Patient Name:  Rosa Isela Short  : 1950  MRN: [id number]  CSN: [id number]    Referring Practitioner Lucy Delgadillo, APRN - CNP [id number]      Diagnosis  Diagnoses       M25.512 (ICD-10-CM) - Pain in left shoulder    Z96.612 (ICD-10-CM) - Presence of left artificial shoulder joint           Treatment Diagnosis M25.512  Left Shoulder Pain  M79.602  Left Arm Pain  M25.612 Stiffness of Left Shoulder   Date of Evaluation 25   Additional Pertinent History Rosa Isela Short has a past medical history of Anxiety, Blood transfusion, Diverticulosis of colon, Hyperlipidemia, Hypertension, Kidney stones, Localized, primary osteoarthritis of shoulder region, Obstruction of ascending colon (HCC), and T2DM (type 2 diabetes mellitus) ([facility]).  she has a past surgical history that includes joint replacement (Right, 2009); Cholecystectomy; Foot surgery (Bilateral, ); shoulder surgery (Left, ); Lithotripsy; Colonoscopy; Endoscopy, colon, diagnostic; Meckel's diverticulum excision; Carpal tunnel release (Right); Hysterectomy (); hemicolectomy (N/A, 2022); and Bowel resection (2022).     Allergies Allergies   Allergen Reactions    Adhesive Tape       Medications   Current Outpatient Medications:     metFORMIN (GLUCOPHAGE-XR) 500 MG extended release tablet, Take 2 tablets by mouth 2 times daily, Disp: 90 tablet, Rfl: 1    tolterodine (DETROL) 1 MG tablet, Take 1 tablet by mouth twice daily, Disp: 60 tablet, Rfl: 5    Handicap Placard MISC, by Does not apply route Expires in 5 years. Dx:, Disp: 1 each, Rfl: 0    lansoprazole (PREVACID) 30 MG delayed release capsule, TAKE 1 CAPSULE BY

## 2025-02-25 LAB
ALT SERPL W/O P-5'-P-CCNC: 14 U/L (ref 11–66)
CREAT SERPL-MCNC: 0.6 MG/DL (ref 0.4–1.2)
ERYTHROCYTE [SEDIMENTATION RATE] IN BLOOD BY WESTERGREN METHOD: 10 MM/HR (ref 0–20)
GFR SERPL CREATININE-BSD FRML MDRD: > 90 ML/MIN/1.73M2
URATE SERPL-MCNC: 4 MG/DL (ref 2.4–5.7)

## 2025-02-25 NOTE — PROGRESS NOTES
[facility] Medicine  [address]  [place] [address]  Phone:  [phone]          Name: Rosa Isela Short  : 1950    Chief Complaint   Patient presents with    Hip Pain       HPI:     Rosa Isela Short is a 74 y.o. female who presents today for evaluation of right hip pain after a fall.  She's had chronic pain in her right hip for years but on 25 she slipped and fell on ice and landed on her right side.  She was able to get up on her own.  She did not strike her head or have LOC.  She's had pain to the right lateral hip that's present both sitting and walking.  The pain does not radiate.  She has to shift frequently as it's uncomfortable.  She takes Percocet for her shoulder occasionally.      Current Outpatient Medications:     oxyCODONE-acetaminophen (PERCOCET) 5-325 MG per tablet, TAKE 1 TABLET BY MOUTH EVERY 6 HOURS AS NEEDED FOR PAIN FOR 7 DAYS, Disp: , Rfl:     metFORMIN (GLUCOPHAGE-XR) 500 MG extended release tablet, Take 2 tablets by mouth 2 times daily, Disp: 90 tablet, Rfl: 1    tolterodine (DETROL) 1 MG tablet, Take 1 tablet by mouth twice daily, Disp: 60 tablet, Rfl: 5    Handicap Placard MISC, by Does not apply route Expires in 5 years. Dx:, Disp: 1 each, Rfl: 0    lansoprazole (PREVACID) 30 MG delayed release capsule, TAKE 1 CAPSULE BY MOUTH 30 MINUTES BEFORE BREAKFAST ONCE DAILY, Disp: , Rfl:     latanoprost (XALATAN) 0.005 % ophthalmic solution, , Disp: , Rfl:     b complex vitamins capsule, Take 1 capsule by mouth daily, Disp: , Rfl:     ferrous sulfate (FE TABS 325) 325 (65 Fe) MG EC tablet, Take 1 tablet by mouth 3 times daily (with meals), Disp: , Rfl:     levothyroxine (SYNTHROID) 25 MCG tablet, TAKE 2 TABLETS ON MONDAY, WEDNESDAY, AND FRIDAY AND 1 TABLET ALL OTHER DAYS, Disp: 135 tablet, Rfl: 1    aspirin 81 MG EC tablet, Take 1 tablet by mouth daily, Disp: , Rfl:     ondansetron (ZOFRAN) 4 MG tablet, Take 1 tablet by mouth every 8 hours as needed for Nausea

## 2025-02-26 ENCOUNTER — HOSPITAL ENCOUNTER (OUTPATIENT)
Age: 75
Discharge: HOME | End: 2025-02-26
Payer: MEDICARE

## 2025-02-26 ENCOUNTER — HOSPITAL ENCOUNTER (OUTPATIENT)
Dept: GENERAL RADIOLOGY | Age: 75
Discharge: HOME | End: 2025-02-26
Attending: FAMILY MEDICINE
Payer: MEDICARE

## 2025-02-26 ENCOUNTER — OFFICE VISIT (OUTPATIENT)
Dept: FAMILY MEDICINE CLINIC | Age: 75
End: 2025-02-26
Payer: MEDICARE

## 2025-02-26 VITALS
WEIGHT: 107 LBS | HEIGHT: 59 IN | DIASTOLIC BLOOD PRESSURE: 74 MMHG | SYSTOLIC BLOOD PRESSURE: 104 MMHG | OXYGEN SATURATION: 100 % | RESPIRATION RATE: 16 BRPM | HEART RATE: 109 BPM | BODY MASS INDEX: 21.57 KG/M2 | TEMPERATURE: 97.2 F

## 2025-02-26 DIAGNOSIS — M25.551 RIGHT HIP PAIN: ICD-10-CM

## 2025-02-26 DIAGNOSIS — M25.551 RIGHT HIP PAIN: Primary | ICD-10-CM
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
## 2025-02-26 PROCEDURE — 1159F MED LIST DOCD IN RCRD: CPT | Performed by: FAMILY MEDICINE

## 2025-02-26 PROCEDURE — 3074F SYST BP LT 130 MM HG: CPT | Performed by: FAMILY MEDICINE

## 2025-02-26 PROCEDURE — 1123F ACP DISCUSS/DSCN MKR DOCD: CPT | Performed by: FAMILY MEDICINE

## 2025-02-26 PROCEDURE — 99213 OFFICE O/P EST LOW 20 MIN: CPT | Performed by: FAMILY MEDICINE

## 2025-02-26 PROCEDURE — 73502 X-RAY EXAM HIP UNI 2-3 VIEWS: CPT

## 2025-02-26 PROCEDURE — 3078F DIAST BP <80 MM HG: CPT | Performed by: FAMILY MEDICINE

## 2025-02-26 RX ORDER — MELOXICAM 15 MG/1
15 TABLET ORAL DAILY
COMMUNITY
Start: 2025-01-10 | End: 2025-02-26

## 2025-02-26 RX ORDER — OXYCODONE AND ACETAMINOPHEN 5; 325 MG/1; MG/1
TABLET ORAL
COMMUNITY
Start: 2025-01-09

## 2025-02-28 ENCOUNTER — HOSPITAL ENCOUNTER (OUTPATIENT)
Dept: OCCUPATIONAL THERAPY | Age: 75
Setting detail: THERAPIES SERIES
Discharge: HOME | End: 2025-02-28
Payer: MEDICARE

## 2025-02-28 PROCEDURE — 97110 THERAPEUTIC EXERCISES: CPT

## 2025-02-28 NOTE — PROGRESS NOTES
for shoulder abduction 1 20 x    Supine PROM to left shoulder per protocol parameters   x    Supine AROM with left UE - shoulder flexion, horizontal abduction/adduction, ceiling punches, ceiling circles 1 15 each     Seated saeboglide for left shoulder flexion and scaption 1 15 each     Biodex at 100 speed x 6 minutes forward   x    Pulleys for shoulder flexion 1 20 x    Activities Time    Notes/Comments                       Specific Interventions Next Treatment: ROM, eventual strengthening, pain management    Activity/Treatment Tolerance:  [x]  Patient tolerated treatment well  []  Patient limited by fatigue  []  Patient limited by pain   []  Patient limited by other medical complications  []  Other:     Assessment:  Patient is making nice progress toward goals. Patient was able to tolerate slightly increased resistance with UE biodex this date.     GOALS:  Patient Goal: To get better ROM with less pain.     Short Term Goals:  Time Frame: 4 weels  Be independent with HEP as instructed to increase her ability to reach overhead.  Increase active left shoulder flexion to 130, abduction to 115, and ER at side to 50 to increase her ability to complete hair care.  Increase passive left shoulder flexion to 170, abduction to 130, and ER at 90 to 40 to increase her flexibility to allow patient to eventually use left arm to retrieve item from upper shelf.  Report pain going no higher than 2/10 at any time in left UE    Long Term Goals:  Time Frame: 12 weeks  Be able to use her left arm to retrieve item from drive thru or LU with no difficulty.  Be able to place lightweight item onto upper shelf using left UE without difficulty or pain.  Be able to turn steering wheel with left UE without any difficulty.    Patient Education:   []  HEP/Education Completed: Plan of Care, Goals, HEP - scapular retraction, backward shoulder circles, supine dowel for shoulder flexion and chest press, table slides for shoulder flexion and

## 2025-03-04 ENCOUNTER — HOSPITAL ENCOUNTER (OUTPATIENT)
Dept: OCCUPATIONAL THERAPY | Age: 75
Setting detail: THERAPIES SERIES
Discharge: HOME | End: 2025-03-04
Payer: MEDICARE

## 2025-03-04 PROCEDURE — 97110 THERAPEUTIC EXERCISES: CPT

## 2025-03-04 NOTE — PROGRESS NOTES
not apply route Expires in 5 years. Dx:, Disp: 1 each, Rfl: 0    lansoprazole (PREVACID) 30 MG delayed release capsule, TAKE 1 CAPSULE BY MOUTH 30 MINUTES BEFORE BREAKFAST ONCE DAILY, Disp: , Rfl:     latanoprost (XALATAN) 0.005 % ophthalmic solution, , Disp: , Rfl:     b complex vitamins capsule, Take 1 capsule by mouth daily, Disp: , Rfl:     ferrous sulfate (FE TABS 325) 325 (65 Fe) MG EC tablet, Take 1 tablet by mouth 3 times daily (with meals), Disp: , Rfl:     levothyroxine (SYNTHROID) 25 MCG tablet, TAKE 2 TABLETS ON MONDAY, WEDNESDAY, AND FRIDAY AND 1 TABLET ALL OTHER DAYS, Disp: 135 tablet, Rfl: 1    aspirin 81 MG EC tablet, Take 1 tablet by mouth daily, Disp: , Rfl:     ondansetron (ZOFRAN) 4 MG tablet, Take 1 tablet by mouth every 8 hours as needed for Nausea or Vomiting, Disp: 30 tablet, Rfl: 2    colchicine (COLCRYS) 0.6 MG tablet, Take 1 tablet by mouth daily, Disp: , Rfl:     allopurinol (ZYLOPRIM) 100 MG tablet, Take 1 tablet by mouth daily, Disp: , Rfl:     sulfaSALAzine (AZULFIDINE EN-TABS) 500 MG EC tablet, Take 1 tablet by mouth 2 times daily, Disp: , Rfl:     CALCIUM-VITAMIN D PO, Take by mouth, Disp: , Rfl:     atorvastatin (LIPITOR) 20 MG tablet, Take 1 tablet by mouth daily, Disp: 90 tablet, Rfl: 3    Semaglutide, 1 MG/DOSE, 4 MG/3ML SOPN, Inject 1 mg into the skin once a week, Disp: 3 mL, Rfl: 2    Probiotic Product (PROBIOTIC DAILY PO), Take by mouth 2 times daily, Disp: , Rfl:     citalopram (CELEXA) 20 MG tablet, Take 1 tablet by mouth daily, Disp: , Rfl:     Multiple Vitamins-Minerals (THERAPEUTIC MULTIVITAMIN-MINERALS) tablet, Take 1 tablet by mouth daily, Disp: , Rfl:     lisinopril (PRINIVIL;ZESTRIL) 2.5 MG tablet, Take 1 tablet by mouth daily, Disp: , Rfl:       Functional Outcome Measure Used SPADI   Functional Outcome Score 46 (1/31/25)       Insurance: Primary: Payor: [place] [employer] MEDICARE /  /  / ,   Secondary:    Authorization Information PRE CERTIFICATION REQUIRED: AUTHORIZATION

## 2025-03-07 ENCOUNTER — HOSPITAL ENCOUNTER (OUTPATIENT)
Dept: OCCUPATIONAL THERAPY | Age: 75
Setting detail: THERAPIES SERIES
Discharge: HOME | End: 2025-03-07
Payer: MEDICARE

## 2025-03-07 PROCEDURE — 97110 THERAPEUTIC EXERCISES: CPT

## 2025-03-10 ENCOUNTER — HOSPITAL ENCOUNTER (OUTPATIENT)
Dept: OCCUPATIONAL THERAPY | Age: 75
Setting detail: THERAPIES SERIES
Discharge: HOME | End: 2025-03-10
Payer: MEDICARE

## 2025-03-10 PROCEDURE — 97110 THERAPEUTIC EXERCISES: CPT

## 2025-03-10 NOTE — PROGRESS NOTES
shoulder circles, supine dowel for shoulder flexion and chest press, table slides for shoulder flexion and abduction  2/5/25: instructed patient to limit AROM of left shoulder per physician protocol; reviewed physician protocol with patient   2/12/25: supine AROM - shoulder flexion, horizontal abduction/adduction, ceiling punches, ceiling circles  2/21/25: to drag laundry basket versus carrying basket; only filling trash bags half full  3/4/25: goal status  3/7/25: shoulder isometrics- shoulder flexion, abduction, ER  3/10/25: instructed to decrease pressure applied with isometrics  []  No new Education completed  []  Reviewed Prior HEP      [x]  Patient verbalized and/or demonstrated understanding of education provided.  []  Patient unable to verbalize and/or demonstrate understanding of education provided.  Will continue education.  [x]  Barriers to learning: none    PLAN:    []  Plan of care initiated.  Plan to see patient 2 times per week for 12 weeks to address the treatment planned outlined above.  [x]  Continue with current plan of care  []  Modify plan of care as follows: to see patient 2 x week x 7 weeks from 3/4/25   []  Hold pending physician visit  []  Discharge    Time In 1005   Time Out 1045   Timed Code Minutes: 40 min   Total Treatment Time: 40 min       Amie Padilla, OTR/L #[id number]

## 2025-03-12 ENCOUNTER — APPOINTMENT (OUTPATIENT)
Dept: OCCUPATIONAL THERAPY | Age: 75
End: 2025-03-12
Payer: MEDICARE

## 2025-03-14 ENCOUNTER — HOSPITAL ENCOUNTER (OUTPATIENT)
Dept: PHYSICAL THERAPY | Age: 75
Setting detail: THERAPIES SERIES
Discharge: HOME | End: 2025-03-14
Payer: MEDICARE

## 2025-03-14 ENCOUNTER — HOSPITAL ENCOUNTER (OUTPATIENT)
Dept: OCCUPATIONAL THERAPY | Age: 75
Setting detail: THERAPIES SERIES
Discharge: HOME | End: 2025-03-14
Payer: MEDICARE

## 2025-03-14 PROCEDURE — 97161 PT EVAL LOW COMPLEX 20 MIN: CPT

## 2025-03-14 PROCEDURE — 97110 THERAPEUTIC EXERCISES: CPT

## 2025-03-14 NOTE — PROGRESS NOTES
[facility]  OCCUPATIONAL THERAPY  [] EVALUATION  [x] DAILY NOTE (LAND) [] DAILY NOTE (AQUATIC ) [] PROGRESS NOTE [] DISCHARGE NOTE    [] OUTPATIENT REHABILITATION CENTER [facility]   [] [place] [facility]    [x] [facility]   [] [facility]    Date: 3/14/2025  Patient Name:  Rosa Isela Short  : 1950  MRN: [id number]  CSN: [id number]    Referring Practitioner Lucy Delgadillo, APRN - CNP [phone]      Diagnosis  Diagnoses       M25.512 (ICD-10-CM) - Pain in left shoulder    Z96.612 (ICD-10-CM) - Presence of left artificial shoulder joint           Treatment Diagnosis M25.512  Left Shoulder Pain  M79.602  Left Arm Pain  M25.612 Stiffness of Left Shoulder   Date of Evaluation 25   Additional Pertinent History Rosa Isela Short has a past medical history of Anxiety, Blood transfusion, Diverticulosis of colon, Hyperlipidemia, Hypertension, Kidney stones, Localized, primary osteoarthritis of shoulder region, Obstruction of ascending colon (HCC), and T2DM (type 2 diabetes mellitus) ([facility]  she has a past surgical history that includes joint replacement (Right, 2009); Cholecystectomy; Foot surgery (Bilateral, ); shoulder surgery (Left, ); Lithotripsy; Colonoscopy; Endoscopy, colon, diagnostic; Meckel's diverticulum excision; Carpal tunnel release (Right); Hysterectomy (); hemicolectomy (N/A, 2022); and Bowel resection (2022).     Allergies Allergies   Allergen Reactions    Adhesive Tape       Medications   Current Outpatient Medications:     oxyCODONE-acetaminophen (PERCOCET) 5-325 MG per tablet, TAKE 1 TABLET BY MOUTH EVERY 6 HOURS AS NEEDED FOR PAIN FOR 7 DAYS, Disp: , Rfl:     metFORMIN (GLUCOPHAGE-XR) 500 MG extended release tablet, Take 2 tablets by mouth 2 times daily, Disp: 90 tablet, Rfl: 1    tolterodine (DETROL) 1 MG tablet, Take 1 tablet by mouth twice daily, Disp: 60 tablet, Rfl: 5    Handicap Placard MISC, by Does

## 2025-03-14 NOTE — PROGRESS NOTES
[facility]  PHYSICAL THERAPY  [x] EVALUATION  [] DAILY NOTE (LAND) [] DAILY NOTE (AQUATIC ) [] PROGRESS NOTE [] DISCHARGE NOTE    [] OUTPATIENT REHABILITATION CENTER [facility]   [] [place] [facility]    [x] [facility]   [] [facility]    Date: 3/14/2025  Patient Name:  Rosa Isela Short  : 1950  MRN: [id number]  CSN: [id number]    Referring Practitioner Mandy Barnard MD [phone]      Diagnosis  Diagnoses       M06.4 (ICD-10-CM) - Inflammatory polyarthropathy (HCC)    M15.0 (ICD-10-CM) - Primary generalized (osteo)arthritis           Treatment Diagnosis M25.551  Right Hip Pain  R53.1 Weakness  R26.2 Difficulty in walking   Date of Evaluation 3/14/25   Additional Pertinent History Rosa Isela Short has a past medical history of Anxiety, Blood transfusion, Diverticulosis of colon, Hyperlipidemia, Hypertension, Kidney stones, Localized, primary osteoarthritis of shoulder region, Obstruction of ascending colon (HCC), and T2DM (type 2 diabetes mellitus) ([facility]).  she has a past surgical history that includes joint replacement (Right, 2009); Cholecystectomy; Foot surgery (Bilateral, ); shoulder surgery (Left, ); Lithotripsy; Colonoscopy; Endoscopy, colon, diagnostic; Meckel's diverticulum excision; Carpal tunnel release (Right); Hysterectomy (); hemicolectomy (N/A, 2022); and Bowel resection (2022).     Allergies Allergies   Allergen Reactions    Adhesive Tape       Medications   Current Outpatient Medications:     oxyCODONE-acetaminophen (PERCOCET) 5-325 MG per tablet, TAKE 1 TABLET BY MOUTH EVERY 6 HOURS AS NEEDED FOR PAIN FOR 7 DAYS, Disp: , Rfl:     metFORMIN (GLUCOPHAGE-XR) 500 MG extended release tablet, Take 2 tablets by mouth 2 times daily, Disp: 90 tablet, Rfl: 1    tolterodine (DETROL) 1 MG tablet, Take 1 tablet by mouth twice daily, Disp: 60 tablet, Rfl: 5    Handicap Placard MISC, by Does not apply route Expires in 5

## 2025-03-17 ENCOUNTER — HOSPITAL ENCOUNTER (OUTPATIENT)
Dept: OCCUPATIONAL THERAPY | Age: 75
Setting detail: THERAPIES SERIES
Discharge: HOME | End: 2025-03-17
Payer: MEDICARE

## 2025-03-17 PROCEDURE — 97110 THERAPEUTIC EXERCISES: CPT

## 2025-03-17 NOTE — PROGRESS NOTES
weeks  Be able to use her left arm to retrieve item from drive thru or LU with no difficulty.   Be able to place lightweight item onto upper shelf using left UE without difficulty or pain.   Be able to turn steering wheel with left UE without any difficulty.     Patient Education:   [x]  HEP/Education Completed: Plan of Care, Goals, HEP - scapular retraction, backward shoulder circles, supine dowel for shoulder flexion and chest press, table slides for shoulder flexion and abduction  2/5/25: instructed patient to limit AROM of left shoulder per physician protocol; reviewed physician protocol with patient   2/12/25: supine AROM - shoulder flexion, horizontal abduction/adduction, ceiling punches, ceiling circles  2/21/25: to drag laundry basket versus carrying basket; only filling trash bags half full  3/4/25: goal status  3/7/25: shoulder isometrics- shoulder flexion, abduction, ER  3/10/25: instructed to decrease pressure applied with isometrics  3/141/25: to add 1# to supine AROM; bilateral wall slides  []  No new Education completed  []  Reviewed Prior HEP      [x]  Patient verbalized and/or demonstrated understanding of education provided.  []  Patient unable to verbalize and/or demonstrate understanding of education provided.  Will continue education.  [x]  Barriers to learning: none    PLAN:    []  Plan of care initiated.  Plan to see patient 2 times per week for 12 weeks to address the treatment planned outlined above.  [x]  Continue with current plan of care  []  Modify plan of care as follows: to see patient 2 x week x 7 weeks from 3/4/25   []  Hold pending physician visit  []  Discharge    Time In 1430   Time Out 1514   Timed Code Minutes: 44 min   Total Treatment Time: 44 min       Cheryl Sheldon MOT OTR/L [id number]

## 2025-03-18 RX ORDER — METFORMIN HYDROCHLORIDE 500 MG/1
1000 TABLET, EXTENDED RELEASE ORAL 2 TIMES DAILY
Qty: 90 TABLET | Refills: 0 | Status: SHIPPED | OUTPATIENT
Start: 2025-03-18

## 2025-03-19 ENCOUNTER — HOSPITAL ENCOUNTER (OUTPATIENT)
Dept: PHYSICAL THERAPY | Age: 75
Setting detail: THERAPIES SERIES
Discharge: HOME | End: 2025-03-19
Payer: MEDICARE

## 2025-03-19 PROCEDURE — 97035 APP MDLTY 1+ULTRASOUND EA 15: CPT

## 2025-03-19 PROCEDURE — 97140 MANUAL THERAPY 1/> REGIONS: CPT

## 2025-03-19 PROCEDURE — 97110 THERAPEUTIC EXERCISES: CPT

## 2025-03-19 NOTE — PROGRESS NOTES
[]  Hold pending physician visit  []  Discharge    Time In 0830   Time Out 0910   Timed Code Minutes: 40 min   Total Treatment Time: 40 min       Electronically Signed by: [place] [id number]

## 2025-03-21 ENCOUNTER — HOSPITAL ENCOUNTER (OUTPATIENT)
Dept: OCCUPATIONAL THERAPY | Age: 75
Setting detail: THERAPIES SERIES
Discharge: HOME | End: 2025-03-21
Payer: MEDICARE

## 2025-03-21 ENCOUNTER — HOSPITAL ENCOUNTER (OUTPATIENT)
Dept: PHYSICAL THERAPY | Age: 75
Setting detail: THERAPIES SERIES
Discharge: HOME | End: 2025-03-21
Payer: MEDICARE

## 2025-03-21 PROCEDURE — 97035 APP MDLTY 1+ULTRASOUND EA 15: CPT

## 2025-03-21 PROCEDURE — 97110 THERAPEUTIC EXERCISES: CPT

## 2025-03-21 PROCEDURE — 97140 MANUAL THERAPY 1/> REGIONS: CPT

## 2025-03-21 NOTE — PROGRESS NOTES
shoulder circles, supine dowel for shoulder flexion and chest press, table slides for shoulder flexion and abduction  2/5/25: instructed patient to limit AROM of left shoulder per physician protocol; reviewed physician protocol with patient   2/12/25: supine AROM - shoulder flexion, horizontal abduction/adduction, ceiling punches, ceiling circles  2/21/25: to drag laundry basket versus carrying basket; only filling trash bags half full  3/4/25: goal status  3/7/25: shoulder isometrics- shoulder flexion, abduction, ER  3/10/25: instructed to decrease pressure applied with isometrics  3/14/25: to add 1# to supine AROM; bilateral wall slides  3/21/25: goal status; peach theraband - supine riivalid and bilateral ER at side  []  No new Education completed  []  Reviewed Prior HEP      [x]  Patient verbalized and/or demonstrated understanding of education provided.  []  Patient unable to verbalize and/or demonstrate understanding of education provided.  Will continue education.  [x]  Barriers to learning: none    PLAN:    []  Plan of care initiated.  Plan to see patient 2 times per week for 12 weeks to address the treatment planned outlined above.  []  Continue with current plan of care  [x]  Modify plan of care as follows: to see patient 2 x week x 8 weeks from 3/21/25   []  Hold pending physician visit  []  Discharge    Time In 0920   Time Out 1000   Timed Code Minutes: 40 min   Total Treatment Time: 40 min     Amie Padilla, OTR/L #[id number]

## 2025-03-21 NOTE — PROGRESS NOTES
[facility]  PHYSICAL THERAPY  [] EVALUATION  [x] DAILY NOTE (LAND) [] DAILY NOTE (AQUATIC ) [] PROGRESS NOTE [] DISCHARGE NOTE    [] OUTPATIENT REHABILITATION CENTER [facility]   [] [place] [facility]    [x] [facility]   [] [facility]    Date: 3/21/2025  Patient Name:  Rosa Isela Short  : 1950  MRN: [id number]  CSN: [id number]    Referring Practitioner Mandy Barnard MD [id number]      Diagnosis  Diagnoses       M06.4 (ICD-10-CM) - Inflammatory polyarthropathy (HCC)    M15.0 (ICD-10-CM) - Primary generalized (osteo)arthritis           Treatment Diagnosis M25.551  Right Hip Pain  R53.1 Weakness  R26.2 Difficulty in walking   Date of Evaluation 3/14/25   Additional Pertinent History Rosa Isela Short has a past medical history of Anxiety, Blood transfusion, Diverticulosis of colon, Hyperlipidemia, Hypertension, Kidney stones, Localized, primary osteoarthritis of shoulder region, Obstruction of ascending colon (HCC), and T2DM (type 2 diabetes mellitus) ([facility]).  she has a past surgical history that includes joint replacement (Right, 2009); Cholecystectomy; Foot surgery (Bilateral, ); shoulder surgery (Left, ); Lithotripsy; Colonoscopy; Endoscopy, colon, diagnostic; Meckel's diverticulum excision; Carpal tunnel release (Right); Hysterectomy (); hemicolectomy (N/A, 2022); and Bowel resection (2022).     Allergies Allergies   Allergen Reactions    Adhesive Tape       Medications   Current Outpatient Medications:     metFORMIN (GLUCOPHAGE-XR) 500 MG extended release tablet, Take 2 tablets by mouth twice daily, Disp: 90 tablet, Rfl: 0    oxyCODONE-acetaminophen (PERCOCET) 5-325 MG per tablet, TAKE 1 TABLET BY MOUTH EVERY 6 HOURS AS NEEDED FOR PAIN FOR 7 DAYS, Disp: , Rfl:     tolterodine (DETROL) 1 MG tablet, Take 1 tablet by mouth twice daily, Disp: 60 tablet, Rfl: 5    Handicap Placard MISC, by Does not apply route Expires in 5

## 2025-03-24 ENCOUNTER — TELEPHONE (OUTPATIENT)
Dept: FAMILY MEDICINE CLINIC | Age: 75
End: 2025-03-24

## 2025-03-25 ENCOUNTER — HOSPITAL ENCOUNTER (OUTPATIENT)
Dept: OCCUPATIONAL THERAPY | Age: 75
Setting detail: THERAPIES SERIES
Discharge: HOME | End: 2025-03-25
Payer: MEDICARE

## 2025-03-25 ENCOUNTER — HOSPITAL ENCOUNTER (OUTPATIENT)
Dept: PHYSICAL THERAPY | Age: 75
Setting detail: THERAPIES SERIES
Discharge: HOME | End: 2025-03-25
Payer: MEDICARE

## 2025-03-25 PROCEDURE — 97035 APP MDLTY 1+ULTRASOUND EA 15: CPT

## 2025-03-25 PROCEDURE — 97140 MANUAL THERAPY 1/> REGIONS: CPT

## 2025-03-25 PROCEDURE — 97110 THERAPEUTIC EXERCISES: CPT

## 2025-03-25 NOTE — PROGRESS NOTES
[facility]  OCCUPATIONAL THERAPY  [] EVALUATION  [x] DAILY NOTE (LAND) [] DAILY NOTE (AQUATIC ) [] PROGRESS NOTE [] DISCHARGE NOTE    [] OUTPATIENT REHABILITATION CENTER [facility]   [] [place] [facility]    [x] [facility]   [] [facility]    Date: 3/25/2025  Patient Name:  Rosa Isela Short  : 1950  MRN: [id number]  CSN: [id number]    Referring Practitioner Lucy Delgadillo, APRN - CNP [id number]      Diagnosis  Diagnoses       M25.512 (ICD-10-CM) - Pain in left shoulder    Z96.612 (ICD-10-CM) - Presence of left artificial shoulder joint           Treatment Diagnosis M25.512  Left Shoulder Pain  M79.602  Left Arm Pain  M25.612 Stiffness of Left Shoulder   Date of Evaluation 25   Additional Pertinent History Rosa Isela Short has a past medical history of Anxiety, Blood transfusion, Diverticulosis of colon, Hyperlipidemia, Hypertension, Kidney stones, Localized, primary osteoarthritis of shoulder region, Obstruction of ascending colon (HCC), and T2DM (type 2 diabetes mellitus) ([facility]).  she has a past surgical history that includes joint replacement (Right, 2009); Cholecystectomy; Foot surgery (Bilateral, ); shoulder surgery (Left, ); Lithotripsy; Colonoscopy; Endoscopy, colon, diagnostic; Meckel's diverticulum excision; Carpal tunnel release (Right); Hysterectomy (); hemicolectomy (N/A, 2022); and Bowel resection (2022).     Allergies Allergies   Allergen Reactions    Adhesive Tape       Medications   Current Outpatient Medications:     metFORMIN (GLUCOPHAGE-XR) 500 MG extended release tablet, Take 2 tablets by mouth twice daily, Disp: 90 tablet, Rfl: 0    oxyCODONE-acetaminophen (PERCOCET) 5-325 MG per tablet, TAKE 1 TABLET BY MOUTH EVERY 6 HOURS AS NEEDED FOR PAIN FOR 7 DAYS, Disp: , Rfl:     tolterodine (DETROL) 1 MG tablet, Take 1 tablet by mouth twice daily, Disp: 60 tablet, Rfl: 5    Handicap Placard MISC, by Does

## 2025-03-25 NOTE — PROGRESS NOTES
[facility]  PHYSICAL THERAPY  [] EVALUATION  [x] DAILY NOTE (LAND) [] DAILY NOTE (AQUATIC ) [] PROGRESS NOTE [] DISCHARGE NOTE    [] OUTPATIENT REHABILITATION CENTER [facility]   [] [place] [facility]    [x] [facility]   [] [facility]    Date: 3/25/2025  Patient Name:  Rosa Isela Short  : 1950  MRN: [id number]  CSN: [id number]    Referring Practitioner Mandy Barnard MD [id number]      Diagnosis  Diagnoses       M06.4 (ICD-10-CM) - Inflammatory polyarthropathy (HCC)    M15.0 (ICD-10-CM) - Primary generalized (osteo)arthritis           Treatment Diagnosis M25.551  Right Hip Pain  R53.1 Weakness  R26.2 Difficulty in walking   Date of Evaluation 3/14/25   Additional Pertinent History Rosa Isela Short has a past medical history of Anxiety, Blood transfusion, Diverticulosis of colon, Hyperlipidemia, Hypertension, Kidney stones, Localized, primary osteoarthritis of shoulder region, Obstruction of ascending colon (HCC), and T2DM (type 2 diabetes mellitus) ([facility]).  she has a past surgical history that includes joint replacement (Right, 2009); Cholecystectomy; Foot surgery (Bilateral, ); shoulder surgery (Left, ); Lithotripsy; Colonoscopy; Endoscopy, colon, diagnostic; Meckel's diverticulum excision; Carpal tunnel release (Right); Hysterectomy (); hemicolectomy (N/A, 2022); and Bowel resection (2022).     Allergies Allergies   Allergen Reactions    Adhesive Tape       Medications   Current Outpatient Medications:     metFORMIN (GLUCOPHAGE-XR) 500 MG extended release tablet, Take 2 tablets by mouth twice daily, Disp: 90 tablet, Rfl: 0    oxyCODONE-acetaminophen (PERCOCET) 5-325 MG per tablet, TAKE 1 TABLET BY MOUTH EVERY 6 HOURS AS NEEDED FOR PAIN FOR 7 DAYS, Disp: , Rfl:     tolterodine (DETROL) 1 MG tablet, Take 1 tablet by mouth twice daily, Disp: 60 tablet, Rfl: 5    Handicap Placard MISC, by Does not apply route Expires in 5

## 2025-03-28 ENCOUNTER — HOSPITAL ENCOUNTER (OUTPATIENT)
Dept: PHYSICAL THERAPY | Age: 75
Setting detail: THERAPIES SERIES
Discharge: HOME | End: 2025-03-28
Payer: MEDICARE

## 2025-03-28 ENCOUNTER — HOSPITAL ENCOUNTER (OUTPATIENT)
Dept: OCCUPATIONAL THERAPY | Age: 75
Setting detail: THERAPIES SERIES
Discharge: HOME | End: 2025-03-28
Payer: MEDICARE

## 2025-03-28 PROCEDURE — 97110 THERAPEUTIC EXERCISES: CPT

## 2025-03-28 PROCEDURE — 97035 APP MDLTY 1+ULTRASOUND EA 15: CPT

## 2025-03-28 PROCEDURE — 97140 MANUAL THERAPY 1/> REGIONS: CPT

## 2025-03-28 NOTE — PROGRESS NOTES
[facility]  PHYSICAL THERAPY  [] EVALUATION  [x] DAILY NOTE (LAND) [] DAILY NOTE (AQUATIC ) [] PROGRESS NOTE [] DISCHARGE NOTE    [] OUTPATIENT REHABILITATION CENTER [facility]   [] [place] [facility]    [x] [facility]   [] [facility]    Date: 3/28/2025  Patient Name:  Rosa Isela Short  : 1950  MRN: [id number]  CSN: [id number]    Referring Practitioner Mandy Barnard MD [id number]      Diagnosis  Diagnoses       M06.4 (ICD-10-CM) - Inflammatory polyarthropathy (HCC)    M15.0 (ICD-10-CM) - Primary generalized (osteo)arthritis           Treatment Diagnosis M25.551  Right Hip Pain  R53.1 Weakness  R26.2 Difficulty in walking   Date of Evaluation 3/14/25   Additional Pertinent History Rosa Isela Short has a past medical history of Anxiety, Blood transfusion, Diverticulosis of colon, Hyperlipidemia, Hypertension, Kidney stones, Localized, primary osteoarthritis of shoulder region, Obstruction of ascending colon (HCC), and T2DM (type 2 diabetes mellitus) ([facility]).  she has a past surgical history that includes joint replacement (Right, 2009); Cholecystectomy; Foot surgery (Bilateral, ); shoulder surgery (Left, ); Lithotripsy; Colonoscopy; Endoscopy, colon, diagnostic; Meckel's diverticulum excision; Carpal tunnel release (Right); Hysterectomy (); hemicolectomy (N/A, 2022); and Bowel resection (2022).     Allergies Allergies   Allergen Reactions    Adhesive Tape       Medications   Current Outpatient Medications:     metFORMIN (GLUCOPHAGE-XR) 500 MG extended release tablet, Take 2 tablets by mouth twice daily, Disp: 90 tablet, Rfl: 0    oxyCODONE-acetaminophen (PERCOCET) 5-325 MG per tablet, TAKE 1 TABLET BY MOUTH EVERY 6 HOURS AS NEEDED FOR PAIN FOR 7 DAYS, Disp: , Rfl:     tolterodine (DETROL) 1 MG tablet, Take 1 tablet by mouth twice daily, Disp: 60 tablet, Rfl: 5    Handicap Placard MISC, by Does not apply route Expires in 5

## 2025-03-28 NOTE — PROGRESS NOTES
left arm in normal activities.    Report pain going no higher than 1/10 in left shoulder at any time to assist with normal daily tasks.     Long Term Goals:  Time Frame: 8 weeks  Be able to use her left arm to retrieve item from drive thru or LU with no difficulty.   Be able to place lightweight item onto upper shelf using left UE without difficulty or pain.     Be able to use left arm to move groceries from cart to car without any pain or difficulty.   Increase left shoulder MMT to at least 4/5 for all motions to increase her ability to complete all household tasks and work tasks with no difficulty.    Patient Education:   []  HEP/Education Completed: Plan of Care, Goals, HEP - scapular retraction, backward shoulder circles, supine dowel for shoulder flexion and chest press, table slides for shoulder flexion and abduction  2/5/25: instructed patient to limit AROM of left shoulder per physician protocol; reviewed physician protocol with patient   2/12/25: supine AROM - shoulder flexion, horizontal abduction/adduction, ceiling punches, ceiling circles  2/21/25: to drag laundry basket versus carrying basket; only filling trash bags half full  3/4/25: goal status  3/7/25: shoulder isometrics- shoulder flexion, abduction, ER  3/10/25: instructed to decrease pressure applied with isometrics  3/14/25: to add 1# to supine AROM; bilateral wall slides  3/21/25: goal status; peach theraband - supine riivalid and bilateral ER at side  [x]  No new Education completed  []  Reviewed Prior HEP      [x]  Patient verbalized and/or demonstrated understanding of education provided.  []  Patient unable to verbalize and/or demonstrate understanding of education provided.  Will continue education.  [x]  Barriers to learning: none    PLAN:    []  Plan of care initiated.  Plan to see patient 2 times per week for 12 weeks to address the treatment planned outlined above.  [x]  Continue with current plan of care  []  Modify plan of care as

## 2025-03-31 ENCOUNTER — HOSPITAL ENCOUNTER (OUTPATIENT)
Dept: OCCUPATIONAL THERAPY | Age: 75
Setting detail: THERAPIES SERIES
Discharge: HOME | End: 2025-03-31
Payer: MEDICARE

## 2025-03-31 ENCOUNTER — HOSPITAL ENCOUNTER (OUTPATIENT)
Dept: PHYSICAL THERAPY | Age: 75
Setting detail: THERAPIES SERIES
Discharge: HOME | End: 2025-03-31
Payer: MEDICARE

## 2025-03-31 PROCEDURE — 97110 THERAPEUTIC EXERCISES: CPT

## 2025-03-31 PROCEDURE — 97035 APP MDLTY 1+ULTRASOUND EA 15: CPT

## 2025-03-31 PROCEDURE — 97140 MANUAL THERAPY 1/> REGIONS: CPT

## 2025-03-31 NOTE — PROGRESS NOTES
[facility]  PHYSICAL THERAPY  [] EVALUATION  [x] DAILY NOTE (LAND) [] DAILY NOTE (AQUATIC ) [] PROGRESS NOTE [] DISCHARGE NOTE    [] OUTPATIENT REHABILITATION CENTER [facility]   [] [place] [facility]    [x] [facility]   [] [facility]    Date: 3/31/2025  Patient Name:  Rosa Isela Short  : 1950  MRN: [id number]  CSN: [id number]    Referring Practitioner Mandy Barnard MD [id number]      Diagnosis  Diagnoses       M06.4 (ICD-10-CM) - Inflammatory polyarthropathy (HCC)    M15.0 (ICD-10-CM) - Primary generalized (osteo)arthritis           Treatment Diagnosis M25.551  Right Hip Pain  R53.1 Weakness  R26.2 Difficulty in walking   Date of Evaluation 3/14/25   Additional Pertinent History Rosa Isela Short has a past medical history of Anxiety, Blood transfusion, Diverticulosis of colon, Hyperlipidemia, Hypertension, Kidney stones, Localized, primary osteoarthritis of shoulder region, Obstruction of ascending colon (HCC), and T2DM (type 2 diabetes mellitus) ([facility]).  she has a past surgical history that includes joint replacement (Right, 2009); Cholecystectomy; Foot surgery (Bilateral, ); shoulder surgery (Left, ); Lithotripsy; Colonoscopy; Endoscopy, colon, diagnostic; Meckel's diverticulum excision; Carpal tunnel release (Right); Hysterectomy (); hemicolectomy (N/A, 2022); and Bowel resection (2022).     Allergies Allergies   Allergen Reactions    Adhesive Tape       Medications   Current Outpatient Medications:     metFORMIN (GLUCOPHAGE-XR) 500 MG extended release tablet, Take 2 tablets by mouth twice daily, Disp: 90 tablet, Rfl: 0    oxyCODONE-acetaminophen (PERCOCET) 5-325 MG per tablet, TAKE 1 TABLET BY MOUTH EVERY 6 HOURS AS NEEDED FOR PAIN FOR 7 DAYS, Disp: , Rfl:     tolterodine (DETROL) 1 MG tablet, Take 1 tablet by mouth twice daily, Disp: 60 tablet, Rfl: 5    Handicap Placard MISC, by Does not apply route Expires in 5

## 2025-03-31 NOTE — PROGRESS NOTES
[facility]  OCCUPATIONAL THERAPY  [] EVALUATION  [x] DAILY NOTE (LAND) [] DAILY NOTE (AQUATIC ) [] PROGRESS NOTE [] DISCHARGE NOTE    [] OUTPATIENT REHABILITATION CENTER [facility]   [] [place] [facility]    [x] [facility]   [] [facility]    Date: 3/31/2025  Patient Name:  Rosa Isela Short  : 1950  MRN: [id number]  CSN: [id number]    Referring Practitioner Lucy Delgadillo, APRN - CNP [id number]      Diagnosis  Diagnoses       M25.512 (ICD-10-CM) - Pain in left shoulder    Z96.612 (ICD-10-CM) - Presence of left artificial shoulder joint           Treatment Diagnosis M25.512  Left Shoulder Pain  M79.602  Left Arm Pain  M25.612 Stiffness of Left Shoulder   Date of Evaluation 25   Additional Pertinent History Rosa Isela Short has a past medical history of Anxiety, Blood transfusion, Diverticulosis of colon, Hyperlipidemia, Hypertension, Kidney stones, Localized, primary osteoarthritis of shoulder region, Obstruction of ascending colon (HCC), and T2DM (type 2 diabetes mellitus) ([facility]).  she has a past surgical history that includes joint replacement (Right, 2009); Cholecystectomy; Foot surgery (Bilateral, ); shoulder surgery (Left, ); Lithotripsy; Colonoscopy; Endoscopy, colon, diagnostic; Meckel's diverticulum excision; Carpal tunnel release (Right); Hysterectomy (); hemicolectomy (N/A, 2022); and Bowel resection (2022).     Allergies Allergies   Allergen Reactions    Adhesive Tape       Medications   Current Outpatient Medications:     metFORMIN (GLUCOPHAGE-XR) 500 MG extended release tablet, Take 2 tablets by mouth twice daily, Disp: 90 tablet, Rfl: 0    oxyCODONE-acetaminophen (PERCOCET) 5-325 MG per tablet, TAKE 1 TABLET BY MOUTH EVERY 6 HOURS AS NEEDED FOR PAIN FOR 7 DAYS, Disp: , Rfl:     tolterodine (DETROL) 1 MG tablet, Take 1 tablet by mouth twice daily, Disp: 60 tablet, Rfl: 5    Handicap Placard MISC, by Does

## 2025-04-04 ENCOUNTER — HOSPITAL ENCOUNTER (OUTPATIENT)
Dept: PHYSICAL THERAPY | Age: 75
Setting detail: THERAPIES SERIES
Discharge: HOME | End: 2025-04-04
Payer: MEDICARE

## 2025-04-04 ENCOUNTER — HOSPITAL ENCOUNTER (OUTPATIENT)
Dept: OCCUPATIONAL THERAPY | Age: 75
Setting detail: THERAPIES SERIES
Discharge: HOME | End: 2025-04-04
Payer: MEDICARE

## 2025-04-04 PROCEDURE — 97110 THERAPEUTIC EXERCISES: CPT

## 2025-04-04 PROCEDURE — 97035 APP MDLTY 1+ULTRASOUND EA 15: CPT

## 2025-04-04 PROCEDURE — 97140 MANUAL THERAPY 1/> REGIONS: CPT

## 2025-04-04 NOTE — PROGRESS NOTES
[facility]  OCCUPATIONAL THERAPY  [] EVALUATION  [x] DAILY NOTE (LAND) [] DAILY NOTE (AQUATIC ) [] PROGRESS NOTE [] DISCHARGE NOTE    [] OUTPATIENT REHABILITATION CENTER [facility]   [] [place] [facility]ENTER    [x] [facility]   [] [facility]    Date: 2025  Patient Name:  Rosa Isela Short  : 1950  MRN: [id number]  CSN: [id number]    Referring Practitioner Lucy Delgadillo, APRN - CNP [phone]      Diagnosis  Diagnoses       M25.512 (ICD-10-CM) - Pain in left shoulder    Z96.612 (ICD-10-CM) - Presence of left artificial shoulder joint           Treatment Diagnosis M25.512  Left Shoulder Pain  M79.602  Left Arm Pain  M25.612 Stiffness of Left Shoulder   Date of Evaluation 25   Additional Pertinent History Rosa Isela Short has a past medical history of Anxiety, Blood transfusion, Diverticulosis of colon, Hyperlipidemia, Hypertension, Kidney stones, Localized, primary osteoarthritis of shoulder region, Obstruction of ascending colon (HCC), and T2DM (type 2 diabetes mellitus) ([facility]).  she has a past surgical history that includes joint replacement (Right, 2009); Cholecystectomy; Foot surgery (Bilateral, ); shoulder surgery (Left, ); Lithotripsy; Colonoscopy; Endoscopy, colon, diagnostic; Meckel's diverticulum excision; Carpal tunnel release (Right); Hysterectomy (); hemicolectomy (N/A, 2022); and Bowel resection (2022).     Allergies Allergies   Allergen Reactions    Adhesive Tape       Medications   Current Outpatient Medications:     metFORMIN (GLUCOPHAGE-XR) 500 MG extended release tablet, Take 2 tablets by mouth twice daily, Disp: 90 tablet, Rfl: 0    oxyCODONE-acetaminophen (PERCOCET) 5-325 MG per tablet, TAKE 1 TABLET BY MOUTH EVERY 6 HOURS AS NEEDED FOR PAIN FOR 7 DAYS, Disp: , Rfl:     tolterodine (DETROL) 1 MG tablet, Take 1 tablet by mouth twice daily, Disp: 60 tablet, Rfl: 5    Handicap Placard MISC, by Does not

## 2025-04-04 NOTE — PROGRESS NOTES
[facility]  PHYSICAL THERAPY  [] EVALUATION  [x] DAILY NOTE (LAND) [] DAILY NOTE (AQUATIC ) [] PROGRESS NOTE [] DISCHARGE NOTE    [] OUTPATIENT REHABILITATION CENTER [facility]   [] [place] [facility]ARE CENTER    [x] [facility]   [] [facility]    Date: 2025  Patient Name:  Rosa Isela Short  : 1950  MRN: [id number]  CSN: [id number]    Referring Practitioner Mandy Barnard MD [id number]      Diagnosis  Diagnoses       M06.4 (ICD-10-CM) - Inflammatory polyarthropathy (HCC)    M15.0 (ICD-10-CM) - Primary generalized (osteo)arthritis           Treatment Diagnosis M25.551  Right Hip Pain  R53.1 Weakness  R26.2 Difficulty in walking   Date of Evaluation 3/14/25   Additional Pertinent History Rosa Isela Short has a past medical history of Anxiety, Blood transfusion, Diverticulosis of colon, Hyperlipidemia, Hypertension, Kidney stones, Localized, primary osteoarthritis of shoulder region, Obstruction of ascending colon (HCC), and T2DM (type 2 diabetes mellitus) ([facility]).  she has a past surgical history that includes joint replacement (Right, 2009); Cholecystectomy; Foot surgery (Bilateral, ); shoulder surgery (Left, ); Lithotripsy; Colonoscopy; Endoscopy, colon, diagnostic; Meckel's diverticulum excision; Carpal tunnel release (Right); Hysterectomy (); hemicolectomy (N/A, 2022); and Bowel resection (2022).     Allergies Allergies   Allergen Reactions    Adhesive Tape       Medications   Current Outpatient Medications:     metFORMIN (GLUCOPHAGE-XR) 500 MG extended release tablet, Take 2 tablets by mouth twice daily, Disp: 90 tablet, Rfl: 0    oxyCODONE-acetaminophen (PERCOCET) 5-325 MG per tablet, TAKE 1 TABLET BY MOUTH EVERY 6 HOURS AS NEEDED FOR PAIN FOR 7 DAYS, Disp: , Rfl:     tolterodine (DETROL) 1 MG tablet, Take 1 tablet by mouth twice daily, Disp: 60 tablet, Rfl: 5    Handicap Placard MISC, by Does not apply route Expires in 5

## 2025-04-06 DIAGNOSIS — E11.9 TYPE 2 DIABETES MELLITUS WITHOUT COMPLICATION, WITHOUT LONG-TERM CURRENT USE OF INSULIN: ICD-10-CM

## 2025-04-07 ENCOUNTER — HOSPITAL ENCOUNTER (OUTPATIENT)
Dept: OCCUPATIONAL THERAPY | Age: 75
Setting detail: THERAPIES SERIES
Discharge: HOME | End: 2025-04-07
Payer: MEDICARE

## 2025-04-07 ENCOUNTER — HOSPITAL ENCOUNTER (OUTPATIENT)
Dept: PHYSICAL THERAPY | Age: 75
Setting detail: THERAPIES SERIES
Discharge: HOME | End: 2025-04-07
Payer: MEDICARE

## 2025-04-07 PROCEDURE — 97110 THERAPEUTIC EXERCISES: CPT

## 2025-04-07 PROCEDURE — 97140 MANUAL THERAPY 1/> REGIONS: CPT

## 2025-04-07 RX ORDER — SEMAGLUTIDE 1.34 MG/ML
INJECTION, SOLUTION SUBCUTANEOUS
Qty: 3 ML | Refills: 0 | Status: SHIPPED | OUTPATIENT
Start: 2025-04-07

## 2025-04-07 NOTE — PROGRESS NOTES
by mouth twice daily, Disp: 60 tablet, Rfl: 5    Handicap Placard MISC, by Does not apply route Expires in 5 years. Dx:, Disp: 1 each, Rfl: 0    lansoprazole (PREVACID) 30 MG delayed release capsule, TAKE 1 CAPSULE BY MOUTH 30 MINUTES BEFORE BREAKFAST ONCE DAILY, Disp: , Rfl:     latanoprost (XALATAN) 0.005 % ophthalmic solution, , Disp: , Rfl:     b complex vitamins capsule, Take 1 capsule by mouth daily, Disp: , Rfl:     ferrous sulfate (FE TABS 325) 325 (65 Fe) MG EC tablet, Take 1 tablet by mouth 3 times daily (with meals), Disp: , Rfl:     levothyroxine (SYNTHROID) 25 MCG tablet, TAKE 2 TABLETS ON MONDAY, WEDNESDAY, AND FRIDAY AND 1 TABLET ALL OTHER DAYS, Disp: 135 tablet, Rfl: 1    aspirin 81 MG EC tablet, Take 1 tablet by mouth daily, Disp: , Rfl:     ondansetron (ZOFRAN) 4 MG tablet, Take 1 tablet by mouth every 8 hours as needed for Nausea or Vomiting, Disp: 30 tablet, Rfl: 2    colchicine (COLCRYS) 0.6 MG tablet, Take 1 tablet by mouth daily, Disp: , Rfl:     allopurinol (ZYLOPRIM) 100 MG tablet, Take 1 tablet by mouth daily, Disp: , Rfl:     sulfaSALAzine (AZULFIDINE EN-TABS) 500 MG EC tablet, Take 1 tablet by mouth 2 times daily, Disp: , Rfl:     CALCIUM-VITAMIN D PO, Take by mouth, Disp: , Rfl:     atorvastatin (LIPITOR) 20 MG tablet, Take 1 tablet by mouth daily, Disp: 90 tablet, Rfl: 3    Probiotic Product (PROBIOTIC DAILY PO), Take by mouth 2 times daily, Disp: , Rfl:     citalopram (CELEXA) 20 MG tablet, Take 1 tablet by mouth daily, Disp: , Rfl:     Multiple Vitamins-Minerals (THERAPEUTIC MULTIVITAMIN-MINERALS) tablet, Take 1 tablet by mouth daily, Disp: , Rfl:     lisinopril (PRINIVIL;ZESTRIL) 2.5 MG tablet, Take 1 tablet by mouth daily, Disp: , Rfl:       Functional Outcome Measure Used LEFS   Functional Outcome Score 52 (3/14/25), PN 54 (4-7-25)      Insurance: Primary: Payor: [facility] MEDICARE /  /  / ,   Secondary:    Authorization Information PRE CERTIFICATION REQUIRED: AUTHORIZATION REQUIRED

## 2025-04-07 NOTE — TELEPHONE ENCOUNTER
Last visit- 2/26/2025  Next visit- 4/29/2025    Requested Prescriptions     Pending Prescriptions Disp Refills    OZEMPIC, 1 MG/DOSE, 4 MG/3ML SOPN sc injection [Pharmacy Med Name: Ozempic (1 MG/DOSE) 4 MG/3ML Subcutaneous Solution Pen-injector] 3 mL 0     Sig: INJECT 1 MG SUBCUTANEOUSLY ONCE A WEEK

## 2025-04-07 NOTE — PROGRESS NOTES
[facility]  OCCUPATIONAL THERAPY  [] EVALUATION  [x] DAILY NOTE (LAND) [] DAILY NOTE (AQUATIC ) [] PROGRESS NOTE [] DISCHARGE NOTE    [] OUTPATIENT REHABILITATION CENTER [facility]   [] [place] [facility]    [x] [facility]   [] [facility]    Date: 2025  Patient Name:  Rosa Isela Short  : 1950  MRN: [id number]  CSN: [id number]    Referring Practitioner Lucy Delgadillo, APRN - CNP [phone]      Diagnosis  Diagnoses       M25.512 (ICD-10-CM) - Pain in left shoulder    Z96.612 (ICD-10-CM) - Presence of left artificial shoulder joint           Treatment Diagnosis M25.512  Left Shoulder Pain  M79.602  Left Arm Pain  M25.612 Stiffness of Left Shoulder   Date of Evaluation 25   Additional Pertinent History Rosa Isela Short has a past medical history of Anxiety, Blood transfusion, Diverticulosis of colon, Hyperlipidemia, Hypertension, Kidney stones, Localized, primary osteoarthritis of shoulder region, Obstruction of ascending colon (HCC), and T2DM (type 2 diabetes mellitus) ([facility]).  she has a past surgical history that includes joint replacement (Right, 2009); Cholecystectomy; Foot surgery (Bilateral, ); shoulder surgery (Left, ); Lithotripsy; Colonoscopy; Endoscopy, colon, diagnostic; Meckel's diverticulum excision; Carpal tunnel release (Right); Hysterectomy (); hemicolectomy (N/A, 2022); and Bowel resection (2022).     Allergies Allergies   Allergen Reactions    Adhesive Tape       Medications   Current Outpatient Medications:     OZEMPIC, 1 MG/DOSE, 4 MG/3ML SOPN sc injection, INJECT 1 MG SUBCUTANEOUSLY ONCE A WEEK, Disp: 3 mL, Rfl: 0    metFORMIN (GLUCOPHAGE-XR) 500 MG extended release tablet, Take 2 tablets by mouth twice daily, Disp: 90 tablet, Rfl: 0    oxyCODONE-acetaminophen (PERCOCET) 5-325 MG per tablet, TAKE 1 TABLET BY MOUTH EVERY 6 HOURS AS NEEDED FOR PAIN FOR 7 DAYS, Disp: , Rfl:     tolterodine (DETROL)

## 2025-04-10 ENCOUNTER — HOSPITAL ENCOUNTER (OUTPATIENT)
Dept: OCCUPATIONAL THERAPY | Age: 75
Setting detail: THERAPIES SERIES
Discharge: HOME | End: 2025-04-10
Payer: MEDICARE

## 2025-04-10 ENCOUNTER — HOSPITAL ENCOUNTER (OUTPATIENT)
Dept: PHYSICAL THERAPY | Age: 75
Setting detail: THERAPIES SERIES
Discharge: HOME | End: 2025-04-10
Payer: MEDICARE

## 2025-04-10 PROCEDURE — 97110 THERAPEUTIC EXERCISES: CPT

## 2025-04-10 NOTE — PROGRESS NOTES
1. Caller Name: Boy Tan                Call Back Number: [phone]  [facility] PCP or Specialty Provider: No          2.  In the last two weeks, has the patient had any new or worsening symptoms (not explained by alternative diagnosis)? Yes, the patient reports the following COVID-19 consistent symptoms: sore throat, muscle pain or body aches, fatigue and headache, since yesterday    3.  Does patient have any comoribidities? None     4.  Has the patient traveled in the last 14 days OR had any known contact with someone who is suspected or confirmed to have COVID-19?  No.    5. Disposition: Advised to go to     Note routed to [facility] Provider: SHANNON only.   
for ease with squatting at work and for care of home with lifting leg up onto stairs    4) Patient to demonstrate balance activity with no assist for safety with all work activity    5) Patient to be fully educated on all stretches for right leg/hip and report 50-75% decrease in tenderness and tightness for improved mobility and use of stairs    Long Term Goals: 8 weeks  1) Patient to be independent with home program to perform all daily and work activity with minimal to no pain or difficulty        Patient Education:   [x]  HEP/Education Completed: Continue with HEP. Continue stretches and use tennis ball for buttock.   [employer] Access Code:  []  No new Education completed  [x]  Reviewed Prior HEP      [x]  Patient verbalized and/or demonstrated understanding of education provided.  []  Patient unable to verbalize and/or demonstrate understanding of education provided.  Will continue education.  []  Barriers to learning:     PLAN:  []  Plan of care initiated.  Plan to see patient 2 times per week for 8 weeks to address the treatment planned outlined above.  [x]  Continue with current plan of care  []  Modify plan of care as follows:  [place] waiting on auth from insurance. Asking for 2x/week for 4 weeks.  []  Hold pending physician visit  []  Discharge    Time In 0915   Time Out 1000   Timed Code Minutes: 45 min   Total Treatment Time: 45 min       Electronically Signed by: Rossy Lehman PTA

## 2025-04-10 NOTE — PROGRESS NOTES
* PLEASE SIGN, DATE AND TIME CERTIFICATION BELOW AND RETURN TO [facility] OUTPATIENT REHABILITATION (FAX #: [phone]).  ATTEST/CO-SIGN IF ACCESSING VIA INGCWET.  THANK YOU.**    I certify that I have examined the patient below and determined that Physical Medicine and Rehabilitation service is necessary and that I approve the established plan of care for up to 90 days or as specifically noted.  Attestation, signature or co-signature of physician indicates approval of certification requirements.    ________________________ ____________  Physician Signature   Date     [facility]  OCCUPATIONAL THERAPY  [] EVALUATION  [] DAILY NOTE (LAND) [] DAILY NOTE (AQUATIC ) [x] PROGRESS NOTE [] DISCHARGE NOTE    [] OUTPATIENT REHABILITATION CENTER [facility]   [] [facility] CARE [place]    [x] [facility]   [] [facility]    Date: 4/10/2025  Patient Name:  Rosa Isela Short  : 1950  MRN: [id number]  CSN: [id number]    Referring Practitioner Lucy Delgadillo, APRN - CNP [id number]      Diagnosis  Diagnoses       M25.512 (ICD-10-CM) - Pain in left shoulder    Z96.612 (ICD-10-CM) - Presence of left artificial shoulder joint           Treatment Diagnosis M25.512  Left Shoulder Pain  M79.602  Left Arm Pain  M25.612 Stiffness of Left Shoulder   Date of Evaluation 25   Additional Pertinent History Rosa Isela Short has a past medical history of Anxiety, Blood transfusion, Diverticulosis of colon, Hyperlipidemia, Hypertension, Kidney stones, Localized, primary osteoarthritis of shoulder region, Obstruction of ascending colon (HCC), and T2DM (type 2 diabetes mellitus) ([facility]).  she has a past surgical history that includes joint replacement (Right, 2009); Cholecystectomy; Foot surgery (Bilateral, ); shoulder surgery (Left, ); Lithotripsy; Colonoscopy; Endoscopy, colon, diagnostic; Meckel's diverticulum excision; Carpal tunnel release (Right); Hysterectomy ();

## 2025-04-11 DIAGNOSIS — E78.00 PURE HYPERCHOLESTEROLEMIA: ICD-10-CM

## 2025-04-11 RX ORDER — ATORVASTATIN CALCIUM 20 MG/1
20 TABLET, FILM COATED ORAL DAILY
Qty: 90 TABLET | Refills: 3 | Status: SHIPPED | OUTPATIENT
Start: 2025-04-11

## 2025-04-11 RX ORDER — LISINOPRIL 2.5 MG/1
2.5 TABLET ORAL DAILY
Qty: 90 TABLET | Refills: 3 | Status: SHIPPED | OUTPATIENT
Start: 2025-04-11

## 2025-04-11 NOTE — TELEPHONE ENCOUNTER
Last visit- 2/26/2025  Next visit- 4/29/2025    Requested Prescriptions     Pending Prescriptions Disp Refills    atorvastatin (LIPITOR) 20 MG tablet 90 tablet 3     Sig: Take 1 tablet by mouth daily

## 2025-04-14 RX ORDER — METFORMIN HYDROCHLORIDE 500 MG/1
1000 TABLET, EXTENDED RELEASE ORAL 2 TIMES DAILY
Qty: 360 TABLET | Refills: 1 | Status: SHIPPED | OUTPATIENT
Start: 2025-04-14

## 2025-04-16 ENCOUNTER — APPOINTMENT (OUTPATIENT)
Dept: OCCUPATIONAL THERAPY | Age: 75
End: 2025-04-16
Payer: MEDICARE

## 2025-04-16 ENCOUNTER — HOSPITAL ENCOUNTER (OUTPATIENT)
Dept: PHYSICAL THERAPY | Age: 75
Setting detail: THERAPIES SERIES
Discharge: HOME | End: 2025-04-16
Payer: MEDICARE

## 2025-04-16 PROCEDURE — 97110 THERAPEUTIC EXERCISES: CPT

## 2025-04-16 PROCEDURE — 97140 MANUAL THERAPY 1/> REGIONS: CPT

## 2025-04-16 NOTE — PROGRESS NOTES
[facility]  PHYSICAL THERAPY  [] EVALUATION  [x] DAILY NOTE (LAND) [] DAILY NOTE (AQUATIC ) [] PROGRESS NOTE [] DISCHARGE NOTE    [] OUTPATIENT REHABILITATION CENTER [facility]   [] [place] [facility]    [x] [facility]   [] [facility]    Date: 2025  Patient Name:  Rosa Isela Short  : 1950  MRN: [id number]  CSN: [id number]    Referring Practitioner Mandy Barnard MD [id number]      Diagnosis  Diagnoses       M06.4 (ICD-10-CM) - Inflammatory polyarthropathy (HCC)    M15.0 (ICD-10-CM) - Primary generalized (osteo)arthritis           Treatment Diagnosis M25.551  Right Hip Pain  R53.1 Weakness  R26.2 Difficulty in walking   Date of Evaluation 3/14/25   Additional Pertinent History Rosa Isela Short has a past medical history of Anxiety, Blood transfusion, Diverticulosis of colon, Hyperlipidemia, Hypertension, Kidney stones, Localized, primary osteoarthritis of shoulder region, Obstruction of ascending colon (HCC), and T2DM (type 2 diabetes mellitus) ([facility]).  she has a past surgical history that includes joint replacement (Right, 2009); Cholecystectomy; Foot surgery (Bilateral, ); shoulder surgery (Left, ); Lithotripsy; Colonoscopy; Endoscopy, colon, diagnostic; Meckel's diverticulum excision; Carpal tunnel release (Right); Hysterectomy (); hemicolectomy (N/A, 2022); and Bowel resection (2022).     Allergies Allergies   Allergen Reactions    Adhesive Tape       Medications   Current Outpatient Medications:     metFORMIN (GLUCOPHAGE-XR) 500 MG extended release tablet, Take 2 tablets by mouth twice daily, Disp: 360 tablet, Rfl: 1    atorvastatin (LIPITOR) 20 MG tablet, Take 1 tablet by mouth daily, Disp: 90 tablet, Rfl: 3    lisinopril (PRINIVIL;ZESTRIL) 2.5 MG tablet, Take 1 tablet by mouth daily, Disp: 90 tablet, Rfl: 3    OZEMPIC, 1 MG/DOSE, 4 MG/3ML SOPN sc injection, INJECT 1 MG SUBCUTANEOUSLY ONCE A WEEK, Disp: 3 mL,

## 2025-04-17 ENCOUNTER — HOSPITAL ENCOUNTER (OUTPATIENT)
Dept: OCCUPATIONAL THERAPY | Age: 75
Setting detail: THERAPIES SERIES
Discharge: HOME | End: 2025-04-17
Payer: MEDICARE

## 2025-04-17 PROCEDURE — 97110 THERAPEUTIC EXERCISES: CPT

## 2025-04-17 NOTE — PROGRESS NOTES
[facility]  OCCUPATIONAL THERAPY  [] EVALUATION  [x] DAILY NOTE (LAND) [] DAILY NOTE (AQUATIC ) [] PROGRESS NOTE [] DISCHARGE NOTE    [] OUTPATIENT REHABILITATION CENTER [facility]   [] [place] [facility]    [x] [facility]   [] [facility]    Date: 2025  Patient Name:  Rosa Isela Short  : 1950  MRN: [id number]  CSN: [id number]    Referring Practitioner Lucy Delgadillo, APRN - CNP [id number]      Diagnosis  Diagnoses       M25.512 (ICD-10-CM) - Pain in left shoulder    Z96.612 (ICD-10-CM) - Presence of left artificial shoulder joint           Treatment Diagnosis M25.512  Left Shoulder Pain  M79.602  Left Arm Pain  M25.612 Stiffness of Left Shoulder   Date of Evaluation 25   Additional Pertinent History Rosa Isela Short has a past medical history of Anxiety, Blood transfusion, Diverticulosis of colon, Hyperlipidemia, Hypertension, Kidney stones, Localized, primary osteoarthritis of shoulder region, Obstruction of ascending colon (HCC), and T2DM (type 2 diabetes mellitus) ([facility]).  she has a past surgical history that includes joint replacement (Right, 2009); Cholecystectomy; Foot surgery (Bilateral, ); shoulder surgery (Left, ); Lithotripsy; Colonoscopy; Endoscopy, colon, diagnostic; Meckel's diverticulum excision; Carpal tunnel release (Right); Hysterectomy (); hemicolectomy (N/A, 2022); and Bowel resection (2022).     Allergies Allergies   Allergen Reactions    Adhesive Tape       Medications   Current Outpatient Medications:     metFORMIN (GLUCOPHAGE-XR) 500 MG extended release tablet, Take 2 tablets by mouth twice daily, Disp: 360 tablet, Rfl: 1    atorvastatin (LIPITOR) 20 MG tablet, Take 1 tablet by mouth daily, Disp: 90 tablet, Rfl: 3    lisinopril (PRINIVIL;ZESTRIL) 2.5 MG tablet, Take 1 tablet by mouth daily, Disp: 90 tablet, Rfl: 3    OZEMPIC, 1 MG/DOSE, 4 MG/3ML SOPN sc injection, INJECT 1 MG

## 2025-04-25 ENCOUNTER — HOSPITAL ENCOUNTER (OUTPATIENT)
Dept: PHYSICAL THERAPY | Age: 75
Setting detail: THERAPIES SERIES
Discharge: HOME | End: 2025-04-25
Payer: MEDICARE

## 2025-04-25 ENCOUNTER — HOSPITAL ENCOUNTER (OUTPATIENT)
Dept: OCCUPATIONAL THERAPY | Age: 75
Setting detail: THERAPIES SERIES
Discharge: HOME | End: 2025-04-25
Payer: MEDICARE

## 2025-04-25 PROCEDURE — 97110 THERAPEUTIC EXERCISES: CPT

## 2025-04-25 PROCEDURE — 97112 NEUROMUSCULAR REEDUCATION: CPT

## 2025-04-25 NOTE — PROGRESS NOTES
[facility]  PHYSICAL THERAPY  [] EVALUATION  [x] DAILY NOTE (LAND) [] DAILY NOTE (AQUATIC ) [] PROGRESS NOTE [] DISCHARGE NOTE    [] OUTPATIENT REHABILITATION CENTER [facility]   [] [place] [facility]    [x] [facility]   [] [facility]    Date: 2025  Patient Name:  Rosa Isela Short  : 1950  MRN: [id number]  CSN: [id number]    Referring Practitioner Mandy Barnard MD [id number]      Diagnosis  Diagnoses       M06.4 (ICD-10-CM) - Inflammatory polyarthropathy (HCC)    M15.0 (ICD-10-CM) - Primary generalized (osteo)arthritis           Treatment Diagnosis M25.551  Right Hip Pain  R53.1 Weakness  R26.2 Difficulty in walking   Date of Evaluation 3/14/25   Additional Pertinent History Rosa Isela Short has a past medical history of Anxiety, Blood transfusion, Diverticulosis of colon, Hyperlipidemia, Hypertension, Kidney stones, Localized, primary osteoarthritis of shoulder region, Obstruction of ascending colon (HCC), and T2DM (type 2 diabetes mellitus) ([facility]  she has a past surgical history that includes joint replacement (Right, 2009); Cholecystectomy; Foot surgery (Bilateral, ); shoulder surgery (Left, ); Lithotripsy; Colonoscopy; Endoscopy, colon, diagnostic; Meckel's diverticulum excision; Carpal tunnel release (Right); Hysterectomy (); hemicolectomy (N/A, 2022); and Bowel resection (2022).     Allergies Allergies   Allergen Reactions    Adhesive Tape       Medications   Current Outpatient Medications:     metFORMIN (GLUCOPHAGE-XR) 500 MG extended release tablet, Take 2 tablets by mouth twice daily, Disp: 360 tablet, Rfl: 1    atorvastatin (LIPITOR) 20 MG tablet, Take 1 tablet by mouth daily, Disp: 90 tablet, Rfl: 3    lisinopril (PRINIVIL;ZESTRIL) 2.5 MG tablet, Take 1 tablet by mouth daily, Disp: 90 tablet, Rfl: 3    OZEMPIC, 1 MG/DOSE, 4 MG/3ML SOPN sc injection, INJECT 1 MG SUBCUTANEOUSLY ONCE A WEEK, Disp: 3 mL,

## 2025-04-25 NOTE — PROGRESS NOTES
[facility]  OCCUPATIONAL THERAPY  [] EVALUATION  [x] DAILY NOTE (LAND) [] DAILY NOTE (AQUATIC ) [] PROGRESS NOTE [] DISCHARGE NOTE    [] OUTPATIENT REHABILITATION CENTER [facility]   [] [place] [facility]ULATORY [facility]ARE CENTER    [x] [facility]   [] [facility]    Date: 2025  Patient Name:  Rosa Isela Short  : 1950  MRN: [id number]  CSN: [id number]    Referring Practitioner Lucy Delgadillo, APRN - CNP [id number]      Diagnosis  Diagnoses       M25.512 (ICD-10-CM) - Pain in left shoulder    Z96.612 (ICD-10-CM) - Presence of left artificial shoulder joint           Treatment Diagnosis M25.512  Left Shoulder Pain  M79.602  Left Arm Pain  M25.612 Stiffness of Left Shoulder   Date of Evaluation 25   Additional Pertinent History Rosa Isela Short has a past medical history of Anxiety, Blood transfusion, Diverticulosis of colon, Hyperlipidemia, Hypertension, Kidney stones, Localized, primary osteoarthritis of shoulder region, Obstruction of ascending colon (HCC), and T2DM (type 2 diabetes mellitus) ([facility]).  she has a past surgical history that includes joint replacement (Right, 2009); Cholecystectomy; Foot surgery (Bilateral, ); shoulder surgery (Left, ); Lithotripsy; Colonoscopy; Endoscopy, colon, diagnostic; Meckel's diverticulum excision; Carpal tunnel release (Right); Hysterectomy (); hemicolectomy (N/A, 2022); and Bowel resection (2022).     Allergies Allergies   Allergen Reactions    Adhesive Tape       Medications   Current Outpatient Medications:     metFORMIN (GLUCOPHAGE-XR) 500 MG extended release tablet, Take 2 tablets by mouth twice daily, Disp: 360 tablet, Rfl: 1    atorvastatin (LIPITOR) 20 MG tablet, Take 1 tablet by mouth daily, Disp: 90 tablet, Rfl: 3    lisinopril (PRINIVIL;ZESTRIL) 2.5 MG tablet, Take 1 tablet by mouth daily, Disp: 90 tablet, Rfl: 3    OZEMPIC, 1 MG/DOSE, 4 MG/3ML SOPN sc injection, INJECT 1 MG

## 2025-04-27 DIAGNOSIS — E03.9 ACQUIRED HYPOTHYROIDISM: ICD-10-CM

## 2025-04-28 RX ORDER — LEVOTHYROXINE SODIUM 25 UG/1
TABLET ORAL
Qty: 135 TABLET | Refills: 0 | Status: SHIPPED | OUTPATIENT
Start: 2025-04-28

## 2025-04-28 NOTE — TELEPHONE ENCOUNTER
Last visit- 2/26/2025  Next visit- 4/29/2025    Requested Prescriptions     Pending Prescriptions Disp Refills    levothyroxine (SYNTHROID) 25 MCG tablet [Pharmacy Med Name: Levothyroxine Sodium 25 MCG Oral Tablet] 135 tablet 0     Sig: TAKE TWO TABLETS BY MOUTH ON MONDAY, WEDNESDAY AND FRIDAY, AND ONE TABLET ALL OTHER DAYS

## 2025-04-28 NOTE — PROGRESS NOTES
[facility] Medicine  [address]  [place] [address]  Phone:  [phone]     Medicare Annual Wellness Visit    Rosa Isela Short is here for Medicare AWV    Assessment & Plan   Initial Medicare annual wellness visit        -     Routine healthcare maintenance was reviewed as below.    Type 2 diabetes mellitus without complication, without long-term current use of insulin (HCC)  -     Will check routine labs.  Her appetite is down so will hold Ozempic to see if it improves.  A healthy diet and routine physical activity encouraged.  -     POCT glycosylated hemoglobin (Hb A1C)  -     Lipid Panel; Future  -     Albumin/Creatinine Ratio, Urine; Future    Acquired hypothyroidism  -     Will check TSH and adjust Levothyroxine dose if needed.  -     TSH; Future       Return in about 6 months (around 10/29/2025) for diabetes.       Aisha Pérez has T2DM and her last HbA1c was 5.7% in the fall.    She is following with Dr. Barnard and she referred her to PT for her hip after a fall in February.      She had left carpal tunnel release last week and it's healing well.    Lab Results   Component Value Date     12/31/2024    K 4.0 12/31/2024     12/31/2024    CO2 25 12/31/2024    BUN 16 12/31/2024    CREATININE 0.6 02/24/2025    GLUCOSE 113 12/31/2024    CALCIUM 10.0 12/31/2024    BILITOT 0.7 12/31/2024    ALKPHOS 57 12/31/2024    AST 25 12/31/2024    ALT 14 02/24/2025    LABGLOM > 90 02/24/2025     Lab Results   Component Value Date    CHOL 129 09/25/2023    TRIG 131 09/25/2023    HDL 48 09/25/2023     Lab Results   Component Value Date    ALT 14 02/24/2025    AST 25 12/31/2024        Hypothyroidism: Recent symptoms: none. She denies weight gain and weight loss. Patient is  taking her medication consistently on an empty stomach.    No components found for: \"TSHREFLEX\"  Lab Results   Component Value Date    TSH 4.020 04/02/2024    TSH 4.750 (H) 10/26/2023    TSH 8.520 (H) 09/25/2023

## 2025-04-29 ENCOUNTER — OFFICE VISIT (OUTPATIENT)
Dept: FAMILY MEDICINE CLINIC | Age: 75
End: 2025-04-29
Payer: MEDICARE

## 2025-04-29 VITALS
HEART RATE: 82 BPM | SYSTOLIC BLOOD PRESSURE: 118 MMHG | TEMPERATURE: 98 F | DIASTOLIC BLOOD PRESSURE: 78 MMHG | BODY MASS INDEX: 21.65 KG/M2 | WEIGHT: 107.4 LBS | HEIGHT: 59 IN

## 2025-04-29 DIAGNOSIS — Z00.00 INITIAL MEDICARE ANNUAL WELLNESS VISIT: Primary | ICD-10-CM

## 2025-04-29 DIAGNOSIS — E03.9 ACQUIRED HYPOTHYROIDISM: ICD-10-CM

## 2025-04-29 DIAGNOSIS — E11.9 TYPE 2 DIABETES MELLITUS WITHOUT COMPLICATION, WITHOUT LONG-TERM CURRENT USE OF INSULIN (HCC): ICD-10-CM

## 2025-04-29 DIAGNOSIS — Z00.00 ENCOUNTER FOR MEDICARE ANNUAL WELLNESS EXAM: ICD-10-CM

## 2025-04-29 PROCEDURE — 3074F SYST BP LT 130 MM HG: CPT | Performed by: FAMILY MEDICINE

## 2025-04-29 PROCEDURE — 99213 OFFICE O/P EST LOW 20 MIN: CPT | Performed by: FAMILY MEDICINE

## 2025-04-29 PROCEDURE — G0438 PPPS, INITIAL VISIT: HCPCS | Performed by: FAMILY MEDICINE

## 2025-04-29 PROCEDURE — 1159F MED LIST DOCD IN RCRD: CPT | Performed by: FAMILY MEDICINE

## 2025-04-29 PROCEDURE — 1123F ACP DISCUSS/DSCN MKR DOCD: CPT | Performed by: FAMILY MEDICINE

## 2025-04-29 PROCEDURE — 3078F DIAST BP <80 MM HG: CPT | Performed by: FAMILY MEDICINE

## 2025-04-29 ASSESSMENT — LIFESTYLE VARIABLES
HOW MANY STANDARD DRINKS CONTAINING ALCOHOL DO YOU HAVE ON A TYPICAL DAY: PATIENT DOES NOT DRINK
HOW OFTEN DO YOU HAVE A DRINK CONTAINING ALCOHOL: NEVER

## 2025-04-29 ASSESSMENT — PATIENT HEALTH QUESTIONNAIRE - PHQ9
SUM OF ALL RESPONSES TO PHQ QUESTIONS 1-9: 0
SUM OF ALL RESPONSES TO PHQ QUESTIONS 1-9: 0
1. LITTLE INTEREST OR PLEASURE IN DOING THINGS: NOT AT ALL
2. FEELING DOWN, DEPRESSED OR HOPELESS: NOT AT ALL

## 2025-04-29 NOTE — PATIENT INSTRUCTIONS
you die?  When should you call for help?  Be sure to contact your doctor if you have any questions.  Where can you learn more?  Go to https://[email or web address].net/patientEd and enter R264 to learn more about \"Advance Directives: Care Instructions.\"  Current as of: March 1, 2024  Content Version: 14.4  © 2384-6558 [employer].   Care instructions adapted under license by [employer]. If you have questions about a medical condition or this instruction, always ask your healthcare professional. [employer] disclaims any warranty or liability for your use of this information.         A Healthy Heart: Care Instructions  Overview     Coronary artery disease, also called heart disease, occurs when a substance called plaque builds up in the vessels that supply oxygen-rich blood to your heart muscle. This can narrow the blood vessels and reduce blood flow. A heart attack happens when blood flow is completely blocked. A high-fat diet, smoking, and other factors increase the risk of heart disease.  Your doctor has found that you have a chance of having heart disease. A heart-healthy lifestyle can help keep your heart healthy and prevent heart disease. This lifestyle includes eating healthy, being active, staying at a weight that's healthy for you, and not smoking or using tobacco. It also includes taking medicines as directed, managing other health conditions, and trying to get a healthy amount of sleep.  Follow-up care is a key part of your treatment and safety. Be sure to make and go to all appointments, and call your doctor if you are having problems. It's also a good idea to know your test results and keep a list of the medicines you take.  How can you care for yourself at home?  Diet    Use less salt when you cook and eat. This helps lower your blood pressure. Taste food before salting. Add only a little salt when you think you need it. With time, your taste buds will adjust to less salt.     Eat

## 2025-04-30 ENCOUNTER — HOSPITAL ENCOUNTER (OUTPATIENT)
Dept: PHYSICAL THERAPY | Age: 75
Setting detail: THERAPIES SERIES
Discharge: HOME | End: 2025-04-30
Payer: MEDICARE

## 2025-04-30 ENCOUNTER — HOSPITAL ENCOUNTER (OUTPATIENT)
Dept: OCCUPATIONAL THERAPY | Age: 75
Setting detail: THERAPIES SERIES
Discharge: HOME | End: 2025-04-30
Payer: MEDICARE

## 2025-04-30 PROCEDURE — 97112 NEUROMUSCULAR REEDUCATION: CPT

## 2025-04-30 PROCEDURE — 97110 THERAPEUTIC EXERCISES: CPT

## 2025-04-30 NOTE — PROGRESS NOTES
on HEP with strengthening to help stabilize the hip and see if takes care of remaining soreness/pain. Patient reported feeling good and no increase in pain after session.    GOALS:  Patient Goal: To not have her hip hurt to be able to go up and down stairs    Short Term Goals: 4 weeks  1) Patient to report 25-50% decrease in right hip pain for ease with sleeping on right side at night    2) Patient to use consistent reciprocal pattern without compensation for ease with moving between floors at home    3) Patient to demonstrate 5/5 strength in right hip flexion without pain for ease with squatting at work and for care of home with lifting leg up onto stairs    4) Patient to demonstrate balance activity with no assist for safety with all work activity    5) Patient to be fully educated on all stretches for right leg/hip and report 50-75% decrease in tenderness and tightness for improved mobility and use of stairs    Long Term Goals: 8 weeks  1) Patient to be independent with home program to perform all daily and work activity with minimal to no pain or difficulty        Patient Education:   [x]  HEP/Education Completed: Continue with HEP. Let know what needs for HEP.  [email or web address] Access Code:  []  No new Education completed  [x]  Reviewed Prior HEP      [x]  Patient verbalized and/or demonstrated understanding of education provided.  []  Patient unable to verbalize and/or demonstrate understanding of education provided.  Will continue education.  []  Barriers to learning:     PLAN:  []  Plan of care initiated.  Plan to see patient 2 times per week for 8 weeks to address the treatment planned outlined above.  [x]  Continue with current plan of care  []  Modify plan of care as follows:  [place] waiting on auth from insurance. Asking for 2x/week for 4 weeks.  []  Hold pending physician visit  []  Discharge    Time In 1445   Time Out 1530   Timed Code Minutes: 45 min   Total Treatment Time: 45 min       Electronically Signed by:

## 2025-04-30 NOTE — PROGRESS NOTES
[facility]  OCCUPATIONAL THERAPY  [] EVALUATION  [x] DAILY NOTE (LAND) [] DAILY NOTE (AQUATIC ) [] PROGRESS NOTE [] DISCHARGE NOTE    [] OUTPATIENT REHABILITATION [facility]   [] [place] [facility]ARE [facility]ENTER    [x] [facility]   [] [facility]    Date: 2025  Patient Name:  Rosa Isela Short  : 1950  MRN: [id number]  CSN: [id number]    Referring Practitioner Lucy Delgadillo, APRN - CNP [id number]      Diagnosis  Diagnoses       M25.512 (ICD-10-CM) - Pain in left shoulder    Z96.612 (ICD-10-CM) - Presence of left artificial shoulder joint           Treatment Diagnosis M25.512  Left Shoulder Pain  M79.602  Left Arm Pain  M25.612 Stiffness of Left Shoulder   Date of Evaluation 25   Additional Pertinent History Rosa Isela Short has a past medical history of Anxiety, Blood transfusion, Diverticulosis of colon, Fibromyalgia, GERD (gastroesophageal reflux disease), Headache, Hyperlipidemia, Hypertension, Hypothyroidism, Irritable bowel syndrome, Kidney stones, Localized, primary osteoarthritis of shoulder region, Obstruction of ascending colon (HCC), T2DM (type 2 diabetes mellitus) (HCC), and Urinary incontinence.  she has a past surgical history that includes joint replacement (Right, 2009); Cholecystectomy; Foot surgery (Bilateral, ); shoulder surgery (Left, ); Lithotripsy; Colonoscopy; Endoscopy, colon, diagnostic; Meckel's diverticulum excision; Carpal tunnel release (Right); Hysterectomy (); hemicolectomy (N/A, 2022); Bowel resection (2022); Upper gastrointestinal endoscopy; and Carpal tunnel release (Left).     Allergies Allergies   Allergen Reactions    Adhesive Tape       Medications   Current Outpatient Medications:     levothyroxine (SYNTHROID) 25 MCG tablet, TAKE TWO TABLETS BY MOUTH ON MONDAY, WEDNESDAY AND FRIDAY, AND ONE TABLET ALL OTHER DAYS, Disp: 135 tablet, Rfl: 0    metFORMIN (GLUCOPHAGE-XR) 500 MG

## 2025-05-02 ENCOUNTER — HOSPITAL ENCOUNTER (OUTPATIENT)
Dept: OCCUPATIONAL THERAPY | Age: 75
Setting detail: THERAPIES SERIES
Discharge: HOME | End: 2025-05-02
Payer: MEDICARE

## 2025-05-02 ENCOUNTER — HOSPITAL ENCOUNTER (OUTPATIENT)
Dept: PHYSICAL THERAPY | Age: 75
Setting detail: THERAPIES SERIES
Discharge: HOME | End: 2025-05-02
Payer: MEDICARE

## 2025-05-02 PROCEDURE — 97110 THERAPEUTIC EXERCISES: CPT

## 2025-05-02 NOTE — DISCHARGE SUMMARY
[facility]  PHYSICAL THERAPY  [] EVALUATION  [] DAILY NOTE (LAND) [] DAILY NOTE (AQUATIC ) [] PROGRESS NOTE [x] DISCHARGE NOTE    [] OUTPATIENT REHABILITATION CENTER [facility]   [] [place] [facility]    [x] [facility]   [] [facility]    Date: 2025  Patient Name:  Rosa Isela Short  : 1950  MRN: [id number]  CSN: [id number]    Referring Practitioner Mandy Barnard MD [id number]      Diagnosis  Diagnoses       M06.4 (ICD-10-CM) - Inflammatory polyarthropathy (HCC)    M15.0 (ICD-10-CM) - Primary generalized (osteo)arthritis           Treatment Diagnosis M25.551  Right Hip Pain  R53.1 Weakness  R26.2 Difficulty in walking   Date of Evaluation 3/14/25   Additional Pertinent History Rosa Isela Short has a past medical history of Anxiety, Blood transfusion, Diverticulosis of colon, Fibromyalgia, GERD (gastroesophageal reflux disease), Headache, Hyperlipidemia, Hypertension, Hypothyroidism, Irritable bowel syndrome, Kidney stones, Localized, primary osteoarthritis of shoulder region, Obstruction of ascending colon (HCC), T2DM (type 2 diabetes mellitus) (HCC), and Urinary incontinence.  she has a past surgical history that includes joint replacement (Right, 2009); Cholecystectomy; Foot surgery (Bilateral, ); shoulder surgery (Left, ); Lithotripsy; Colonoscopy; Endoscopy, colon, diagnostic; Meckel's diverticulum excision; Carpal tunnel release (Right); Hysterectomy (); hemicolectomy (N/A, 2022); Bowel resection (2022); Upper gastrointestinal endoscopy; and Carpal tunnel release (Left).     Allergies Allergies   Allergen Reactions    Adhesive Tape       Medications   Current Outpatient Medications:     levothyroxine (SYNTHROID) 25 MCG tablet, TAKE TWO TABLETS BY MOUTH ON MONDAY, WEDNESDAY AND FRIDAY, AND ONE TABLET ALL OTHER DAYS, Disp: 135 tablet, Rfl: 0    metFORMIN (GLUCOPHAGE-XR) 500 MG extended release tablet, Take 2

## 2025-05-02 NOTE — PROGRESS NOTES
[facility]  OCCUPATIONAL THERAPY  [] EVALUATION  [x] DAILY NOTE (LAND) [] DAILY NOTE (AQUATIC ) [] PROGRESS NOTE [] DISCHARGE NOTE    [] OUTPATIENT REHABILITATION [facility]   [] [facility]    [x] [facility]   [] [facility]    Date: [facility]25  Patient Name:  Rosa Isela Short  : 1950  MRN: [id number]  CSN: [id number]    Referring Practitioner Lucy Delgadillo, APRN - CNP [id number]      Diagnosis  Diagnoses       M25.512 (ICD-10-CM) - Pain in left shoulder    Z96.612 (ICD-10-CM) - Presence of left artificial shoulder joint           Treatment Diagnosis M25.512  Left Shoulder Pain  M79.602  Left Arm Pain  M25.612 Stiffness of Left Shoulder   Date of Evaluation 25   Additional Pertinent History Rosa Isela Short has a past medical history of Anxiety, Blood transfusion, Diverticulosis of colon, Fibromyalgia, GERD (gastroesophageal reflux disease), Headache, Hyperlipidemia, Hypertension, Hypothyroidism, Irritable bowel syndrome, Kidney stones, Localized, primary osteoarthritis of shoulder region, Obstruction of ascending colon (HCC), T2DM (type 2 diabetes mellitus) (HCC), and Urinary incontinence.  she has a past surgical history that includes joint replacement (Right, 2009); Cholecystectomy; Foot surgery (Bilateral, ); shoulder surgery (Left, ); Lithotripsy; Colonoscopy; Endoscopy, colon, diagnostic; Meckel's diverticulum excision; Carpal tunnel release (Right); Hysterectomy (); hemicolectomy (N/A, 2022); Bowel resection (2022); Upper gastrointestinal endoscopy; and Carpal tunnel release (Left).     Allergies Allergies   Allergen Reactions    Adhesive Tape       Medications   Current Outpatient Medications:     levothyroxine (SYNTHROID) 25 MCG tablet, TAKE TWO TABLETS BY MOUTH ON MONDAY, WEDNESDAY AND FRIDAY, AND ONE TABLET ALL OTHER DAYS, Disp: 135 tablet, Rfl: 0    metFORMIN (GLUCOPHAGE-XR) 500 MG extended

## 2025-05-07 ENCOUNTER — HOSPITAL ENCOUNTER (OUTPATIENT)
Dept: OCCUPATIONAL THERAPY | Age: 75
Setting detail: THERAPIES SERIES
Discharge: HOME | End: 2025-05-07
Payer: MEDICARE

## 2025-05-07 PROCEDURE — 97110 THERAPEUTIC EXERCISES: CPT

## 2025-05-07 NOTE — PROGRESS NOTES
[facility]  OCCUPATIONAL THERAPY  [] EVALUATION  [x] DAILY NOTE (LAND) [] DAILY NOTE (AQUATIC ) [] PROGRESS NOTE [] DISCHARGE NOTE    [] OUTPATIENT REHABILITATION CENTER [facility]   [] [place] [facility]    [x] [facility]   [] [facility]    Date: 2025  Patient Name:  Rosa Isela Short  : 1950  MRN: [id number]  CSN: [id number]    Referring Practitioner Lucy Delgadillo, APRN - CNP [id number]      Diagnosis  Diagnoses       M25.512 (ICD-10-CM) - Pain in left shoulder    Z96.612 (ICD-10-CM) - Presence of left artificial shoulder joint           Treatment Diagnosis M25.512  Left Shoulder Pain  M79.602  Left Arm Pain  M25.612 Stiffness of Left Shoulder   Date of Evaluation 25   Additional Pertinent History Rosa Isela Short has a past medical history of Anxiety, Blood transfusion, Diverticulosis of colon, Fibromyalgia, GERD (gastroesophageal reflux disease), Headache, Hyperlipidemia, Hypertension, Hypothyroidism, Irritable bowel syndrome, Kidney stones, Localized, primary osteoarthritis of shoulder region, Obstruction of ascending colon (HCC), T2DM (type 2 diabetes mellitus) ([facility]), and Urinary incontinence.  she has a past surgical history that includes joint replacement (Right, 2009); Cholecystectomy; Foot surgery (Bilateral, ); shoulder surgery (Left, ); Lithotripsy; Colonoscopy; Endoscopy, colon, diagnostic; Meckel's diverticulum excision; Carpal tunnel release (Right); Hysterectomy (); hemicolectomy (N/A, 2022); Bowel resection (2022); Upper gastrointestinal endoscopy; and Carpal tunnel release (Left).     Allergies Allergies   Allergen Reactions    Adhesive Tape       Medications   Current Outpatient Medications:     levothyroxine (SYNTHROID) 25 MCG tablet, TAKE TWO TABLETS BY MOUTH ON MONDAY, WEDNESDAY AND FRIDAY, AND ONE TABLET ALL OTHER DAYS, Disp: 135 tablet, Rfl: 0    metFORMIN (GLUCOPHAGE-XR) 500 MG extended

## 2025-05-09 ENCOUNTER — APPOINTMENT (OUTPATIENT)
Dept: OCCUPATIONAL THERAPY | Age: 75
End: 2025-05-09
Payer: MEDICARE

## 2025-05-15 ENCOUNTER — APPOINTMENT (OUTPATIENT)
Dept: OCCUPATIONAL THERAPY | Age: 75
End: 2025-05-15
Payer: MEDICARE

## 2025-05-16 ENCOUNTER — HOSPITAL ENCOUNTER (OUTPATIENT)
Dept: OCCUPATIONAL THERAPY | Age: 75
Setting detail: THERAPIES SERIES
Discharge: HOME | End: 2025-05-16
Payer: MEDICARE

## 2025-05-16 PROCEDURE — 97110 THERAPEUTIC EXERCISES: CPT

## 2025-05-28 ENCOUNTER — LAB (OUTPATIENT)
Dept: LAB | Age: 75
End: 2025-05-28

## 2025-05-28 ENCOUNTER — RESULTS FOLLOW-UP (OUTPATIENT)
Dept: FAMILY MEDICINE CLINIC | Age: 75
End: 2025-05-28

## 2025-05-28 DIAGNOSIS — E03.9 ACQUIRED HYPOTHYROIDISM: ICD-10-CM

## 2025-05-28 DIAGNOSIS — Z00.00 INITIAL MEDICARE ANNUAL WELLNESS VISIT: ICD-10-CM

## 2025-05-28 DIAGNOSIS — E11.9 TYPE 2 DIABETES MELLITUS WITHOUT COMPLICATION, WITHOUT LONG-TERM CURRENT USE OF INSULIN (HCC): ICD-10-CM

## 2025-05-28 LAB
ALBUMIN SERPL BCG-MCNC: 4.6 G/DL (ref 3.4–4.9)
ALP SERPL-CCNC: 81 U/L (ref 38–126)
ALT SERPL W/O P-5'-P-CCNC: 16 U/L (ref 10–35)
ANION GAP SERPL CALC-SCNC: 15 MEQ/L (ref 8–16)
AST SERPL-CCNC: 26 U/L (ref 10–35)
BASOPHILS ABSOLUTE: 0 THOU/MM3 (ref 0–0.1)
BASOPHILS NFR BLD AUTO: 0.2 %
BILIRUB SERPL-MCNC: 0.3 MG/DL (ref 0.3–1.2)
BUN SERPL-MCNC: 18 MG/DL (ref 8–23)
CALCIUM SERPL-MCNC: 10.8 MG/DL (ref 8.8–10.2)
CHLORIDE SERPL-SCNC: 100 MEQ/L (ref 98–111)
CHOLEST SERPL-MCNC: 104 MG/DL (ref 100–199)
CO2 SERPL-SCNC: 23 MEQ/L (ref 22–29)
CREAT SERPL-MCNC: 0.9 MG/DL (ref 0.5–0.9)
CREAT UR-MCNC: 151 MG/DL
DEPRECATED MEAN GLUCOSE BLD GHB EST-ACNC: 120 MG/DL (ref 70–126)
DEPRECATED RDW RBC AUTO: 46.7 FL (ref 35–45)
EOSINOPHIL NFR BLD AUTO: 1 %
EOSINOPHILS ABSOLUTE: 0.1 THOU/MM3 (ref 0–0.4)
ERYTHROCYTE [DISTWIDTH] IN BLOOD BY AUTOMATED COUNT: 13 % (ref 11.5–14.5)
ERYTHROCYTE [SEDIMENTATION RATE] IN BLOOD BY WESTERGREN METHOD: 15 MM/HR (ref 0–20)
GFR SERPL CREATININE-BSD FRML MDRD: 68 ML/MIN/1.73M2
GLUCOSE SERPL-MCNC: 130 MG/DL (ref 74–109)
HBA1C MFR BLD HPLC: 6 % (ref 4–6)
HCT VFR BLD AUTO: 38.5 % (ref 37–47)
HDLC SERPL-MCNC: 51 MG/DL
HGB BLD-MCNC: 12.4 GM/DL (ref 12–16)
IMM GRANULOCYTES # BLD AUTO: 0.02 THOU/MM3 (ref 0–0.07)
IMM GRANULOCYTES NFR BLD AUTO: 0.3 %
LDLC SERPL CALC-MCNC: 32 MG/DL
LYMPHOCYTES ABSOLUTE: 1.7 THOU/MM3 (ref 1–4.8)
LYMPHOCYTES NFR BLD AUTO: 26.8 %
MCH RBC QN AUTO: 31.3 PG (ref 26–33)
MCHC RBC AUTO-ENTMCNC: 32.2 GM/DL (ref 32.2–35.5)
MCV RBC AUTO: 97.2 FL (ref 81–99)
MICROALBUMIN UR-MCNC: 2.08 MG/DL
MICROALBUMIN/CREAT RATIO PNL UR: 14 MG/G (ref 0–30)
MONOCYTES ABSOLUTE: 0.3 THOU/MM3 (ref 0.4–1.3)
MONOCYTES NFR BLD AUTO: 5.4 %
NEUTROPHILS ABSOLUTE: 4.1 THOU/MM3 (ref 1.8–7.7)
NEUTROPHILS NFR BLD AUTO: 66.3 %
NRBC BLD AUTO-RTO: 0 /100 WBC
PLATELET # BLD AUTO: 270 THOU/MM3 (ref 130–400)
PMV BLD AUTO: 10.8 FL (ref 9.4–12.4)
POTASSIUM SERPL-SCNC: 4.7 MEQ/L (ref 3.5–5.2)
PROT SERPL-MCNC: 7.4 G/DL (ref 6.4–8.3)
RBC # BLD AUTO: 3.96 MILL/MM3 (ref 4.2–5.4)
SODIUM SERPL-SCNC: 138 MEQ/L (ref 135–145)
TRIGL SERPL-MCNC: 107 MG/DL (ref 0–199)
TSH SERPL DL<=0.05 MIU/L-ACNC: 7.13 UIU/ML (ref 0.27–4.2)
URATE SERPL-MCNC: 4.9 MG/DL (ref 2.4–5.7)
WBC # BLD AUTO: 6.2 THOU/MM3 (ref 4.8–10.8)

## 2025-06-19 RX ORDER — TOLTERODINE TARTRATE 1 MG/1
1 TABLET, EXTENDED RELEASE ORAL 2 TIMES DAILY
Qty: 180 TABLET | Refills: 1 | Status: SHIPPED | OUTPATIENT
Start: 2025-06-19

## 2025-07-15 RX ORDER — ONDANSETRON 4 MG/1
4 TABLET, FILM COATED ORAL EVERY 8 HOURS PRN
Qty: 30 TABLET | Refills: 2 | Status: SHIPPED | OUTPATIENT
Start: 2025-07-15

## 2025-07-31 ENCOUNTER — TELEPHONE (OUTPATIENT)
Dept: FAMILY MEDICINE CLINIC | Age: 75
End: 2025-07-31

## 2025-07-31 NOTE — TELEPHONE ENCOUNTER
Patient aware and voiced understanding, no concerns voiced at this time.     Pt has her MRI and EEG scheduled for 8/25.  She will keep us posted

## 2025-07-31 NOTE — TELEPHONE ENCOUNTER
It looks like she typically runs around 11.8 so I can see her after she gets her MRI and EEG done unless she has any acute issues beforehand.

## 2025-07-31 NOTE — TELEPHONE ENCOUNTER
Pt states she saw Dr Walker (Neurology) who is concerned with her Hgb 11.8 and HCT 35.1 and wants to know if you want to see the pt    Pt also getting Mri Brain and EEG scheduled per Dr Walker due to some face numbness    Labs entered

## 2025-08-28 ENCOUNTER — LAB (OUTPATIENT)
Dept: LAB | Age: 75
End: 2025-08-28

## 2025-08-28 LAB
ALT SERPL W/O P-5'-P-CCNC: 12 U/L (ref 10–35)
AST SERPL-CCNC: 23 U/L (ref 10–35)
BASOPHILS ABSOLUTE: 0 THOU/MM3 (ref 0–0.1)
BASOPHILS NFR BLD AUTO: 0.3 %
CALCIUM SERPL-MCNC: 10.5 MG/DL (ref 8.5–10.5)
CREAT SERPL-MCNC: 0.8 MG/DL (ref 0.5–0.9)
DEPRECATED RDW RBC AUTO: 48.8 FL (ref 35–45)
EOSINOPHIL NFR BLD AUTO: 1.4 %
EOSINOPHILS ABSOLUTE: 0.1 THOU/MM3 (ref 0–0.4)
ERYTHROCYTE [DISTWIDTH] IN BLOOD BY AUTOMATED COUNT: 13.3 % (ref 11.5–14.5)
ERYTHROCYTE [SEDIMENTATION RATE] IN BLOOD BY WESTERGREN METHOD: 17 MM/HR (ref 0–20)
GFR SERPL CREATININE-BSD FRML MDRD: 78 ML/MIN/1.73M2
HCT VFR BLD AUTO: 34.2 % (ref 37–47)
HGB BLD-MCNC: 11.5 GM/DL (ref 12–16)
IMM GRANULOCYTES # BLD AUTO: 0.02 THOU/MM3 (ref 0–0.07)
IMM GRANULOCYTES NFR BLD AUTO: 0.3 %
LYMPHOCYTES ABSOLUTE: 1.7 THOU/MM3 (ref 1–4.8)
LYMPHOCYTES NFR BLD AUTO: 22.3 %
MCH RBC QN AUTO: 33.3 PG (ref 26–33)
MCHC RBC AUTO-ENTMCNC: 33.6 GM/DL (ref 32.2–35.5)
MCV RBC AUTO: 99.1 FL (ref 81–99)
MONOCYTES ABSOLUTE: 0.4 THOU/MM3 (ref 0.4–1.3)
MONOCYTES NFR BLD AUTO: 5.1 %
NEUTROPHILS ABSOLUTE: 5.4 THOU/MM3 (ref 1.8–7.7)
NEUTROPHILS NFR BLD AUTO: 70.6 %
NRBC BLD AUTO-RTO: 0 /100 WBC
PLATELET # BLD AUTO: 295 THOU/MM3 (ref 130–400)
PMV BLD AUTO: 10.8 FL (ref 9.4–12.4)
PTH-INTACT SERPL-MCNC: 30 PG/ML (ref 15–65)
RBC # BLD AUTO: 3.45 MILL/MM3 (ref 4.2–5.4)
WBC # BLD AUTO: 7.7 THOU/MM3 (ref 4.8–10.8)

## 2025-09-04 ENCOUNTER — LAB (OUTPATIENT)
Dept: LAB | Age: 75
End: 2025-09-04

## 2025-09-04 LAB
HBV SURFACE AB TITR SER: 10.3 MIU/ML
HBV SURFACE AG SERPL QL IA: NONREACTIVE
HCV IGG SERPL QL IA: NONREACTIVE
URATE SERPL-MCNC: 4.3 MG/DL (ref 2.4–5.7)

## 2025-09-05 LAB — HBV CORE IGG+IGM SERPL QL IA: NONREACTIVE

## (undated) DEVICE — SUTURE VCRL + SZ 0 L18IN ABSRB TIE VCP106G

## (undated) DEVICE — SUTURE ABSORBABLE MONOFILAMENT 0 CTX 60 IN VIO PDS + PDP990G

## (undated) DEVICE — PENCIL SMK EVAC ALL IN 1 DSGN ENH VISIBILITY IMPROVED AIR

## (undated) DEVICE — SUTURE VCRL SZ 3-0 L18IN ABSRB VLT L26MM SH 1/2 CIR J774D

## (undated) DEVICE — SUTURE VCRL + SZ 3-0 L27IN ABSRB UD L26MM SH 1/2 CIR VCP416H

## (undated) DEVICE — RELOAD STPL L75MM OPN H3.8MM CLS 1.5MM WIRE DIA0.2MM REG

## (undated) DEVICE — 450 ML BOTTLE OF 0.05% CHLORHEXIDINE GLUCONATE IN 99.95% STERILE WATER FOR IRRIGATION, USP AND APPLICATOR.: Brand: IRRISEPT ANTIMICROBIAL WOUND LAVAGE

## (undated) DEVICE — YANKAUER,POOLE TIP,STERILE,50/CS: Brand: MEDLINE

## (undated) DEVICE — TTL1LYR 16FR10ML 100%SIL TMPST TR: Brand: MEDLINE

## (undated) DEVICE — SUTURE PROL SZ 2-0 L36IN NONABSORBABLE BLU SH L26MM 1/2 CIR 8523H

## (undated) DEVICE — GLOVE SURG SZ 7 L12IN FNGR THK94MIL TRNSLUC YEL LTX HYDRGEL

## (undated) DEVICE — STAPLER INT L60MM REG TISS BLU B FRM 8 FIRING 2 ROW AUTO

## (undated) DEVICE — PENCIL SMK EVAC 10 FT BLADE ELECTRD ROCKER

## (undated) DEVICE — ADHESIVE SKIN CLSR 0.7ML TOP DERMBND ADV

## (undated) DEVICE — SPONGE GZ W4XL4IN COT 12 PLY TYP VII WVN C FLD DSGN

## (undated) DEVICE — TOWEL,OR,DSP,ST,WHITE,DLX,XR,4/PK,20PK/C: Brand: MEDLINE

## (undated) DEVICE — SPONGE LAP W18XL18IN WHT COT 4 PLY FLD STRUNG RADPQ DISP ST

## (undated) DEVICE — SUTURE VCRL + SZ 2-0 L27IN ABSRB WHT SH 1/2 CIR TAPERCUT VCP417H

## (undated) DEVICE — STAPLER INT L75MM CUT LN L73MM STPL LN L77MM BLU B FRM 8

## (undated) DEVICE — ABDOMINAL BINDER: Brand: DEROYAL

## (undated) DEVICE — BREAST HERNIA PACK: Brand: MEDLINE INDUSTRIES, INC.